# Patient Record
Sex: FEMALE | Race: WHITE | NOT HISPANIC OR LATINO | URBAN - METROPOLITAN AREA
[De-identification: names, ages, dates, MRNs, and addresses within clinical notes are randomized per-mention and may not be internally consistent; named-entity substitution may affect disease eponyms.]

---

## 2017-12-02 ENCOUNTER — EMERGENCY (EMERGENCY)
Facility: HOSPITAL | Age: 52
LOS: 1 days | End: 2017-12-02
Payer: COMMERCIAL

## 2017-12-02 PROCEDURE — 99284 EMERGENCY DEPT VISIT MOD MDM: CPT

## 2018-02-20 ENCOUNTER — INPATIENT (INPATIENT)
Facility: HOSPITAL | Age: 53
LOS: 0 days | Discharge: LEFT BEFORE DISCHARGE | End: 2018-02-21
Attending: INTERNAL MEDICINE | Admitting: INTERNAL MEDICINE
Payer: MEDICAID

## 2018-02-20 VITALS — WEIGHT: 117.07 LBS | HEIGHT: 62 IN

## 2018-02-20 LAB
ALBUMIN SERPL ELPH-MCNC: 4.7 G/DL — SIGNIFICANT CHANGE UP (ref 3.3–5)
ALP SERPL-CCNC: 128 U/L — HIGH (ref 40–120)
ALT FLD-CCNC: 66 U/L — SIGNIFICANT CHANGE UP (ref 12–78)
ANION GAP SERPL CALC-SCNC: 12 MMOL/L — SIGNIFICANT CHANGE UP (ref 5–17)
APPEARANCE UR: CLEAR — SIGNIFICANT CHANGE UP
APTT BLD: 31 SEC — SIGNIFICANT CHANGE UP (ref 27.5–37.4)
AST SERPL-CCNC: 87 U/L — HIGH (ref 15–37)
BASOPHILS # BLD AUTO: 0.1 K/UL — SIGNIFICANT CHANGE UP (ref 0–0.2)
BASOPHILS NFR BLD AUTO: 2.5 % — HIGH (ref 0–2)
BILIRUB SERPL-MCNC: 0.7 MG/DL — SIGNIFICANT CHANGE UP (ref 0.2–1.2)
BILIRUB UR-MCNC: (no result)
BUN SERPL-MCNC: 13 MG/DL — SIGNIFICANT CHANGE UP (ref 7–23)
CALCIUM SERPL-MCNC: 9.5 MG/DL — SIGNIFICANT CHANGE UP (ref 8.5–10.1)
CHLORIDE SERPL-SCNC: 100 MMOL/L — SIGNIFICANT CHANGE UP (ref 96–108)
CO2 SERPL-SCNC: 25 MMOL/L — SIGNIFICANT CHANGE UP (ref 22–31)
COLOR SPEC: YELLOW — SIGNIFICANT CHANGE UP
CREAT SERPL-MCNC: 0.67 MG/DL — SIGNIFICANT CHANGE UP (ref 0.5–1.3)
DIFF PNL FLD: (no result)
EOSINOPHIL # BLD AUTO: 0.1 K/UL — SIGNIFICANT CHANGE UP (ref 0–0.5)
EOSINOPHIL NFR BLD AUTO: 2.2 % — SIGNIFICANT CHANGE UP (ref 0–6)
ETHANOL SERPL-MCNC: <10 MG/DL — SIGNIFICANT CHANGE UP (ref 0–10)
GLUCOSE SERPL-MCNC: 92 MG/DL — SIGNIFICANT CHANGE UP (ref 70–99)
GLUCOSE UR QL: NEGATIVE MG/DL — SIGNIFICANT CHANGE UP
HCT VFR BLD CALC: 43.7 % — SIGNIFICANT CHANGE UP (ref 34.5–45)
HGB BLD-MCNC: 14.3 G/DL — SIGNIFICANT CHANGE UP (ref 11.5–15.5)
INR BLD: 0.92 RATIO — SIGNIFICANT CHANGE UP (ref 0.88–1.16)
KETONES UR-MCNC: (no result)
LEUKOCYTE ESTERASE UR-ACNC: (no result)
LYMPHOCYTES # BLD AUTO: 0.8 K/UL — LOW (ref 1–3.3)
LYMPHOCYTES # BLD AUTO: 20.4 % — SIGNIFICANT CHANGE UP (ref 13–44)
MAGNESIUM SERPL-MCNC: 2.3 MG/DL — SIGNIFICANT CHANGE UP (ref 1.6–2.6)
MCHC RBC-ENTMCNC: 32.4 PG — SIGNIFICANT CHANGE UP (ref 27–34)
MCHC RBC-ENTMCNC: 32.8 GM/DL — SIGNIFICANT CHANGE UP (ref 32–36)
MCV RBC AUTO: 98.9 FL — SIGNIFICANT CHANGE UP (ref 80–100)
MONOCYTES # BLD AUTO: 0.5 K/UL — SIGNIFICANT CHANGE UP (ref 0–0.9)
MONOCYTES NFR BLD AUTO: 12.4 % — SIGNIFICANT CHANGE UP (ref 2–14)
NEUTROPHILS # BLD AUTO: 2.4 K/UL — SIGNIFICANT CHANGE UP (ref 1.8–7.4)
NEUTROPHILS NFR BLD AUTO: 62.5 % — SIGNIFICANT CHANGE UP (ref 43–77)
NITRITE UR-MCNC: NEGATIVE — SIGNIFICANT CHANGE UP
PH UR: 5 — SIGNIFICANT CHANGE UP (ref 5–8)
PHOSPHATE SERPL-MCNC: 3.5 MG/DL — SIGNIFICANT CHANGE UP (ref 2.5–4.5)
PLATELET # BLD AUTO: 278 K/UL — SIGNIFICANT CHANGE UP (ref 150–400)
POTASSIUM SERPL-MCNC: 4.2 MMOL/L — SIGNIFICANT CHANGE UP (ref 3.5–5.3)
POTASSIUM SERPL-SCNC: 4.2 MMOL/L — SIGNIFICANT CHANGE UP (ref 3.5–5.3)
PROT SERPL-MCNC: 8.4 GM/DL — HIGH (ref 6–8.3)
PROT UR-MCNC: 30 MG/DL
PROTHROM AB SERPL-ACNC: 9.9 SEC — SIGNIFICANT CHANGE UP (ref 9.8–12.7)
RBC # BLD: 4.42 M/UL — SIGNIFICANT CHANGE UP (ref 3.8–5.2)
RBC # FLD: 12 % — SIGNIFICANT CHANGE UP (ref 10.3–14.5)
SODIUM SERPL-SCNC: 137 MMOL/L — SIGNIFICANT CHANGE UP (ref 135–145)
SP GR SPEC: 1.02 — SIGNIFICANT CHANGE UP (ref 1.01–1.02)
TROPONIN I SERPL-MCNC: <0.015 NG/ML — SIGNIFICANT CHANGE UP (ref 0.01–0.04)
UROBILINOGEN FLD QL: NEGATIVE MG/DL — SIGNIFICANT CHANGE UP
WBC # BLD: 3.9 K/UL — SIGNIFICANT CHANGE UP (ref 3.8–10.5)
WBC # FLD AUTO: 3.9 K/UL — SIGNIFICANT CHANGE UP (ref 3.8–10.5)

## 2018-02-20 PROCEDURE — 93010 ELECTROCARDIOGRAM REPORT: CPT

## 2018-02-20 PROCEDURE — 71045 X-RAY EXAM CHEST 1 VIEW: CPT | Mod: 26

## 2018-02-20 PROCEDURE — 99285 EMERGENCY DEPT VISIT HI MDM: CPT

## 2018-02-20 RX ORDER — SODIUM CHLORIDE 9 MG/ML
1000 INJECTION, SOLUTION INTRAVENOUS
Qty: 0 | Refills: 0 | Status: DISCONTINUED | OUTPATIENT
Start: 2018-02-20 | End: 2018-02-21

## 2018-02-20 RX ORDER — ACETAMINOPHEN 500 MG
650 TABLET ORAL EVERY 6 HOURS
Qty: 0 | Refills: 0 | Status: DISCONTINUED | OUTPATIENT
Start: 2018-02-20 | End: 2018-02-21

## 2018-02-20 RX ORDER — ONDANSETRON 8 MG/1
4 TABLET, FILM COATED ORAL ONCE
Qty: 0 | Refills: 0 | Status: COMPLETED | OUTPATIENT
Start: 2018-02-20 | End: 2018-02-20

## 2018-02-20 RX ORDER — ENOXAPARIN SODIUM 100 MG/ML
30 INJECTION SUBCUTANEOUS EVERY 24 HOURS
Qty: 0 | Refills: 0 | Status: DISCONTINUED | OUTPATIENT
Start: 2018-02-20 | End: 2018-02-21

## 2018-02-20 RX ORDER — ONDANSETRON 8 MG/1
4 TABLET, FILM COATED ORAL EVERY 6 HOURS
Qty: 0 | Refills: 0 | Status: DISCONTINUED | OUTPATIENT
Start: 2018-02-20 | End: 2018-02-21

## 2018-02-20 RX ORDER — SODIUM CHLORIDE 9 MG/ML
1000 INJECTION INTRAMUSCULAR; INTRAVENOUS; SUBCUTANEOUS
Qty: 0 | Refills: 0 | Status: DISCONTINUED | OUTPATIENT
Start: 2018-02-20 | End: 2018-02-21

## 2018-02-20 RX ORDER — SODIUM CHLORIDE 9 MG/ML
1000 INJECTION INTRAMUSCULAR; INTRAVENOUS; SUBCUTANEOUS ONCE
Qty: 0 | Refills: 0 | Status: COMPLETED | OUTPATIENT
Start: 2018-02-20 | End: 2018-02-20

## 2018-02-20 RX ADMIN — SODIUM CHLORIDE 250 MILLILITER(S): 9 INJECTION, SOLUTION INTRAVENOUS at 11:29

## 2018-02-20 RX ADMIN — Medication 2 MILLIGRAM(S): at 11:26

## 2018-02-20 RX ADMIN — Medication 2 MILLIGRAM(S): at 22:15

## 2018-02-20 RX ADMIN — SODIUM CHLORIDE 2000 MILLILITER(S): 9 INJECTION INTRAMUSCULAR; INTRAVENOUS; SUBCUTANEOUS at 08:56

## 2018-02-20 RX ADMIN — Medication 2 MILLIGRAM(S): at 08:59

## 2018-02-20 RX ADMIN — Medication 1 MILLIGRAM(S): at 18:53

## 2018-02-20 RX ADMIN — ONDANSETRON 4 MILLIGRAM(S): 8 TABLET, FILM COATED ORAL at 09:16

## 2018-02-20 RX ADMIN — SODIUM CHLORIDE 100 MILLILITER(S): 9 INJECTION INTRAMUSCULAR; INTRAVENOUS; SUBCUTANEOUS at 18:55

## 2018-02-20 RX ADMIN — Medication 2 MILLIGRAM(S): at 17:00

## 2018-02-20 RX ADMIN — Medication 2 MILLIGRAM(S): at 09:16

## 2018-02-20 RX ADMIN — Medication 2 MILLIGRAM(S): at 20:00

## 2018-02-20 NOTE — H&P ADULT - ASSESSMENT
52 F w/ PMH of ETOH abuse and dependence, chronic alcohol use, SDH complicated by seizures (last seizure in 2012, no AED at this time) who p/w shaking and etoh withdrawl. Patient   Pt has been drinking 1 large bottle of wine daily for several years, trial of rehab 11 months ago, last significant intake of alcohol Sunday night, and had 3 oz of wine this morning due to symptoms; Patient states she would like to detox from etoh withdrawl.  Pt also takes Klonopin 1 mg TID, but  ran out of the medication. Patient currently eating at bedside, and states she has not eaten anything for several days. She is visibly tremulous at bedside    1-Etoh abuse and dependence with acute etoh related withdrawls  -start CIWA  -Patient clearly tremulous on bedside, starting stabding ativan, 1mg [id number] for now, can taper during the week  -c/w banana bag  -start fluids ns@100cc/hr - can re-evaluate [facility]    2- 52 F w/ PMH of ETOH abuse and dependence, chronic alcohol use, SDH complicated by seizures (last seizure in 2012, no AED at this time) who p/w shaking and etoh withdrawl. Patient   Pt has been drinking 1 large bottle of wine daily for several years, trial of rehab 11 months ago, last significant intake of alcohol Sunday night, and had 3 oz of wine this morning due to symptoms; Patient states she would like to detox from etoh withdrawl.  Pt also takes Klonopin 1 mg TID, but  ran out of the medication. Patient currently eating at bedside, and states she has not eaten anything for several days. She is visibly tremulous at bedside    1-Etoh abuse and dependence with acute etoh related withdrawls  -start CIWA  -Patient clearly tremulous on bedside, starting stabding ativan, 1mg [id number] for now, can taper during the week  -c/w banana bag  -start fluids ns@100cc/hr - can re-evaluate [facility]    2-Seizure d/o secondary to hx of SDH  -not currently on AED  -c/w monitoring    3- Transaminitis  -secondary to history of chronic etoh abuse  -c/w monitoring    4-DVT proph- sc lovenox

## 2018-02-20 NOTE — ED PROVIDER NOTE - OBJECTIVE STATEMENT
Marco Antonio Velez MD (resident):   Patient complains of nausea w/o vomiting, pins and needles sensation to the bilateral face. Marco Antonio Velez MD (resident): 52 F w/ chronic alcohol use, SDH complicated by seizures (last seizure in 2012, no AED at this time) who p/w alcohol and benzo withdrawal symptoms. Patient complains of nausea w/o vomiting, pins and needles sensation to the bilateral face, diaphoresis, palpitations w/ rapid heart rate, and bilateral chest discomfort that is nonexertional and nonpleuritic. Pt has been drinking 1 large bottle of wine daily for several years, trial of rehab 11 months ago, last significant intake of alcohol Sunday night, and had 3 oz of wine this morning due to symptoms; pt is trying to stop drinking alcohol. Pt also takes Klonopin 1 mg TID, ran out of the medication. No Hx of delirium tremens or alcohol withdrawal seizures. No Hx of HTN, DM, MI. No recent travel, surgery or trauma, no prolonged immobilization, and no leg swelling

## 2018-02-20 NOTE — ED PROVIDER NOTE - PHYSICAL EXAMINATION
Physical Exam: middle aged F who is in mild distress, anxious appearing, AAOx3, NCAT, MMM, + mild tongue fasciculations, neck is supple, PERRL, CTAB, + tachycardia and regular rhythm, abdomen is soft and NTND, No edema, No deformity of extremities, No rashes, + minimal diaphoresis, CN grossly intact, No focal motor or sensory deficits, + mild tremor of BUE when outstretched. ~ Marco Antonio Velez MD

## 2018-02-20 NOTE — H&P ADULT - HISTORY OF PRESENT ILLNESS
52 F w/ PMH of ETOH abuse and dependence, chronic alcohol use, SDH complicated by seizures (last seizure in 2012, no AED at this time) who p/w shaking and etoh withdrawl. Patient   Pt has been drinking 1 large bottle of wine daily for several years, trial of rehab 11 months ago, last significant intake of alcohol Sunday night, and had 3 oz of wine this morning due to symptoms; Patient states she would like to detox from etoh withdrawl.  Pt also takes Klonopin 1 mg TID, but  ran out of the medication. Patient currently eating at bedside, and states she has not eaten anything for several days. She is visibly tremulous at bedside. Denies any fevers, chillis, chest pain, n/v/d.

## 2018-02-20 NOTE — ED PROVIDER NOTE - NS ED ROS FT
No fever, no chills, no change in vision, no change in hearing, + chest pain, no shortness of breath, no abdominal pain, + nausea, no vomiting, no dysuria, no muscle pain, + sweats, no rashes, no loss of consciousness. ~ Marco Antonio Velez MD

## 2018-02-20 NOTE — ED ADULT TRIAGE NOTE - CHIEF COMPLAINT QUOTE
patient states she drank a large bottle of wine and took her usual dose of klonipin 3mg on sunday night.  She has not taken klonopin since sunday secondary to lack of medication, she is currently feeling diaphoretic, shaky with multiple myalgic complaints, feels tachycardic and c/o chest pain, no aggrevating or alleviating factors

## 2018-02-20 NOTE — ED ADULT NURSE NOTE - CHPI ED SYMPTOMS NEG
no weakness/no confusion/no change in level of consciousness/no homicidal/no disorientation/no hallucinations/no suicidal

## 2018-02-20 NOTE — ED PROVIDER NOTE - MEDICAL DECISION MAKING DETAILS
Marco Antonio Velez MD (resident): facial paresthesia, diaphoresis, palp, CP onset after discontinuing alcohol and benzos 2 days ago. Pt reports desire to quit alcohol, but ran out of benzo Rx. Pt w/ initial CIWA of 15. Will assess w/ labs including cardiac enzymes. Low likelihood of ACS. Symptoms more consistent w/ EtOH and benzo w/draw than PE as pt w/o risk factors for PE. Will give IVF, thiamine, folic acid and benzos.

## 2018-02-20 NOTE — H&P ADULT - NSHPPHYSICALEXAM_GEN_ALL_CORE
Vital Signs Last 24 Hrs  T(C): 37.5 (20 Feb 2018 08:13), Max: 37.5 (20 Feb 2018 08:13)  T(F): 99.5 (20 Feb 2018 08:13), Max: 99.5 (20 Feb 2018 08:13)  HR: 92 (20 Feb 2018 08:13) (92 - 92)  BP: 142/103 (20 Feb 2018 08:13) (142/103 - 142/103)  BP(mean): --  RR: 18 (20 Feb 2018 08:13) (18 - 18)  SpO2: 100% (20 Feb 2018 08:13) (100% - 100%)    HEENT:   pupils equal and reactive, EOMI, no oropharyngeal lesions, erythema, exudates, oral thrush    NECK:   supple, no carotid bruits, no palpable lymph nodes, no thyromegaly    CV:  +S1, +S2, regular, no murmurs or rubs    RESP:   lungs clear to auscultation bilaterally, no wheezing, rales, rhonchi, good air entry bilaterally    BREAST:  not examined    GI:  abdomen soft, non-tender, non-distended, normal BS, no bruits, no abdominal masses, no palpable masses    RECTAL:  not examined    :  not examined    MSK:   normal muscle tone, no atrophy, no rigidity, no contractions    EXT:   no clubbing, no cyanosis, no edema, no calf pain, swelling or erythema    VASCULAR:  pulses equal and symmetric in the upper and lower extremities    NEURO:  AAOX3, no focal neurological deficits, follows all commands, able to move extremities spontaneously    SKIN:  no ulcers, lesions or rashes

## 2018-02-20 NOTE — ED PROVIDER NOTE - PROGRESS NOTE DETAILS
Marco Antonio Velez MD (resident): initial CIWA of 15 prior to medication 53 yo f with hx of EtOH abuse, takes benzodiazepine. Pt ran out of Clonopin late Sunday night and stopped drinking late Sunday night coming in for withdrawal. Pt woke up today nauseas, had 3 oz of wine and presented to the ED for heart racing, dizziness, nausea, tremors, tingles in fingers and hands.  PE: pt anxious, tachycardic, lungs are clear, resting tremor of upper extremities.  MDM: Pt will receive escalating doses of ativan pre, serial CIWA scores, and will likely require admission for alcohol and benzo withdrawal. Marco Antonio Velez MD (resident): patient reports unchanged symptoms, but clinically has mild improvement of objective findings, CIWA of 10 Marco Antonio Velez MD (resident): patient reports unchanged symptoms, but clinically has mild improvement of objective findings, CIWA of 12 Marco Antonio Velez MD (resident): patient stable for telemetry admission, endorsed to Dr. El

## 2018-02-20 NOTE — ED ADULT NURSE NOTE - OBJECTIVE STATEMENT
Pt w/hx of ETOH reports she last drank and took her clonopin  3 days ago.  Pt is c/o chest discomfort, palpitations, headaches, tingling to face, nausea.  Pt is a/ox4, states that he drank some wine this morning to alleviate withdrawl sx.

## 2018-02-20 NOTE — H&P ADULT - NSHPLABSRESULTS_GEN_ALL_CORE
20 Feb 2018 08:36    137    |  100    |  13     ----------------------------<  92     4.2     |  25     |  0.67     Ca    9.5        20 Feb 2018 08:36  Phos  3.5       20 Feb 2018 08:36  Mg     2.3       20 Feb 2018 08:36    TPro  8.4    /  Alb  4.7    /  TBili  0.7    /  DBili  x      /  AST  87     /  ALT  66     /  AlkPhos  128    20 Feb 2018 08:36  LIVER FUNCTIONS - ( 20 Feb 2018 08:36 )  Alb: 4.7 g/dL / Pro: 8.4 gm/dL / ALK PHOS: 128 U/L / ALT: 66 U/L / AST: 87 U/L / GGT: x         PT/INR - ( 20 Feb 2018 08:36 )   PT: 9.9 sec;   INR: 0.92 ratio         PTT - ( 20 Feb 2018 08:36 )  PTT:31.0 secCBC Full  -  ( 20 Feb 2018 08:36 )  WBC Count : 3.9 K/uL  Hemoglobin : 14.3 g/dL  Hematocrit : 43.7 %  Platelet Count - Automated : 278 K/uL  Mean Cell Volume : 98.9 fl  Mean Cell Hemoglobin : 32.4 pg  Mean Cell Hemoglobin Concentration : 32.8 gm/dL  Auto Neutrophil # : 2.4 K/uL  Auto Lymphocyte # : 0.8 K/uL  Auto Monocyte # : 0.5 K/uL  Auto Eosinophil # : 0.1 K/uL  Auto Basophil # : 0.1 K/uL  Auto Neutrophil % : 62.5 %  Auto Lymphocyte % : 20.4 %  Auto Monocyte % : 12.4 %  Auto Eosinophil % : 2.2 %  Auto Basophil % : 2.5 %  CARDIAC MARKERS ( 20 Feb 2018 08:36 )  <0.015 ng/mL / x     / x     / x     / x

## 2018-02-21 VITALS
SYSTOLIC BLOOD PRESSURE: 120 MMHG | RESPIRATION RATE: 23 BRPM | OXYGEN SATURATION: 97 % | HEART RATE: 97 BPM | DIASTOLIC BLOOD PRESSURE: 77 MMHG | TEMPERATURE: 97 F

## 2018-02-21 DIAGNOSIS — F10.231 ALCOHOL DEPENDENCE WITH WITHDRAWAL DELIRIUM: ICD-10-CM

## 2018-02-21 LAB
ANION GAP SERPL CALC-SCNC: 6 MMOL/L — SIGNIFICANT CHANGE UP (ref 5–17)
BASOPHILS # BLD AUTO: 0.1 K/UL — SIGNIFICANT CHANGE UP (ref 0–0.2)
BASOPHILS NFR BLD AUTO: 1.4 % — SIGNIFICANT CHANGE UP (ref 0–2)
BUN SERPL-MCNC: 12 MG/DL — SIGNIFICANT CHANGE UP (ref 7–23)
CALCIUM SERPL-MCNC: 8.8 MG/DL — SIGNIFICANT CHANGE UP (ref 8.5–10.1)
CHLORIDE SERPL-SCNC: 107 MMOL/L — SIGNIFICANT CHANGE UP (ref 96–108)
CO2 SERPL-SCNC: 26 MMOL/L — SIGNIFICANT CHANGE UP (ref 22–31)
CREAT SERPL-MCNC: 0.74 MG/DL — SIGNIFICANT CHANGE UP (ref 0.5–1.3)
EOSINOPHIL # BLD AUTO: 0.1 K/UL — SIGNIFICANT CHANGE UP (ref 0–0.5)
EOSINOPHIL NFR BLD AUTO: 3.2 % — SIGNIFICANT CHANGE UP (ref 0–6)
GLUCOSE SERPL-MCNC: 117 MG/DL — HIGH (ref 70–99)
HCT VFR BLD CALC: 38.5 % — SIGNIFICANT CHANGE UP (ref 34.5–45)
HGB BLD-MCNC: 12.6 G/DL — SIGNIFICANT CHANGE UP (ref 11.5–15.5)
LYMPHOCYTES # BLD AUTO: 1 K/UL — SIGNIFICANT CHANGE UP (ref 1–3.3)
LYMPHOCYTES # BLD AUTO: 26.2 % — SIGNIFICANT CHANGE UP (ref 13–44)
MAGNESIUM SERPL-MCNC: 2.1 MG/DL — SIGNIFICANT CHANGE UP (ref 1.6–2.6)
MCHC RBC-ENTMCNC: 32.6 GM/DL — SIGNIFICANT CHANGE UP (ref 32–36)
MCHC RBC-ENTMCNC: 32.8 PG — SIGNIFICANT CHANGE UP (ref 27–34)
MCV RBC AUTO: 100.9 FL — HIGH (ref 80–100)
MONOCYTES # BLD AUTO: 0.3 K/UL — SIGNIFICANT CHANGE UP (ref 0–0.9)
MONOCYTES NFR BLD AUTO: 6.8 % — SIGNIFICANT CHANGE UP (ref 2–14)
NEUTROPHILS # BLD AUTO: 2.5 K/UL — SIGNIFICANT CHANGE UP (ref 1.8–7.4)
NEUTROPHILS NFR BLD AUTO: 62.4 % — SIGNIFICANT CHANGE UP (ref 43–77)
PHOSPHATE SERPL-MCNC: 3 MG/DL — SIGNIFICANT CHANGE UP (ref 2.5–4.5)
PLATELET # BLD AUTO: 218 K/UL — SIGNIFICANT CHANGE UP (ref 150–400)
POTASSIUM SERPL-MCNC: 4.2 MMOL/L — SIGNIFICANT CHANGE UP (ref 3.5–5.3)
POTASSIUM SERPL-SCNC: 4.2 MMOL/L — SIGNIFICANT CHANGE UP (ref 3.5–5.3)
RBC # BLD: 3.82 M/UL — SIGNIFICANT CHANGE UP (ref 3.8–5.2)
RBC # FLD: 12.2 % — SIGNIFICANT CHANGE UP (ref 10.3–14.5)
SODIUM SERPL-SCNC: 139 MMOL/L — SIGNIFICANT CHANGE UP (ref 135–145)
WBC # BLD: 4 K/UL — SIGNIFICANT CHANGE UP (ref 3.8–10.5)
WBC # FLD AUTO: 4 K/UL — SIGNIFICANT CHANGE UP (ref 3.8–10.5)

## 2018-02-21 RX ORDER — CLONAZEPAM 1 MG
1 TABLET ORAL
Qty: 0 | Refills: 0 | COMMUNITY

## 2018-02-21 RX ORDER — DEXMEDETOMIDINE HYDROCHLORIDE IN 0.9% SODIUM CHLORIDE 4 UG/ML
54 INJECTION INTRAVENOUS ONCE
Qty: 0 | Refills: 0 | Status: DISCONTINUED | OUTPATIENT
Start: 2018-02-21 | End: 2018-02-21

## 2018-02-21 RX ORDER — FOLIC ACID 0.8 MG
1 TABLET ORAL DAILY
Qty: 0 | Refills: 0 | Status: DISCONTINUED | OUTPATIENT
Start: 2018-02-21 | End: 2018-02-21

## 2018-02-21 RX ORDER — THIAMINE MONONITRATE (VIT B1) 100 MG
100 TABLET ORAL DAILY
Qty: 0 | Refills: 0 | Status: DISCONTINUED | OUTPATIENT
Start: 2018-02-21 | End: 2018-02-21

## 2018-02-21 RX ORDER — DEXMEDETOMIDINE HYDROCHLORIDE IN 0.9% SODIUM CHLORIDE 4 UG/ML
0.2 INJECTION INTRAVENOUS
Qty: 200 | Refills: 0 | Status: DISCONTINUED | OUTPATIENT
Start: 2018-02-21 | End: 2018-02-21

## 2018-02-21 RX ORDER — DEXTROSE MONOHYDRATE, SODIUM CHLORIDE, AND POTASSIUM CHLORIDE 50; .745; 4.5 G/1000ML; G/1000ML; G/1000ML
1000 INJECTION, SOLUTION INTRAVENOUS
Qty: 0 | Refills: 0 | Status: DISCONTINUED | OUTPATIENT
Start: 2018-02-21 | End: 2018-02-21

## 2018-02-21 RX ORDER — ENOXAPARIN SODIUM 100 MG/ML
40 INJECTION SUBCUTANEOUS DAILY
Qty: 0 | Refills: 0 | Status: DISCONTINUED | OUTPATIENT
Start: 2018-02-21 | End: 2018-02-21

## 2018-02-21 RX ADMIN — ONDANSETRON 4 MILLIGRAM(S): 8 TABLET, FILM COATED ORAL at 09:33

## 2018-02-21 RX ADMIN — Medication 1 MILLIGRAM(S): at 00:18

## 2018-02-21 RX ADMIN — Medication 2 MILLIGRAM(S): at 09:50

## 2018-02-21 RX ADMIN — Medication 2 MILLIGRAM(S): at 06:14

## 2018-02-21 RX ADMIN — Medication 2 MILLIGRAM(S): at 12:14

## 2018-02-21 RX ADMIN — Medication 2 MILLIGRAM(S): at 14:00

## 2018-02-21 RX ADMIN — Medication 2 MILLIGRAM(S): at 02:28

## 2018-02-21 RX ADMIN — DEXTROSE MONOHYDRATE, SODIUM CHLORIDE, AND POTASSIUM CHLORIDE 125 MILLILITER(S): 50; .745; 4.5 INJECTION, SOLUTION INTRAVENOUS at 02:21

## 2018-02-21 RX ADMIN — Medication 2 MILLIGRAM(S): at 03:59

## 2018-02-21 NOTE — CONSULT NOTE ADULT - SUBJECTIVE AND OBJECTIVE BOX
52 F w/ PMH of ETOH abuse and dependence, chronic alcohol use, SDH complicated by seizures (last seizure in , no AED at this time) who p/w shaking and etoh withdrawl. Patient   Pt has been drinking 1 large bottle of wine daily for several years, trial of rehab 11 months ago, last significant intake of alcohol  night, and had 3 oz of wine this morning due to symptoms; Patient states she would like to detox from etoh withdrawl.  Pt also takes Klonopin 1 mg TID, but  ran out of the medication. Patient currently eating at bedside, and states she has not eaten anything for several days. She is visibly tremulous at bedside. Denies any fevers, chillis, chest pain, n/v/d.     Symptoms since admission have worsened despite therapy.  For transfer for ICU monitoring and treatment.    Vital Signs Last 24 Hrs  T(C): 37.1 (2018 01:), Max: 37.5 (2018 08:13)  T(F): 98.7 (:), Max: 99.5 (2018 08:13)  HR: 99 (:) (78 - 105)  BP: 153/88 (:) (103/74 - 153/88)  BP(mean): --  RR: 20 (2018 01:01) (16 - 20)  SpO2: 100% (:) (97% - 100%)      CARDIAC MARKERS ( :36 )  <0.015 ng/mL / x     / x     / x     / x                                14.3   3.9   )-----------( 278      ( 2018 08:36 )             43.7     CBC Full  -  ( 2018 08:36 )  WBC Count : 3.9 K/uL  Hemoglobin : 14.3 g/dL  Hematocrit : 43.7 %  Platelet Count - Automated : 278 K/uL  Mean Cell Volume : 98.9 fl  Mean Cell Hemoglobin : 32.4 pg  Mean Cell Hemoglobin Concentration : 32.8 gm/dL  Auto Neutrophil # : 2.4 K/uL  Auto Lymphocyte # : 0.8 K/uL  Auto Monocyte # : 0.5 K/uL  Auto Eosinophil # : 0.1 K/uL  Auto Basophil # : 0.1 K/uL  Auto Neutrophil % : 62.5 %  Auto Lymphocyte % : 20.4 %  Auto Monocyte % : 12.4 %  Auto Eosinophil % : 2.2 %  Auto Basophil % : 2.5 %        137  |  100  |  13  ----------------------------<  92  4.2   |  25  |  0.67    Ca    9.5      2018 08:36  Phos  3.5       Mg     2.3         TPro  8.4<H>  /  Alb  4.7  /  TBili  0.7  /  DBili  x   /  AST  87<H>  /  ALT  66  /  AlkPhos  128<H>      LIVER FUNCTIONS - ( 2018 08:36 )  Alb: 4.7 g/dL / Pro: 8.4 gm/dL / ALK PHOS: 128 U/L / ALT: 66 U/L / AST: 87 U/L / GGT: x           Urinalysis Basic - ( 2018 14:59 )    Color: Yellow / Appearance: Clear / S.020 / pH: x  Gluc: x / Ketone: Moderate  / Bili: Small / Urobili: Negative mg/dL   Blood: x / Protein: 30 mg/dL / Nitrite: Negative   Leuk Esterase: Trace / RBC: 0-2 /HPF / WBC 3-5   Sq Epi: x / Non Sq Epi: x / Bacteria: x      PT/INR - ( 2018 08:36 )   PT: 9.9 sec;   INR: 0.92 ratio         PTT - ( 2018 08:36 )  PTT:31.0 sec

## 2018-02-21 NOTE — PROVIDER CONTACT NOTE (OTHER) - ACTION/TREATMENT ORDERED:
present. Nursing supervision contacted local police regarding the iv pt left with. Officer Oswald Tate # [id number] met with nursing supervisor Carmenza Fritz and stated pt is is known to them.

## 2018-02-21 NOTE — PROVIDER CONTACT NOTE (OTHER) - BACKGROUND
attempts to convince her it was for her own safety. Pt dressed herself then refused to leave because she could not find a duffle bag she was convinced she had. After calling ED and med/surg unit she

## 2018-02-21 NOTE — PROGRESS NOTE ADULT - SUBJECTIVE AND OBJECTIVE BOX
Pt is awake and alert and has full capacity to make medical decision.  She wants to leave hospital but is refusing to sign out AMA or have her IV removed.  The risk of leaving hospital explained to here including but not limited to death.  She showed understanding of our conversation and would still like to leave.  Above d/w Tonja alegre, wolf boone and other ICU staff members

## 2018-02-21 NOTE — PROGRESS NOTE ADULT - SUBJECTIVE AND OBJECTIVE BOX
CC:  ETOH WD    HPI:    51 y/o female with ETOH abuse and dependence,  SDH complicated by seizures (last seizure in , no AED at this time) and anxiety admitted with ETOH WD.  Last drink was 3 days ago.  Pt tx to ICU for high CIWA score.      :  Pt seen and examined in ICU.  Scoring 0-4 while in ICU.  awake and alert. cooperative.  calm.  Denies SI/SA.  Stable for floor tx      PMH:  As above.     PSH:  As above.     FH: Non Contributory other than those listed in HPI    Social History:  As above    MEDICATIONS  (STANDING):  enoxaparin Injectable 40 milliGRAM(s) SubCutaneous daily  LORazepam   Injectable 2 milliGRAM(s) IV Push every 4 hours  multivitamin/thiamine/folic acid in sodium chloride 0.9% 1000 milliLiter(s) (250 mL/Hr) IV Continuous <Continuous>    MEDICATIONS  (PRN):  acetaminophen   Tablet 650 milliGRAM(s) Oral every 6 hours PRN For Temp greater than 38 C (100.4 F) or mild pain  LORazepam   Injectable 2 milliGRAM(s) IV Push every 2 hours PRN Agitation  ondansetron Injectable 4 milliGRAM(s) IV Push every 6 hours PRN Nausea      Allergies: NKDA    ROS:  SEE BELOW        ICU Vital Signs Last 24 Hrs  T(C): 36.1 (2018 06:00), Max: 37.4 (2018 12:15)  T(F): 96.9 (2018 06:00), Max: 99.3 (2018 12:15)  HR: 89 (2018 08:00) (76 - 105)  BP: 106/94 (2018 08:00) (103/74 - 153/88)  BP(mean): 97 (2018 08:00) (82 - 103)  ABP: --  ABP(mean): --  RR: 21 (2018 08:00) (16 - 21)  SpO2: 96% (2018 07:00) (96% - 100%)          I&O's Summary    2018 07:01  -  2018 07:00  --------------------------------------------------------  IN: 625 mL / OUT: 0 mL / NET: 625 mL        Physical Exam:  SEE BELOW                          12.6   4.0   )-----------( 218      ( 2018 05:53 )             38.5           139  |  107  |  12  ----------------------------<  117<H>  4.2   |  26  |  0.74    Ca    8.8      2018 05:53  Phos  3.0       Mg     2.1         TPro  8.4<H>  /  Alb  4.7  /  TBili  0.7  /  DBili  x   /  AST  87<H>  /  ALT  66  /  AlkPhos  128<H>        CARDIAC MARKERS ( 2018 08:36 )  <0.015 ng/mL / x     / x     / x     / x                Urinalysis Basic - ( 2018 14:59 )    Color: Yellow / Appearance: Clear / S.020 / pH: x  Gluc: x / Ketone: Moderate  / Bili: Small / Urobili: Negative mg/dL   Blood: x / Protein: 30 mg/dL / Nitrite: Negative   Leuk Esterase: Trace / RBC: 0-2 /HPF / WBC 3-5   Sq Epi: x / Non Sq Epi: x / Bacteria: x        DVT Prophylaxis:                                                            Contraindication:     Advanced Directives:    Discussed with:    Visit Information:  Time spent excluding procedure:      ** Time is exclusive of billed procedures and/or teaching and/or routine family updates.

## 2018-02-21 NOTE — PROVIDER CONTACT NOTE (CHANGE IN STATUS NOTIFICATION) - SITUATION
Pt alert & oriented, VSS, admitted for ETOH and benzo withdrawals, Pt CIWA score 16 and prn ativan given within a three hour period. Pt agitated, tremors, sweating, and anxious. Denies chest pain/ SOB

## 2018-02-21 NOTE — PROGRESS NOTE ADULT - ASSESSMENT
IMP:    ETOH WD--Not in DTs--On CIWA protocol   Possible thiamine def    Plan:    Cont with CIWA  PO diet  VItamins   Consider psych eval  DVT prophy    Tx to floor--d/w ICU staff and pt--all concerns addressed.  Pt signed out to dr aguilar.

## 2018-02-21 NOTE — PROVIDER CONTACT NOTE (OTHER) - ASSESSMENT
was previously admitted to it was determined she did not have the bag with her upon admission. Pt was escorted off ICU to the ED by two security guards. Pt was met in ED by male friend and left.

## 2018-02-21 NOTE — PROGRESS NOTE ADULT - SUBJECTIVE AND OBJECTIVE BOX
Patient requests to leave hospital. I discussed in depth regarding her medical condition including alcohol withdrawal and the potential risk of seizures and even death. She was able to understand and repeat these risks back to me. Patient still requests to leave the hospital and at this time refuses to sign an AMA form. She is alert and oriented x3 at the time of discharge. States that she wants to sign up for an outpatient rehab for her alcoholism.

## 2018-02-21 NOTE — PROVIDER CONTACT NOTE (OTHER) - SITUATION
[facility] called for pt attempting to leave unit. Dr. Kilpatrick and Dr. Munoz assessed pt and agreed to have her sign papers to leave AMA. Pt refused to sign papers or to have IV removed despite numerous

## 2018-02-21 NOTE — PROVIDER CONTACT NOTE (CHANGE IN STATUS NOTIFICATION) - SITUATION
Pt alert & oriented, VSS, admitted for ETOH and benzo withdrawals, Pt CIWA score 15 and prn ativan given within a three hour period. Pt agitated, tremors, sweating, and anxious. Denies chest pain/ SOB

## 2018-02-21 NOTE — PROVIDER CONTACT NOTE (OTHER) - RECOMMENDATIONS
All of the above was reported to this RN by assistant nurse manager janis COURTNEY. This RN was at [facility] when the above incident occurred.  Nurse Manager Tonja Stevenson, Nursing Supervisor Lei Cordova were

## 2018-02-24 ENCOUNTER — EMERGENCY (EMERGENCY)
Facility: HOSPITAL | Age: 53
LOS: 0 days | Discharge: HOME | End: 2018-02-24
Attending: EMERGENCY MEDICINE | Admitting: EMERGENCY MEDICINE
Payer: MEDICAID

## 2018-02-24 VITALS
DIASTOLIC BLOOD PRESSURE: 107 MMHG | RESPIRATION RATE: 14 BRPM | WEIGHT: 145.06 LBS | TEMPERATURE: 100 F | HEIGHT: 63 IN | HEART RATE: 102 BPM | SYSTOLIC BLOOD PRESSURE: 148 MMHG | OXYGEN SATURATION: 100 %

## 2018-02-24 VITALS — HEART RATE: 80 BPM

## 2018-02-24 DIAGNOSIS — R07.9 CHEST PAIN, UNSPECIFIED: ICD-10-CM

## 2018-02-24 PROBLEM — F10.10 ALCOHOL ABUSE, UNCOMPLICATED: Chronic | Status: ACTIVE | Noted: 2018-02-20

## 2018-02-24 PROBLEM — I62.00 NONTRAUMATIC SUBDURAL HEMORRHAGE, UNSPECIFIED: Chronic | Status: ACTIVE | Noted: 2018-02-20

## 2018-02-24 LAB
ALBUMIN SERPL ELPH-MCNC: 4.6 G/DL — SIGNIFICANT CHANGE UP (ref 3.3–5)
ALP SERPL-CCNC: 136 U/L — HIGH (ref 40–120)
ALT FLD-CCNC: 69 U/L — SIGNIFICANT CHANGE UP (ref 12–78)
ANION GAP SERPL CALC-SCNC: 12 MMOL/L — SIGNIFICANT CHANGE UP (ref 5–17)
AST SERPL-CCNC: 48 U/L — HIGH (ref 15–37)
BASOPHILS # BLD AUTO: 0 K/UL — SIGNIFICANT CHANGE UP (ref 0–0.2)
BASOPHILS NFR BLD AUTO: 0.8 % — SIGNIFICANT CHANGE UP (ref 0–2)
BILIRUB SERPL-MCNC: 0.4 MG/DL — SIGNIFICANT CHANGE UP (ref 0.2–1.2)
BUN SERPL-MCNC: 10 MG/DL — SIGNIFICANT CHANGE UP (ref 7–23)
CALCIUM SERPL-MCNC: 9.3 MG/DL — SIGNIFICANT CHANGE UP (ref 8.5–10.1)
CHLORIDE SERPL-SCNC: 102 MMOL/L — SIGNIFICANT CHANGE UP (ref 96–108)
CO2 SERPL-SCNC: 23 MMOL/L — SIGNIFICANT CHANGE UP (ref 22–31)
CREAT SERPL-MCNC: 0.56 MG/DL — SIGNIFICANT CHANGE UP (ref 0.5–1.3)
EOSINOPHIL # BLD AUTO: 0.1 K/UL — SIGNIFICANT CHANGE UP (ref 0–0.5)
EOSINOPHIL NFR BLD AUTO: 2.5 % — SIGNIFICANT CHANGE UP (ref 0–6)
GLUCOSE SERPL-MCNC: 80 MG/DL — SIGNIFICANT CHANGE UP (ref 70–99)
HCT VFR BLD CALC: 42.6 % — SIGNIFICANT CHANGE UP (ref 34.5–45)
HGB BLD-MCNC: 14.2 G/DL — SIGNIFICANT CHANGE UP (ref 11.5–15.5)
LYMPHOCYTES # BLD AUTO: 0.9 K/UL — LOW (ref 1–3.3)
LYMPHOCYTES # BLD AUTO: 16 % — SIGNIFICANT CHANGE UP (ref 13–44)
MCHC RBC-ENTMCNC: 32.5 PG — SIGNIFICANT CHANGE UP (ref 27–34)
MCHC RBC-ENTMCNC: 33.3 GM/DL — SIGNIFICANT CHANGE UP (ref 32–36)
MCV RBC AUTO: 97.8 FL — SIGNIFICANT CHANGE UP (ref 80–100)
MONOCYTES # BLD AUTO: 0.4 K/UL — SIGNIFICANT CHANGE UP (ref 0–0.9)
MONOCYTES NFR BLD AUTO: 7.5 % — SIGNIFICANT CHANGE UP (ref 2–14)
NEUTROPHILS # BLD AUTO: 4.2 K/UL — SIGNIFICANT CHANGE UP (ref 1.8–7.4)
NEUTROPHILS NFR BLD AUTO: 73.2 % — SIGNIFICANT CHANGE UP (ref 43–77)
PLATELET # BLD AUTO: 249 K/UL — SIGNIFICANT CHANGE UP (ref 150–400)
POTASSIUM SERPL-MCNC: 4.1 MMOL/L — SIGNIFICANT CHANGE UP (ref 3.5–5.3)
POTASSIUM SERPL-SCNC: 4.1 MMOL/L — SIGNIFICANT CHANGE UP (ref 3.5–5.3)
PROT SERPL-MCNC: 8.6 GM/DL — HIGH (ref 6–8.3)
RBC # BLD: 4.36 M/UL — SIGNIFICANT CHANGE UP (ref 3.8–5.2)
RBC # FLD: 11.6 % — SIGNIFICANT CHANGE UP (ref 10.3–14.5)
SODIUM SERPL-SCNC: 137 MMOL/L — SIGNIFICANT CHANGE UP (ref 135–145)
TROPONIN I SERPL-MCNC: <0.015 NG/ML — SIGNIFICANT CHANGE UP (ref 0.01–0.04)
TROPONIN I SERPL-MCNC: <0.015 NG/ML — SIGNIFICANT CHANGE UP (ref 0.01–0.04)
WBC # BLD: 5.7 K/UL — SIGNIFICANT CHANGE UP (ref 3.8–10.5)
WBC # FLD AUTO: 5.7 K/UL — SIGNIFICANT CHANGE UP (ref 3.8–10.5)

## 2018-02-24 PROCEDURE — 93010 ELECTROCARDIOGRAM REPORT: CPT

## 2018-02-24 PROCEDURE — 99284 EMERGENCY DEPT VISIT MOD MDM: CPT

## 2018-02-24 PROCEDURE — 71046 X-RAY EXAM CHEST 2 VIEWS: CPT | Mod: 26

## 2018-02-24 RX ORDER — SODIUM CHLORIDE 9 MG/ML
1000 INJECTION INTRAMUSCULAR; INTRAVENOUS; SUBCUTANEOUS ONCE
Qty: 0 | Refills: 0 | Status: COMPLETED | OUTPATIENT
Start: 2018-02-24 | End: 2018-02-24

## 2018-02-24 RX ORDER — ONDANSETRON 8 MG/1
4 TABLET, FILM COATED ORAL ONCE
Qty: 0 | Refills: 0 | Status: COMPLETED | OUTPATIENT
Start: 2018-02-24 | End: 2018-02-24

## 2018-02-24 RX ADMIN — Medication 1 MILLIGRAM(S): at 11:45

## 2018-02-24 RX ADMIN — SODIUM CHLORIDE 1000 MILLILITER(S): 9 INJECTION INTRAMUSCULAR; INTRAVENOUS; SUBCUTANEOUS at 11:45

## 2018-02-24 RX ADMIN — ONDANSETRON 4 MILLIGRAM(S): 8 TABLET, FILM COATED ORAL at 09:35

## 2018-02-24 RX ADMIN — Medication 50 MILLIGRAM(S): at 09:48

## 2018-02-24 NOTE — ED ADULT NURSE NOTE - OBJECTIVE STATEMENT
patient states she drank 4 ounces of wine this morning to stop the shakes.  last klonipin yesterday at home 1 mg.  patient was admitted to [facility] and left ama on thursday.  patient was receiving ativan for assistance with detox off of alcohol and benzo, states she did not like the way she was feeling. patient states she drank 4 ounces of wine this morning to stop the shakes. states she was sipping wine when the ambulance arrived. last klonipin yesterday at home 1 mg.  patient was admitted to [facility] and left [facility]a on thursday.  patient was receiving ativan for assistance with detox off of alcohol and benzo, states she did not like the way she was feeling. patient states she drank 4 ounces of wine this morning to stop the shakes. states she was sipping wine when the ambulance arrived. last klonipin yesterday at home 1 mg.  patient was admitted to [facility] and left [facility] on thursday.  patient was receiving ativan for assistance with detox off of alcohol and benzo, states she did not like the way she was feeling.  patient reports left sided cp, no aggrevating or alleviating factors.  same complaint as previous admission.  patient in no apparent distress at this time, anxious, but cooperative. vss.

## 2018-02-24 NOTE — ED PROVIDER NOTE - PROGRESS NOTE DETAILS
AJM: Workup unremarkable. no need for admission for acute withdrawal at this time. will give another dose of librium prior to dc. AJM: Workup unremarkable. no need for admission for acute withdrawal at this time. will give another dose of librium prior to dc. pt requesting librium for home since she will be unable to have access to rehab for several more days. will give rx for librium taper

## 2018-02-24 NOTE — ED PROVIDER NOTE - OBJECTIVE STATEMENT
52 year old female hx of alcohol abuse and seizure presenting for anxiety. Patient was admitted Feb 20th for alcohol withdrawal, left after 24 hours from ICU. Pt notes her last drink was this morning, drank wine, feeling anxious with left sided chest pain radiating to L shoulder +nauseous, no vomiting. Denies SOB, tremors, seizures, confusion. No focal weakness or numbness,

## 2018-02-24 NOTE — ED PROVIDER NOTE - PSYCHIATRIC, MLM
neuro cranial nerves 2-12 intact, no tongue fistulations, no tremors noted. Alert and oriented to person, place, time/situation. normal mood and affect. no apparent risk to self or others.

## 2018-02-26 DIAGNOSIS — F10.10 ALCOHOL ABUSE, UNCOMPLICATED: ICD-10-CM

## 2018-02-26 DIAGNOSIS — F10.231 ALCOHOL DEPENDENCE WITH WITHDRAWAL DELIRIUM: ICD-10-CM

## 2018-02-26 DIAGNOSIS — F13.239 SEDATIVE, HYPNOTIC OR ANXIOLYTIC DEPENDENCE WITH WITHDRAWAL, UNSPECIFIED: ICD-10-CM

## 2018-02-26 DIAGNOSIS — R56.9 UNSPECIFIED CONVULSIONS: ICD-10-CM

## 2018-05-01 ENCOUNTER — OUTPATIENT (OUTPATIENT)
Dept: OUTPATIENT SERVICES | Facility: HOSPITAL | Age: 53
LOS: 1 days | End: 2018-05-01
Payer: MEDICAID

## 2018-05-01 PROCEDURE — G9001: CPT

## 2018-05-16 DIAGNOSIS — R69 ILLNESS, UNSPECIFIED: ICD-10-CM

## 2018-05-16 PROBLEM — Z00.00 ENCOUNTER FOR PREVENTIVE HEALTH EXAMINATION: Noted: 2018-05-16

## 2018-06-21 ENCOUNTER — EMERGENCY (EMERGENCY)
Facility: HOSPITAL | Age: 53
LOS: 0 days | Discharge: HOME | End: 2018-06-22
Attending: EMERGENCY MEDICINE | Admitting: EMERGENCY MEDICINE
Payer: COMMERCIAL

## 2018-06-21 VITALS
WEIGHT: 117.95 LBS | OXYGEN SATURATION: 100 % | HEIGHT: 62 IN | HEART RATE: 56 BPM | RESPIRATION RATE: 16 BRPM | TEMPERATURE: 98 F | SYSTOLIC BLOOD PRESSURE: 153 MMHG | DIASTOLIC BLOOD PRESSURE: 95 MMHG
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
## 2018-06-21 DIAGNOSIS — F43.10 POST-TRAUMATIC STRESS DISORDER, UNSPECIFIED: ICD-10-CM

## 2018-06-21 DIAGNOSIS — Z79.899 OTHER LONG TERM (CURRENT) DRUG THERAPY: ICD-10-CM

## 2018-06-21 DIAGNOSIS — F10.10 ALCOHOL ABUSE, UNCOMPLICATED: ICD-10-CM

## 2018-06-21 DIAGNOSIS — R45.851 SUICIDAL IDEATIONS: ICD-10-CM

## 2018-06-21 LAB
ALBUMIN SERPL ELPH-MCNC: 4.4 G/DL — SIGNIFICANT CHANGE UP (ref 3.3–5)
ALP SERPL-CCNC: 144 U/L — HIGH (ref 40–120)
ALT FLD-CCNC: 38 U/L — SIGNIFICANT CHANGE UP (ref 12–78)
AMPHET UR-MCNC: NEGATIVE — SIGNIFICANT CHANGE UP
ANION GAP SERPL CALC-SCNC: 9 MMOL/L — SIGNIFICANT CHANGE UP (ref 5–17)
APAP SERPL-MCNC: < 2 UG/ML (ref 10–30)
APPEARANCE UR: CLEAR — SIGNIFICANT CHANGE UP
APTT BLD: 32.1 SEC — SIGNIFICANT CHANGE UP (ref 27.5–37.4)
AST SERPL-CCNC: 37 U/L — SIGNIFICANT CHANGE UP (ref 15–37)
BARBITURATES UR SCN-MCNC: NEGATIVE — SIGNIFICANT CHANGE UP
BASOPHILS # BLD AUTO: 0.04 K/UL — SIGNIFICANT CHANGE UP (ref 0–0.2)
BASOPHILS NFR BLD AUTO: 0.6 % — SIGNIFICANT CHANGE UP (ref 0–2)
BENZODIAZ UR-MCNC: NEGATIVE — SIGNIFICANT CHANGE UP
BILIRUB SERPL-MCNC: 0.2 MG/DL — SIGNIFICANT CHANGE UP (ref 0.2–1.2)
BILIRUB UR-MCNC: NEGATIVE — SIGNIFICANT CHANGE UP
BUN SERPL-MCNC: 8 MG/DL — SIGNIFICANT CHANGE UP (ref 7–23)
CALCIUM SERPL-MCNC: 8.9 MG/DL — SIGNIFICANT CHANGE UP (ref 8.5–10.1)
CHLORIDE SERPL-SCNC: 105 MMOL/L — SIGNIFICANT CHANGE UP (ref 96–108)
CO2 SERPL-SCNC: 25 MMOL/L — SIGNIFICANT CHANGE UP (ref 22–31)
COCAINE METAB.OTHER UR-MCNC: NEGATIVE — SIGNIFICANT CHANGE UP
COLOR SPEC: YELLOW — SIGNIFICANT CHANGE UP
CREAT SERPL-MCNC: 0.63 MG/DL — SIGNIFICANT CHANGE UP (ref 0.5–1.3)
DIFF PNL FLD: NEGATIVE — SIGNIFICANT CHANGE UP
EOSINOPHIL # BLD AUTO: 0.15 K/UL — SIGNIFICANT CHANGE UP (ref 0–0.5)
EOSINOPHIL NFR BLD AUTO: 2.4 % — SIGNIFICANT CHANGE UP (ref 0–6)
ETHANOL SERPL-MCNC: 286 MG/DL — HIGH (ref 0–10)
GLUCOSE SERPL-MCNC: 82 MG/DL — SIGNIFICANT CHANGE UP (ref 70–99)
GLUCOSE UR QL: NEGATIVE MG/DL — SIGNIFICANT CHANGE UP
HCT VFR BLD CALC: 40.4 % — SIGNIFICANT CHANGE UP (ref 34.5–45)
HGB BLD-MCNC: 13.9 G/DL — SIGNIFICANT CHANGE UP (ref 11.5–15.5)
IMM GRANULOCYTES NFR BLD AUTO: 0.3 % — SIGNIFICANT CHANGE UP (ref 0–1.5)
INR BLD: 0.98 RATIO — SIGNIFICANT CHANGE UP (ref 0.88–1.16)
KETONES UR-MCNC: NEGATIVE — SIGNIFICANT CHANGE UP
LEUKOCYTE ESTERASE UR-ACNC: NEGATIVE — SIGNIFICANT CHANGE UP
LYMPHOCYTES # BLD AUTO: 1.73 K/UL — SIGNIFICANT CHANGE UP (ref 1–3.3)
LYMPHOCYTES # BLD AUTO: 27.4 % — SIGNIFICANT CHANGE UP (ref 13–44)
MCHC RBC-ENTMCNC: 33.1 PG — SIGNIFICANT CHANGE UP (ref 27–34)
MCHC RBC-ENTMCNC: 34.4 GM/DL — SIGNIFICANT CHANGE UP (ref 32–36)
MCV RBC AUTO: 96.2 FL — SIGNIFICANT CHANGE UP (ref 80–100)
METHADONE UR-MCNC: NEGATIVE — SIGNIFICANT CHANGE UP
MONOCYTES # BLD AUTO: 0.46 K/UL — SIGNIFICANT CHANGE UP (ref 0–0.9)
MONOCYTES NFR BLD AUTO: 7.3 % — SIGNIFICANT CHANGE UP (ref 2–14)
NEUTROPHILS # BLD AUTO: 3.91 K/UL — SIGNIFICANT CHANGE UP (ref 1.8–7.4)
NEUTROPHILS NFR BLD AUTO: 62 % — SIGNIFICANT CHANGE UP (ref 43–77)
NITRITE UR-MCNC: NEGATIVE — SIGNIFICANT CHANGE UP
NRBC # BLD: 0 /100 WBCS — SIGNIFICANT CHANGE UP (ref 0–0)
OPIATES UR-MCNC: NEGATIVE — SIGNIFICANT CHANGE UP
PCP SPEC-MCNC: SIGNIFICANT CHANGE UP
PCP UR-MCNC: NEGATIVE — SIGNIFICANT CHANGE UP
PH UR: 6.5 — SIGNIFICANT CHANGE UP (ref 5–8)
PLATELET # BLD AUTO: 290 K/UL — SIGNIFICANT CHANGE UP (ref 150–400)
POTASSIUM SERPL-MCNC: 3.9 MMOL/L — SIGNIFICANT CHANGE UP (ref 3.5–5.3)
POTASSIUM SERPL-SCNC: 3.9 MMOL/L — SIGNIFICANT CHANGE UP (ref 3.5–5.3)
PROT SERPL-MCNC: 8.5 GM/DL — HIGH (ref 6–8.3)
PROT UR-MCNC: NEGATIVE MG/DL — SIGNIFICANT CHANGE UP
PROTHROM AB SERPL-ACNC: 10.6 SEC — SIGNIFICANT CHANGE UP (ref 9.8–12.7)
RBC # BLD: 4.2 M/UL — SIGNIFICANT CHANGE UP (ref 3.8–5.2)
RBC # FLD: 11.9 % — SIGNIFICANT CHANGE UP (ref 10.3–14.5)
SALICYLATES SERPL-MCNC: <1.7 MG/DL — LOW (ref 2.8–20)
SODIUM SERPL-SCNC: 139 MMOL/L — SIGNIFICANT CHANGE UP (ref 135–145)
SP GR SPEC: 1.01 — SIGNIFICANT CHANGE UP (ref 1.01–1.02)
THC UR QL: NEGATIVE — SIGNIFICANT CHANGE UP
UROBILINOGEN FLD QL: NEGATIVE MG/DL — SIGNIFICANT CHANGE UP
WBC # BLD: 6.31 K/UL — SIGNIFICANT CHANGE UP (ref 3.8–10.5)
WBC # FLD AUTO: 6.31 K/UL — SIGNIFICANT CHANGE UP (ref 3.8–10.5)

## 2018-06-21 PROCEDURE — 93010 ELECTROCARDIOGRAM REPORT: CPT

## 2018-06-21 PROCEDURE — 99285 EMERGENCY DEPT VISIT HI MDM: CPT | Mod: 25

## 2018-06-21 NOTE — ED PROVIDER NOTE - NS_ ATTENDINGSCRIBEDETAILS _ED_A_ED_FT
I, Duglas Echols MD,  performed the initial face to face bedside interview with this patient regarding history of present illness, review of symptoms and relevant past medical, social and family history.  I completed an independent physical examination.    The history, relevant review of systems, past medical and surgical history, medical decision making, and physical examination was documented by the scribe in my presence and I attest to the accuracy of the documentation.

## 2018-06-21 NOTE — ED BEHAVIORAL HEALTH NOTE - BEHAVIORAL HEALTH NOTE
Pt. BIB police who reported son called 911 when pt. called him after she went to court and told him that the call would be the last from her, indicating SIIP. Attempted to call son's phone # listed on facesheet, no answer and no ability to L/M. Only other # is pt's own cell.  Pt has BAL of 286 @1843. Went in to get initial impression from pt. and sleeping soundly. CO maintained. Anticipate pt's BAL will be within normal limits in approximately 6 hrs. (12:45AM). Will give telepsychiatry sign off at 11pm, ED to notify telepsych when medically cleared. Pt. BIB police who reported son called 911 when pt. called him after she went to court and told him that the call would be the last from her, indicating SIIP. Attempted to call son's phone # listed on facesheet, no answer and no ability to L/M. Only other # is pt's own cell.  Pt has BAL of 286 @1843. Went in to get initial impression from pt. and sleeping soundly. CO maintained. Anticipate pt's BAL will be within normal limits in approximately 6 hrs. (12:45AM). Will give telepsychiatry sign off at 11pm, ED to notify telepsych when medically cleared. UTOX negative. Pt. BIB police who reported son called 911 when pt. called him after she went to court and told him that the call would be the last from her, indicating SIIP. Attempted to call son's phone # listed on facesheet, no answer and no ability to L/M. Only other # is pt's own cell.  Pt has BAL of 286 @1843. Went in to get initial impression from pt. and sleeping soundly. CO maintained. Anticipate pt's BAL will be within normal limits in approximately 6 hrs. (12:45AM). Will give telepsychiatry sign off at 11pm, ED to notify telepsych when medically cleared. UTOX negative.    Spoke with daughter(lives with pt) Sanjuana [phone] who reports went to court with mother today and then went to work. Contacted son Ron at [phone] who received call from pt. this PM stating mother said "I am going to kill myself; boateng will be the last person I talk to". "I am hiding and no one can find me". Son reports his sister went to court with pt. "so she couldn't falsify info to protect our Dad".   Pat is now awake and angry and reports son is lying. "He lives with his father now and is not telling the truth"  Son states he hasn't his mother recently. "Never know if this time she'll really do something".

## 2018-06-21 NOTE — ED PROVIDER NOTE - OBJECTIVE STATEMENT
51 y/o female with PMHx of Alcoholism, PTSD, presents to the ED BIBA states that she had a court date with her ex, and when she went home she felt anxious, her heart was pounding and she drank wine for the first time in a while. Pt states that she is under a lot of stress. Pt states she is "all about her kids" and would never hurt herself. Pt states that now she "just wants to go home." Pt states she has "been through hell" and just wants her daughter to be able to graduate from college. Pt states she had at least 4 glasses of wine. Pt is unsure of why the  brought her in, but according to the Police, her son called them stating that she was suicidal.

## 2018-06-21 NOTE — ED ADULT NURSE NOTE - OBJECTIVE STATEMENT
Pt BIB [facility]. Per PD pt made suicidal statements to her son on the phone who called the police to the house. Pt denies SI/HI. Pt denies prior suicide attempts. Pt denies auditory, visual and tactile hallucinations. Pt reports she takes Klonopin for anxiety. Pt reports she was in court today with her ex . Pt states she is upset bc the case "was adjourned until August and he hasn't given me money in 3 years, I don't even have food in my house:. Pt admits to drinking 4 glasses of wine today. Pt is slurring her speech with smell of alcohol on breath. Pt's clothing locked up with security. Pt did NOT have any valuables upon arrival. Pt wanded by security. Pt has 1:! constant observation at bedside for safety.

## 2018-06-21 NOTE — ED PROVIDER NOTE - PROGRESS NOTE DETAILS
Attending Ferguson, telepsychiatry eval and pt can be discharged. Attending Ferguson, telepsychiatry eval and pt can be discharged. Pt ambulating and in NAD.  Clinically sober.

## 2018-06-22 VITALS
TEMPERATURE: 98 F | OXYGEN SATURATION: 100 % | DIASTOLIC BLOOD PRESSURE: 70 MMHG | RESPIRATION RATE: 17 BRPM | SYSTOLIC BLOOD PRESSURE: 148 MMHG | HEART RATE: 62 BPM

## 2018-06-22 DIAGNOSIS — F43.22 ADJUSTMENT DISORDER WITH ANXIETY: ICD-10-CM

## 2018-06-22 DIAGNOSIS — Z78.9 OTHER SPECIFIED HEALTH STATUS: ICD-10-CM

## 2018-06-22 PROCEDURE — 90792 PSYCH DIAG EVAL W/MED SRVCS: CPT | Mod: GT

## 2018-06-22 RX ORDER — CLONAZEPAM 1 MG
1 TABLET ORAL ONCE
Qty: 0 | Refills: 0 | Status: DISCONTINUED | OUTPATIENT
Start: 2018-06-22 | End: 2018-06-22

## 2018-06-22 RX ADMIN — Medication 1 MILLIGRAM(S): at 04:00

## 2018-06-22 NOTE — ED BEHAVIORAL HEALTH ASSESSMENT NOTE - AXIS III
h/o TBI, fractured skull, SDH L forehead, broken clavicles, broken femur [2012] s/p fall, h/o two seizures after head injury

## 2018-06-22 NOTE — ED BEHAVIORAL HEALTH ASSESSMENT NOTE - OTHER
Dora Baptiste partner son conflicts with children, family court with ex-, financial stress deferred financial stressors, family court pearl n/a

## 2018-06-22 NOTE — ED BEHAVIORAL HEALTH ASSESSMENT NOTE - REFERRAL / APPOINTMENT DETAILS
Patient to reach out to prior psychiatrist to reinitiate treatment, also recommended outpatient therapy
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
## 2018-06-22 NOTE — ED BEHAVIORAL HEALTH ASSESSMENT NOTE - OTHER PAST PSYCHIATRIC HISTORY (INCLUDE DETAILS REGARDING ONSET, COURSE OF ILLNESS, INPATIENT/OUTPATIENT TREATMENT)
No outpatient psychiatrist  Saw psychiatrist four years, Therapy prior for three years twice a week with clinical  No outpatient psychiatrist currently, no hospitalization or suicide attempt  Saw psychiatrist four years at time of divorce, Therapy prior to seeing psychiatrist for three years twice a week with clinical
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
## 2018-06-22 NOTE — ED ADULT NURSE REASSESSMENT NOTE - NS ED NURSE REASSESS COMMENT FT1
Pt expresses concern for how long it will take for her to talk to someone at [facility]. Multiple calls to [facility] and they were unable to give an estimated time of when they would be able to see the pt. Pt updated on plan of care. [facility] pending. Pt boyfriend Mendez is expected to visit pt in ER, [facility] attending aware
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
## 2018-06-22 NOTE — ED BEHAVIORAL HEALTH ASSESSMENT NOTE - DESCRIPTION (FIRST USE, LAST USE, QUANTITY, FREQUENCY, DURATION)
prior alcohol use 1.5-2 years prior alcohol use 1.5-2 years around time was , escalated drinking at that time, stopped and was sober 9 months after rehab, restarted drinking only before court dates

## 2018-06-28 ENCOUNTER — EMERGENCY (EMERGENCY)
Facility: HOSPITAL | Age: 53
LOS: 1 days | End: 2018-06-28
Payer: COMMERCIAL

## 2018-06-28 PROCEDURE — 99291 CRITICAL CARE FIRST HOUR: CPT

## 2018-06-28 PROCEDURE — 70450 CT HEAD/BRAIN W/O DYE: CPT | Mod: 26

## 2018-06-28 PROCEDURE — 73562 X-RAY EXAM OF KNEE 3: CPT | Mod: 26,RT

## 2018-06-28 PROCEDURE — 71260 CT THORAX DX C+: CPT | Mod: 26

## 2018-06-28 PROCEDURE — 72125 CT NECK SPINE W/O DYE: CPT | Mod: 26

## 2018-06-28 PROCEDURE — 74177 CT ABD & PELVIS W/CONTRAST: CPT | Mod: 26

## 2018-07-10 ENCOUNTER — EMERGENCY (EMERGENCY)
Facility: HOSPITAL | Age: 53
LOS: 0 days | Discharge: HOME | End: 2018-07-11
Attending: EMERGENCY MEDICINE | Admitting: EMERGENCY MEDICINE
Payer: MEDICAID

## 2018-07-10 VITALS
SYSTOLIC BLOOD PRESSURE: 138 MMHG | OXYGEN SATURATION: 95 % | DIASTOLIC BLOOD PRESSURE: 104 MMHG | TEMPERATURE: 99 F | WEIGHT: 117.07 LBS | HEART RATE: 133 BPM | HEIGHT: 62 IN | RESPIRATION RATE: 18 BRPM

## 2018-07-10 DIAGNOSIS — R94.31 ABNORMAL ELECTROCARDIOGRAM [ECG] [EKG]: ICD-10-CM
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
## 2018-07-10 DIAGNOSIS — S80.212A ABRASION, LEFT KNEE, INITIAL ENCOUNTER: ICD-10-CM

## 2018-07-10 DIAGNOSIS — F10.920 ALCOHOL USE, UNSPECIFIED WITH INTOXICATION, UNCOMPLICATED: ICD-10-CM
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
## 2018-07-10 DIAGNOSIS — S80.919A: ICD-10-CM

## 2018-07-10 DIAGNOSIS — S80.211A ABRASION, RIGHT KNEE, INITIAL ENCOUNTER: ICD-10-CM
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
## 2018-07-10 LAB
ALBUMIN SERPL ELPH-MCNC: 4.7 G/DL — SIGNIFICANT CHANGE UP (ref 3.3–5)
ALP SERPL-CCNC: 144 U/L — HIGH (ref 40–120)
ALT FLD-CCNC: 90 U/L — HIGH (ref 12–78)
ANION GAP SERPL CALC-SCNC: 10 MMOL/L — SIGNIFICANT CHANGE UP (ref 5–17)
AST SERPL-CCNC: 114 U/L — HIGH (ref 15–37)
BASOPHILS # BLD AUTO: 0.04 K/UL — SIGNIFICANT CHANGE UP (ref 0–0.2)
BASOPHILS NFR BLD AUTO: 0.7 % — SIGNIFICANT CHANGE UP (ref 0–2)
BILIRUB SERPL-MCNC: 0.2 MG/DL — SIGNIFICANT CHANGE UP (ref 0.2–1.2)
BUN SERPL-MCNC: 6 MG/DL — LOW (ref 7–23)
CALCIUM SERPL-MCNC: 8.8 MG/DL — SIGNIFICANT CHANGE UP (ref 8.5–10.1)
CHLORIDE SERPL-SCNC: 109 MMOL/L — HIGH (ref 96–108)
CO2 SERPL-SCNC: 24 MMOL/L — SIGNIFICANT CHANGE UP (ref 22–31)
CREAT SERPL-MCNC: 0.72 MG/DL — SIGNIFICANT CHANGE UP (ref 0.5–1.3)
EOSINOPHIL # BLD AUTO: 0.08 K/UL — SIGNIFICANT CHANGE UP (ref 0–0.5)
EOSINOPHIL NFR BLD AUTO: 1.3 % — SIGNIFICANT CHANGE UP (ref 0–6)
ETHANOL SERPL-MCNC: 288 MG/DL — HIGH (ref 0–10)
GLUCOSE SERPL-MCNC: 97 MG/DL — SIGNIFICANT CHANGE UP (ref 70–99)
HCT VFR BLD CALC: 41.6 % — SIGNIFICANT CHANGE UP (ref 34.5–45)
HGB BLD-MCNC: 14.1 G/DL — SIGNIFICANT CHANGE UP (ref 11.5–15.5)
IMM GRANULOCYTES NFR BLD AUTO: 0.7 % — SIGNIFICANT CHANGE UP (ref 0–1.5)
LYMPHOCYTES # BLD AUTO: 1.46 K/UL — SIGNIFICANT CHANGE UP (ref 1–3.3)
LYMPHOCYTES # BLD AUTO: 24.5 % — SIGNIFICANT CHANGE UP (ref 13–44)
MCHC RBC-ENTMCNC: 32.9 PG — SIGNIFICANT CHANGE UP (ref 27–34)
MCHC RBC-ENTMCNC: 33.9 GM/DL — SIGNIFICANT CHANGE UP (ref 32–36)
MCV RBC AUTO: 97.2 FL — SIGNIFICANT CHANGE UP (ref 80–100)
MONOCYTES # BLD AUTO: 0.51 K/UL — SIGNIFICANT CHANGE UP (ref 0–0.9)
MONOCYTES NFR BLD AUTO: 8.6 % — SIGNIFICANT CHANGE UP (ref 2–14)
NEUTROPHILS # BLD AUTO: 3.82 K/UL — SIGNIFICANT CHANGE UP (ref 1.8–7.4)
NEUTROPHILS NFR BLD AUTO: 64.2 % — SIGNIFICANT CHANGE UP (ref 43–77)
NRBC # BLD: 0 /100 WBCS — SIGNIFICANT CHANGE UP (ref 0–0)
PLATELET # BLD AUTO: 277 K/UL — SIGNIFICANT CHANGE UP (ref 150–400)
POTASSIUM SERPL-MCNC: 3.7 MMOL/L — SIGNIFICANT CHANGE UP (ref 3.5–5.3)
POTASSIUM SERPL-SCNC: 3.7 MMOL/L — SIGNIFICANT CHANGE UP (ref 3.5–5.3)
PROT SERPL-MCNC: 8.9 GM/DL — HIGH (ref 6–8.3)
RBC # BLD: 4.28 M/UL — SIGNIFICANT CHANGE UP (ref 3.8–5.2)
RBC # FLD: 11.9 % — SIGNIFICANT CHANGE UP (ref 10.3–14.5)
SODIUM SERPL-SCNC: 143 MMOL/L — SIGNIFICANT CHANGE UP (ref 135–145)
WBC # BLD: 5.95 K/UL — SIGNIFICANT CHANGE UP (ref 3.8–10.5)
WBC # FLD AUTO: 5.95 K/UL — SIGNIFICANT CHANGE UP (ref 3.8–10.5)

## 2018-07-10 PROCEDURE — 99285 EMERGENCY DEPT VISIT HI MDM: CPT | Mod: 25

## 2018-07-10 PROCEDURE — 93010 ELECTROCARDIOGRAM REPORT: CPT

## 2018-07-10 PROCEDURE — 70450 CT HEAD/BRAIN W/O DYE: CPT | Mod: 26

## 2018-07-10 RX ORDER — HALOPERIDOL DECANOATE 100 MG/ML
5 INJECTION INTRAMUSCULAR ONCE
Qty: 0 | Refills: 0 | Status: COMPLETED | OUTPATIENT
Start: 2018-07-10 | End: 2018-07-10

## 2018-07-10 RX ADMIN — HALOPERIDOL DECANOATE 5 MILLIGRAM(S): 100 INJECTION INTRAMUSCULAR at 21:15

## 2018-07-10 RX ADMIN — Medication 2 MILLIGRAM(S): at 21:15

## 2018-07-10 NOTE — ED PROVIDER NOTE - SKIN, MLM
Skin normal color for race, warm, dry. No evidence of rash. +abrasions to chin and left side of upper lip, +BL knee abrasions, left side larger than right
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
## 2018-07-10 NOTE — ED ADULT TRIAGE NOTE - CHIEF COMPLAINT QUOTE
BIB [facility] #[id number], for psych eval per PD. Pt admits to ETOH use today, denies SI/HI. Per PD pt fell today abrasions noted to knees, pt denies head injury. Pt admits to "brain bleed" 3 weeks ago in [facility]

## 2018-07-10 NOTE — ED ADULT NURSE NOTE - CHIEF COMPLAINT QUOTE
BIB [facility] #[id number], for psych eval per PD. Pt admits to ETOH use today, denies SI/HI. Per PD pt fell today abrasions noted to knees, pt denies head injury. Pt admits to "brain bleed" 3 weeks ago in [facility]

## 2018-07-10 NOTE — ED PROVIDER NOTE - PROGRESS NOTE DETAILS
ct head negative. await sobriety. MD Balaji SARKISG:  Received signout from Dr. Le to observe patient overnight and discharge when patient is sober. Patient now awake and able to walk on her own to the bathroom.  She is cooperative and quiet and does not have any complaints.

## 2018-07-10 NOTE — ED ADULT NURSE REASSESSMENT NOTE - NS ED NURSE REASSESS COMMENT FT1
1:1 maintained for safety. pts belongings (dress and shoes) sent to security w/ JUAN DANIEL Whatley. pt wanded by security @ [id number]. trauma alert cleared by MD Le @2330, head CT neg. pt resting comfortably, asleep at this time. plan for MTF. will cont to monitor.

## 2018-07-10 NOTE — ED PROVIDER NOTE - OBJECTIVE STATEMENT
51 y/o f with PMHx of EtOH abuse presenting to the ED BIB [facility] #[id number] for psych eval per PD. Pt admits to ETOH use today, reports drinking x3 glasses of wine today at 4 pm. Denies SI/HI. Per PD pt was walking in and out of traffic today when they witnessed her fall on the road with abrasions to knees, denies head injury. Pt states abrasions to knees are due to falling down steps. Daughter reports hx of "brain bleed" x3 weeks ago, treated at [facility]. Pt states Tetanus is UTD.

## 2018-07-10 NOTE — ED PROVIDER NOTE - CARE PLAN
Principal Discharge DX:	Alcohol intoxication in active alcoholic without complication  Secondary Diagnosis:	Abrasion of knee, bilateral

## 2018-07-10 NOTE — ED PROVIDER NOTE - CONSTITUTIONAL, MLM
normal... +slurred speech, unorganized thought processes, awake, alert, oriented to person, place, time/situation and in no apparent distress.

## 2018-07-11 VITALS
DIASTOLIC BLOOD PRESSURE: 67 MMHG | OXYGEN SATURATION: 100 % | RESPIRATION RATE: 17 BRPM | SYSTOLIC BLOOD PRESSURE: 102 MMHG | TEMPERATURE: 99 F | HEART RATE: 108 BPM

## 2018-11-04 ENCOUNTER — EMERGENCY (EMERGENCY)
Facility: HOSPITAL | Age: 53
LOS: 1 days | Discharge: HOME | End: 2018-11-04
Attending: EMERGENCY MEDICINE
Payer: MEDICAID

## 2018-11-04 VITALS
HEART RATE: 65 BPM | OXYGEN SATURATION: 99 % | SYSTOLIC BLOOD PRESSURE: 125 MMHG | RESPIRATION RATE: 16 BRPM | DIASTOLIC BLOOD PRESSURE: 72 MMHG

## 2018-11-04 VITALS — HEIGHT: 62 IN | WEIGHT: 123.02 LBS

## 2018-11-04 LAB
ALBUMIN SERPL ELPH-MCNC: 5 G/DL — SIGNIFICANT CHANGE UP (ref 3.3–5.2)
ALP SERPL-CCNC: 154 U/L — HIGH (ref 40–120)
ALT FLD-CCNC: 40 U/L — HIGH
ANION GAP SERPL CALC-SCNC: 17 MMOL/L — SIGNIFICANT CHANGE UP (ref 5–17)
AST SERPL-CCNC: 38 U/L — HIGH
BASOPHILS # BLD AUTO: 0 K/UL — SIGNIFICANT CHANGE UP (ref 0–0.2)
BASOPHILS NFR BLD AUTO: 0.2 % — SIGNIFICANT CHANGE UP (ref 0–2)
BILIRUB SERPL-MCNC: 0.3 MG/DL — LOW (ref 0.4–2)
BUN SERPL-MCNC: 10 MG/DL — SIGNIFICANT CHANGE UP (ref 8–20)
CALCIUM SERPL-MCNC: 9.9 MG/DL — SIGNIFICANT CHANGE UP (ref 8.6–10.2)
CHLORIDE SERPL-SCNC: 97 MMOL/L — LOW (ref 98–107)
CO2 SERPL-SCNC: 24 MMOL/L — SIGNIFICANT CHANGE UP (ref 22–29)
CREAT SERPL-MCNC: 0.44 MG/DL — LOW (ref 0.5–1.3)
EOSINOPHIL # BLD AUTO: 0 K/UL — SIGNIFICANT CHANGE UP (ref 0–0.5)
EOSINOPHIL NFR BLD AUTO: 0.7 % — SIGNIFICANT CHANGE UP (ref 0–6)
ETHANOL SERPL-MCNC: <10 MG/DL — SIGNIFICANT CHANGE UP
GLUCOSE SERPL-MCNC: 95 MG/DL — SIGNIFICANT CHANGE UP (ref 70–115)
HCT VFR BLD CALC: 43.6 % — SIGNIFICANT CHANGE UP (ref 37–47)
HGB BLD-MCNC: 14.7 G/DL — SIGNIFICANT CHANGE UP (ref 12–16)
LYMPHOCYTES # BLD AUTO: 1.6 K/UL — SIGNIFICANT CHANGE UP (ref 1–4.8)
LYMPHOCYTES # BLD AUTO: 27.1 % — SIGNIFICANT CHANGE UP (ref 20–55)
MAGNESIUM SERPL-MCNC: 1.9 MG/DL — SIGNIFICANT CHANGE UP (ref 1.6–2.6)
MCHC RBC-ENTMCNC: 33.1 PG — HIGH (ref 27–31)
MCHC RBC-ENTMCNC: 33.7 G/DL — SIGNIFICANT CHANGE UP (ref 32–36)
MCV RBC AUTO: 98.2 FL — SIGNIFICANT CHANGE UP (ref 81–99)
MONOCYTES # BLD AUTO: 0.6 K/UL — SIGNIFICANT CHANGE UP (ref 0–0.8)
MONOCYTES NFR BLD AUTO: 10.6 % — HIGH (ref 3–10)
NEUTROPHILS # BLD AUTO: 3.6 K/UL — SIGNIFICANT CHANGE UP (ref 1.8–8)
NEUTROPHILS NFR BLD AUTO: 61.1 % — SIGNIFICANT CHANGE UP (ref 37–73)
PHOSPHATE SERPL-MCNC: 3.1 MG/DL — SIGNIFICANT CHANGE UP (ref 2.4–4.7)
PLATELET # BLD AUTO: 244 K/UL — SIGNIFICANT CHANGE UP (ref 150–400)
POTASSIUM SERPL-MCNC: 4.1 MMOL/L — SIGNIFICANT CHANGE UP (ref 3.5–5.3)
POTASSIUM SERPL-SCNC: 4.1 MMOL/L — SIGNIFICANT CHANGE UP (ref 3.5–5.3)
PROT SERPL-MCNC: 8.2 G/DL — SIGNIFICANT CHANGE UP (ref 6.6–8.7)
RBC # BLD: 4.44 M/UL — SIGNIFICANT CHANGE UP (ref 4.4–5.2)
RBC # FLD: 12.7 % — SIGNIFICANT CHANGE UP (ref 11–15.6)
SODIUM SERPL-SCNC: 138 MMOL/L — SIGNIFICANT CHANGE UP (ref 135–145)
WBC # BLD: 6 K/UL — SIGNIFICANT CHANGE UP (ref 4.8–10.8)
WBC # FLD AUTO: 6 K/UL — SIGNIFICANT CHANGE UP (ref 4.8–10.8)

## 2018-11-04 PROCEDURE — 99284 EMERGENCY DEPT VISIT MOD MDM: CPT

## 2018-11-04 PROCEDURE — 83735 ASSAY OF MAGNESIUM: CPT

## 2018-11-04 PROCEDURE — 84100 ASSAY OF PHOSPHORUS: CPT

## 2018-11-04 PROCEDURE — 36415 COLL VENOUS BLD VENIPUNCTURE: CPT

## 2018-11-04 PROCEDURE — 96374 THER/PROPH/DIAG INJ IV PUSH: CPT

## 2018-11-04 PROCEDURE — 80053 COMPREHEN METABOLIC PANEL: CPT

## 2018-11-04 PROCEDURE — 85027 COMPLETE CBC AUTOMATED: CPT

## 2018-11-04 PROCEDURE — 80307 DRUG TEST PRSMV CHEM ANLYZR: CPT

## 2018-11-04 PROCEDURE — 99284 EMERGENCY DEPT VISIT MOD MDM: CPT | Mod: 25

## 2018-11-04 RX ORDER — SODIUM CHLORIDE 9 MG/ML
1000 INJECTION INTRAMUSCULAR; INTRAVENOUS; SUBCUTANEOUS ONCE
Qty: 0 | Refills: 0 | Status: COMPLETED | OUTPATIENT
Start: 2018-11-04 | End: 2018-11-04

## 2018-11-04 RX ADMIN — SODIUM CHLORIDE 1000 MILLILITER(S): 9 INJECTION INTRAMUSCULAR; INTRAVENOUS; SUBCUTANEOUS at 15:37

## 2018-11-04 RX ADMIN — Medication 50 MILLIGRAM(S): at 15:15

## 2018-11-04 RX ADMIN — Medication 50 MILLIGRAM(S): at 20:15

## 2018-11-04 RX ADMIN — Medication 2 MILLIGRAM(S): at 15:15

## 2018-11-04 NOTE — ED PROVIDER NOTE - PROGRESS NOTE DETAILS
Pt feeling improved from prior. CIWA came from 17 down to 8. Minimal tremor with outstretched hands;  plan to redose librium and reevluate. Pt feeling much better. Repeat CIWA 3.  States tingling/ tremor resolved.  Pt asking to be discharged.

## 2018-11-04 NOTE — ED PROVIDER NOTE - OBJECTIVE STATEMENT
53yoF with h/o chronic alcohol use  (2 bottles of wine/ daily x 6 years), presenting with desire for detox. Pt reports prior klonopin use for panic disorder, but use occasional and last use several days ago. Pt tried stopping the alcohol last night but wasn't feeling well and woke up sweating, so drank a glass of wine around noon.  Pt reports recent detox at [facility] 1 year ago. No prior history of seizures.  Not on any other medications and denies any drug use.  Pt does not want to go to detox or be hospitalized because her daughter is having cardiac surgery on Tuesday and she needs to be home for her. Pt reports anxiety, shakiness, sweating, headache.

## 2018-11-04 NOTE — ED PROVIDER NOTE - NEUROLOGICAL, MLM
Alert and oriented, no focal deficits, no motor or sensory deficits. tremor noted with hands extended.

## 2018-11-04 NOTE — ED STATDOCS - PROGRESS NOTE DETAILS
52 y/o F pt with hx of EtOH abuse and subdural hematoma presents to the ED c/o detox. She has been abusing alcohol for the last 6 years, and tried to stop as of 5 days ago. She is experiencing withdrawal sx and is shaking, has diaphoresis and is having trouble sleeping. Pt does not want to go anywhere for detox, she just wants to feel better. Pt denies fever and vomiting. No further complaints at this time. Pt sent to [facility] for further evaluation.   PE: no tongue vermiculations. Visual tremor. Will give Ativan 54 y/o F pt with hx of EtOH abuse and subdural hematoma presents to the ED c/o detox. She has been abusing alcohol for the last 6 years, and tried to stop as of 5 days ago. She feels that she is going through withdrawal reporting shaking, diaphoresis and having trouble sleeping. Pt does not want to go  to detox, she just wants to feel better. Pt denies fever and vomiting. No further complaints at this time. Pt sent to main for further evaluation.   PE: no tongue fasciculations.  Visual tremor noted.

## 2018-11-04 NOTE — ED ADULT NURSE NOTE - OBJECTIVE STATEMENT
Pt awake, alert and oriented x3 c/o alcohol and benzo withdrawal.  Pt states she normally drinks about 9 glasses of wine a day and is looking to detox.  Pt states she has been detoxing on her own for past few days but states she can not do it alone.  Pt states she had one glass of wine this morning to be able to function but wants to quit.  Pt currently c/o sweats, anxiety, headache and tremors.  Pt denies chest pain or shortness of breath.  Respirations even and unlabored.  Abdomen soft, nondistended.  Moving all extremities well and with purpose.  Ambulating with steady gait.  Pt received in yellow gown with no belongings at bedside.

## 2018-11-04 NOTE — ED ADULT TRIAGE NOTE - CHIEF COMPLAINT QUOTE
pt reports she has been abusing alcohol for past 6 years, tried to stop for past 5 days, experiencing withdrawal symptoms. c/o shaking, sweats, and inability to sleep. pt with noticeable tremors. pt reports she has been abusing alcohol for past 6 years, tried to stop for past 5 days, experiencing withdrawal symptoms. c/o shaking, sweats, and inability to sleep. pt with noticeable tremors, appears anxious.

## 2018-11-04 NOTE — ED ADULT NURSE NOTE - NSIMPLEMENTINTERV_GEN_ALL_ED
Implemented All Universal Safety Interventions:  [place] to call system. Call bell, personal items and telephone within reach. Instruct patient to call for assistance. Room bathroom lighting operational. Non-slip footwear when patient is off stretcher. Physically safe environment: no spills, clutter or unnecessary equipment. Stretcher in lowest position, wheels locked, appropriate side rails in place.

## 2018-11-04 NOTE — ED ADULT NURSE NOTE - CHIEF COMPLAINT QUOTE
pt reports she has been abusing alcohol for past 6 years, tried to stop for past 5 days, experiencing withdrawal symptoms. c/o shaking, sweats, and inability to sleep. pt with noticeable tremors, appears anxious.

## 2018-11-04 NOTE — ED PROVIDER NOTE - MEDICAL DECISION MAKING DETAILS
Pt presents for symptoms of alcohol withdrawal ; pt overall well appearing but noted to be mildly diaphoretic with tremors when extending hands; other items on CIWA subjective symptoms; pt refusing admission; will attempt to establish appropriate taper with librium after initial dose of ativan; will reevaluate.

## 2018-11-29 NOTE — ED PROVIDER NOTE - ST/T WAVE
no ST elevation
Airway patent, Nasal mucosa clear. Mouth with normal mucosa. Throat has no vesicles, no oropharyngeal exudates and uvula is midline.

## 2018-12-26 ENCOUNTER — INPATIENT (INPATIENT)
Facility: HOSPITAL | Age: 53
LOS: 3 days | Discharge: HOME | DRG: 897 | End: 2018-12-30
Attending: INTERNAL MEDICINE
Payer: MEDICAID

## 2018-12-26 VITALS
RESPIRATION RATE: 16 BRPM | HEART RATE: 118 BPM | OXYGEN SATURATION: 100 % | DIASTOLIC BLOOD PRESSURE: 112 MMHG | WEIGHT: 123.9 LBS | SYSTOLIC BLOOD PRESSURE: 162 MMHG | TEMPERATURE: 98 F | HEIGHT: 64 IN

## 2018-12-26 DIAGNOSIS — F10.239 ALCOHOL DEPENDENCE WITH WITHDRAWAL, UNSPECIFIED: ICD-10-CM

## 2018-12-26 LAB
ALBUMIN SERPL ELPH-MCNC: 5.3 G/DL — HIGH (ref 3.3–5.2)
ALP SERPL-CCNC: 197 U/L — HIGH (ref 40–120)
ALT FLD-CCNC: 80 U/L — HIGH
ANION GAP SERPL CALC-SCNC: 18 MMOL/L — HIGH (ref 5–17)
APPEARANCE UR: CLEAR — SIGNIFICANT CHANGE UP
APTT BLD: 33.4 SEC — SIGNIFICANT CHANGE UP (ref 27.5–36.3)
AST SERPL-CCNC: 146 U/L — HIGH
BACTERIA # UR AUTO: ABNORMAL
BASOPHILS # BLD AUTO: 0 K/UL — SIGNIFICANT CHANGE UP (ref 0–0.2)
BASOPHILS NFR BLD AUTO: 0.5 % — SIGNIFICANT CHANGE UP (ref 0–2)
BILIRUB SERPL-MCNC: 0.6 MG/DL — SIGNIFICANT CHANGE UP (ref 0.4–2)
BILIRUB UR-MCNC: ABNORMAL
BUN SERPL-MCNC: 12 MG/DL — SIGNIFICANT CHANGE UP (ref 8–20)
CALCIUM SERPL-MCNC: 9.6 MG/DL — SIGNIFICANT CHANGE UP (ref 8.6–10.2)
CHLORIDE SERPL-SCNC: 94 MMOL/L — LOW (ref 98–107)
CO2 SERPL-SCNC: 25 MMOL/L — SIGNIFICANT CHANGE UP (ref 22–29)
COLOR SPEC: ABNORMAL
CREAT SERPL-MCNC: 0.55 MG/DL — SIGNIFICANT CHANGE UP (ref 0.5–1.3)
DIFF PNL FLD: ABNORMAL
EOSINOPHIL # BLD AUTO: 0 K/UL — SIGNIFICANT CHANGE UP (ref 0–0.5)
EOSINOPHIL NFR BLD AUTO: 0.3 % — SIGNIFICANT CHANGE UP (ref 0–6)
EPI CELLS # UR: ABNORMAL
ETHANOL SERPL-MCNC: <10 MG/DL — SIGNIFICANT CHANGE UP
GLUCOSE SERPL-MCNC: 98 MG/DL — SIGNIFICANT CHANGE UP (ref 70–115)
GLUCOSE UR QL: NEGATIVE MG/DL — SIGNIFICANT CHANGE UP
HCG SERPL-ACNC: <4 MIU/ML — SIGNIFICANT CHANGE UP
HCT VFR BLD CALC: 44.3 % — SIGNIFICANT CHANGE UP (ref 37–47)
HGB BLD-MCNC: 14.9 G/DL — SIGNIFICANT CHANGE UP (ref 12–16)
INR BLD: 0.94 RATIO — SIGNIFICANT CHANGE UP (ref 0.88–1.16)
KETONES UR-MCNC: ABNORMAL
LEUKOCYTE ESTERASE UR-ACNC: ABNORMAL
LYMPHOCYTES # BLD AUTO: 1.6 K/UL — SIGNIFICANT CHANGE UP (ref 1–4.8)
LYMPHOCYTES # BLD AUTO: 24.6 % — SIGNIFICANT CHANGE UP (ref 20–55)
MAGNESIUM SERPL-MCNC: 2.1 MG/DL — SIGNIFICANT CHANGE UP (ref 1.6–2.6)
MCHC RBC-ENTMCNC: 32.5 PG — HIGH (ref 27–31)
MCHC RBC-ENTMCNC: 33.6 G/DL — SIGNIFICANT CHANGE UP (ref 32–36)
MCV RBC AUTO: 96.5 FL — SIGNIFICANT CHANGE UP (ref 81–99)
MONOCYTES # BLD AUTO: 0.4 K/UL — SIGNIFICANT CHANGE UP (ref 0–0.8)
MONOCYTES NFR BLD AUTO: 6.6 % — SIGNIFICANT CHANGE UP (ref 3–10)
NEUTROPHILS # BLD AUTO: 4.5 K/UL — SIGNIFICANT CHANGE UP (ref 1.8–8)
NEUTROPHILS NFR BLD AUTO: 67.8 % — SIGNIFICANT CHANGE UP (ref 37–73)
NITRITE UR-MCNC: NEGATIVE — SIGNIFICANT CHANGE UP
PH UR: 5 — SIGNIFICANT CHANGE UP (ref 5–8)
PLATELET # BLD AUTO: 215 K/UL — SIGNIFICANT CHANGE UP (ref 150–400)
POTASSIUM SERPL-MCNC: 4.2 MMOL/L — SIGNIFICANT CHANGE UP (ref 3.5–5.3)
POTASSIUM SERPL-SCNC: 4.2 MMOL/L — SIGNIFICANT CHANGE UP (ref 3.5–5.3)
PROT SERPL-MCNC: 8.2 G/DL — SIGNIFICANT CHANGE UP (ref 6.6–8.7)
PROT UR-MCNC: 30 MG/DL
PROTHROM AB SERPL-ACNC: 10.8 SEC — SIGNIFICANT CHANGE UP (ref 10–12.9)
RBC # BLD: 4.59 M/UL — SIGNIFICANT CHANGE UP (ref 4.4–5.2)
RBC # FLD: 13.4 % — SIGNIFICANT CHANGE UP (ref 11–15.6)
RBC CASTS # UR COMP ASSIST: SIGNIFICANT CHANGE UP /HPF (ref 0–4)
SODIUM SERPL-SCNC: 137 MMOL/L — SIGNIFICANT CHANGE UP (ref 135–145)
SP GR SPEC: 1.02 — SIGNIFICANT CHANGE UP (ref 1.01–1.02)
UROBILINOGEN FLD QL: 1 MG/DL
WBC # BLD: 6.6 K/UL — SIGNIFICANT CHANGE UP (ref 4.8–10.8)
WBC # FLD AUTO: 6.6 K/UL — SIGNIFICANT CHANGE UP (ref 4.8–10.8)
WBC UR QL: SIGNIFICANT CHANGE UP

## 2018-12-26 PROCEDURE — 76705 ECHO EXAM OF ABDOMEN: CPT | Mod: 26

## 2018-12-26 PROCEDURE — 99284 EMERGENCY DEPT VISIT MOD MDM: CPT

## 2018-12-26 PROCEDURE — 99223 1ST HOSP IP/OBS HIGH 75: CPT

## 2018-12-26 RX ORDER — SODIUM CHLORIDE 9 MG/ML
1000 INJECTION INTRAMUSCULAR; INTRAVENOUS; SUBCUTANEOUS ONCE
Qty: 0 | Refills: 0 | Status: COMPLETED | OUTPATIENT
Start: 2018-12-26 | End: 2018-12-26

## 2018-12-26 RX ORDER — FOLIC ACID 0.8 MG
1 TABLET ORAL DAILY
Qty: 0 | Refills: 0 | Status: DISCONTINUED | OUTPATIENT
Start: 2018-12-26 | End: 2018-12-30

## 2018-12-26 RX ORDER — THIAMINE MONONITRATE (VIT B1) 100 MG
100 TABLET ORAL DAILY
Qty: 0 | Refills: 0 | Status: COMPLETED | OUTPATIENT
Start: 2018-12-26 | End: 2018-12-29

## 2018-12-26 RX ORDER — ENOXAPARIN SODIUM 100 MG/ML
40 INJECTION SUBCUTANEOUS DAILY
Qty: 0 | Refills: 0 | Status: DISCONTINUED | OUTPATIENT
Start: 2018-12-26 | End: 2018-12-30

## 2018-12-26 RX ORDER — SODIUM CHLORIDE 9 MG/ML
1000 INJECTION, SOLUTION INTRAVENOUS
Qty: 0 | Refills: 0 | Status: DISCONTINUED | OUTPATIENT
Start: 2018-12-26 | End: 2018-12-27

## 2018-12-26 RX ADMIN — Medication 50 MILLIGRAM(S): at 22:01

## 2018-12-26 RX ADMIN — SODIUM CHLORIDE 125 MILLILITER(S): 9 INJECTION, SOLUTION INTRAVENOUS at 21:16

## 2018-12-26 RX ADMIN — Medication 50 MILLIGRAM(S): at 15:42

## 2018-12-26 RX ADMIN — SODIUM CHLORIDE 1000 MILLILITER(S): 9 INJECTION INTRAMUSCULAR; INTRAVENOUS; SUBCUTANEOUS at 18:43

## 2018-12-26 RX ADMIN — Medication 2 MILLIGRAM(S): at 18:43

## 2018-12-26 RX ADMIN — Medication 2 MILLIGRAM(S): at 15:42

## 2018-12-26 NOTE — H&P ADULT - FAMILY HISTORY
Father  Still living? Unknown  Family history of anxiety disorder, Age at diagnosis: Age Unknown     Mother  Still living? Unknown  Family history of diabetes mellitus (DM), Age at diagnosis: Age Unknown

## 2018-12-26 NOTE — ED ADULT NURSE REASSESSMENT NOTE - NS ED NURSE REASSESS COMMENT FT1
Pt care assumed at this time, pt recvd sitting up on stretcher, A&Ox3, admitting physician [place]ogan at bedside, POC being discussed, pt verbalizes understanding, safety maintained, pt to be transferred to holding unit pending further orders, pt verbalize understanding and agree, safety maintained.

## 2018-12-26 NOTE — ED PROVIDER NOTE - ENMT, MLM
Airway patent, Nasal mucosa clear. Mouth with normal mucosa. Throat has no vesicles, no oropharyngeal exudates and uvula is midline. + tongue fasciculations

## 2018-12-26 NOTE — ED ADULT TRIAGE NOTE - CHIEF COMPLAINT QUOTE
pt comes to ed stating she got call from walk in for elevated lfts. patient reports she has been drinking 2+ large bottles of chardonnay and now has added vodka. patient has been shaking. drank 1 5 oz glass of wine today to stop the shaking. wants detox

## 2018-12-26 NOTE — H&P ADULT - NSHPLABSRESULTS_GEN_ALL_CORE
14.9   6.6   )-----------( 215      ( 26 Dec 2018 16:00 )             44.3       12-26    137  |  94<L>  |  12.0  ----------------------------<  98  4.2   |  25.0  |  0.55    Ca    9.6      26 Dec 2018 16:56  Mg     2.1     12-26    TPro  8.2  /  Alb  5.3<H>  /  TBili  0.6  /  DBili  x   /  AST  146<H>  /  ALT  80<H>  /  AlkPhos  197<H>  12-26    EKG: NSR, H 100, QTc 446, no ST-T changes

## 2018-12-26 NOTE — H&P ADULT - HISTORY OF PRESENT ILLNESS
53yoF hx alcohol abuse, HTN, not on meds, anxiety presenting with desire for alcohol detox.  Pt has been drinking 2 bottles of wine daily since 2015 and over the past several weeks has starting using 3-4oz of vodka daily.  She reports that about 1 week ago she attempted to abstain from alcohol for about 4 days and developed tremors, confusion, diaphoresis and due to her symptoms she had to start consuming alcohol again.  She decided to come to ED today after she became very emotional after spending [place] daily alone without family and is determined to quit alcohol. Pt also endorsing intermittent, non-radiating crampy RUQ abdominal pain for the past several weeks.  She also endorses chills.  Of note, pt reports that she was recently prescribed ciprofloxacin for UTI about 3 days ago after she presented with urinary frequency and dysuria.  In ED, pt tachycardia and hypertensive.  Labs remarkable for alcohol level <10 and elevated LFTs (, ALT 80).  RUQ US showed fatty infiltration of liver. 53yoF hx alcohol abuse, HTN, not on meds, anxiety presenting with desire for alcohol detox.  Pt has been drinking 2 bottles of wine daily since 2015 and over the past several weeks has starting using 3-4oz of vodka daily.  She reports that about 1 week ago she attempted to abstain from alcohol for about 4 days and developed tremors, confusion, diaphoresis and due to her symptoms she had to start consuming alcohol again.  Pt does not follow with a regular PMD.  She typically goes to urgent care center where she gets Klonopin prescribed for anxiety and they advised her to go to ED for further eval due to elevated LFTs.  Pt endorsing intermittent, non-radiating crampy RUQ abdominal pain for the past several weeks.  In ED, pt tachycardia and hypertensive.  Labs remarkable for alcohol level <10 and elevated LFTs (, ALT 80).  RUQ US showed fatty infiltration of liver. 53yoF hx alcohol abuse, HTN not on meds, anxiety, sent from urgent care for elevated LFTs and presenting with desire for alcohol detox.  Pt has been drinking 2 bottles of wine daily since 2015 and over the past several weeks has starting using 3-4oz of vodka daily in addition to the wine.  She reports that about 1 week ago she attempted to abstain from alcohol for about 4 days and developed tremors, confusion, diaphoresis and due to her symptoms she had to start consuming alcohol again.  Pt does not follow with a regular PMD.  She typically goes to urgent care center where she gets Klonopin prescribed for anxiety and they advised her to go to ED for further eval due to elevated LFTs.  Pt say she also became very emotional after spending [place] alone without family and this is motivating her to become sober.  Her last drink was in the early AM of 12/26.  Pt endorsing intermittent, non-radiating crampy RUQ abdominal pain for the past several weeks.  In ED, pt tachycardia and hypertensive.  Labs remarkable for alcohol level <10 and elevated LFTs (, ALT 80).  RUQ US showed fatty infiltration of liver.

## 2018-12-26 NOTE — H&P ADULT - NSHPPHYSICALEXAM_GEN_ALL_CORE
Vital Signs Last 24 Hrs  T(C): 37.3 (26 Dec 2018 20:43), Max: 37.4 (26 Dec 2018 18:57)  T(F): 99.2 (26 Dec 2018 20:43), Max: 99.3 (26 Dec 2018 18:57)  HR: 111 (26 Dec 2018 20:43) (111 - 118)  BP: 111/66 (26 Dec 2018 20:43) (111/66 - 162/112)  BP(mean): 106 (26 Dec 2018 18:57) (106 - 106)  RR: 18 (26 Dec 2018 20:43) (16 - 18)  SpO2: 97% (26 Dec 2018 20:43) (96% - 100%)    GENERAL:  Well-appearing, not in acute distress  EYES:  Clear conjunctiva, pupils reactive to light  ENT: Moist mucous membranes  RESP:  Non-labored breathing pattern, lungs clear to ausculation  CV: Regular rate and rhythm, no murmurs appreciated, no lower extremity edema  GI: Soft, non-tender, non-distended  NEURO: Awake, alert, conversant, bilateral hand tremors, upper and lower extremity strength 5/5, light touch sensation grossly intact  PSYCH: Mildly anxious, cooperative  SKIN: No rash or lesions, warm and dry Vital Signs Last 24 Hrs  T(C): 37.3 (26 Dec 2018 20:43), Max: 37.4 (26 Dec 2018 18:57)  T(F): 99.2 (26 Dec 2018 20:43), Max: 99.3 (26 Dec 2018 18:57)  HR: 111 (26 Dec 2018 20:43) (111 - 118)  BP: 111/66 (26 Dec 2018 20:43) (111/66 - 162/112)  BP(mean): 106 (26 Dec 2018 18:57) (106 - 106)  RR: 18 (26 Dec 2018 20:43) (16 - 18)  SpO2: 97% (26 Dec 2018 20:43) (96% - 100%)    GENERAL:  Well-appearing, not in acute distress  EYES:  Clear conjunctiva, pupils reactive to light  ENT: Moist mucous membranes, mild tongue fasciculations   RESP:  Non-labored breathing pattern, lungs clear to ausculation  CV: Regular rate and rhythm, no murmurs appreciated, no lower extremity edema  GI: Soft, non-tender, non-distended  NEURO: Awake, alert, conversant, bilateral hand tremor, upper and lower extremity strength 5/5, light touch sensation grossly intact  PSYCH: Mildly anxious, cooperative  SKIN: Abrasions noted on dorsum of right foot and right leg, warm and dry

## 2018-12-26 NOTE — ED PROVIDER NOTE - MEDICAL DECISION MAKING DETAILS
Pt presents with tachycardia, hypertension from alcohol withdrawal; pt has visible tremor and tongue fasciculations; plan to start fluids, ativan, librium, reevaluate; pt requesting RUQ US to evaluate for transaminases

## 2018-12-26 NOTE — CONSULT NOTE ADULT - SUBJECTIVE AND OBJECTIVE BOX
REASON FOR Tele-evaluation    SUBJECTIVE: sent in from walking clinic for abnormal Lft's.     HPI Objective Statement: daily x 6 years), presenting with desire for detox. Pt went to walk in clinic today due to falling 3.5 days ago; denies head injury/ LOC. Pt reports RUQ pain 2 weeks; intermittent;  pt denies any alleviating or aggravating fractures.  Last drink this morning - small ?shot of wine.  Pt states she was previously drinking wine but now added liquor.  Pt reports drinking 2 over sized bottles of wine/ daily.  pt taking 2 mini bottles of vodka daily.  Pt reports prior klonopin use for panic disorder, but use occasional and last use several days ago. Pt reports recent detox at [facility] 1 year ago. No prior history of seizures.  Not on any other medications and denies any drug use. Pt reports headache, perioral numbness, anxious and sweaty. Pt was here recently and did not want to stay so was sent home w/ librium but states it did not help    Above ED HPI reviewed and confirmed with patient. Nothing additional to add.    OBJECTIVE  ROS:  all other negative except as documented above.     PHYSICAL EXAM: Tele-evaluation precludes physical exam.       LABS: All Labs Reviewed:                        14.9   6.6   )-----------( 215      ( 26 Dec 2018 16:00 )             44.3     12-26    137  |  94<L>  |  12.0  ----------------------------<  98  4.2   |  25.0  |  0.55    Ca    9.6      26 Dec 2018 16:56  Mg     2.1     12-26    TPro  8.2  /  Alb  5.3<H>  /  TBili  0.6  /  DBili  x   /  AST  146<H>  /  ALT  80<H>  /  AlkPhos  197<H>  12-26    PT/INR - ( 26 Dec 2018 16:00 )   PT: 10.8 sec;   INR: 0.94 ratio         PTT - ( 26 Dec 2018 16:00 )  PTT:33.4 sec      Blood Culture:     RADIOLOGY/EKG:  There is diffuse fatty infiltration of liver parenchyma.      There are no liver masses.    There is no intrahepatic biliary ductal dilatation.  The portal vein shows hepatopedal flow.    The gallbladder  is within normal limits.    There is no gallbladder wall thickening.   The common duct measures 1.7 mm in maximum diameter.    The patient is taking pain medication. Norton's sign not applicable.        The pancreatic head, neck, and proximal body are intact. The midbody and   tail of pancreas are obscured by bowel gas.     The right kidney measures 9.9 cm in length and shows normal parenchymal   echogenicity, without stones or hydronephrosis.    Visualized segments of abdominal aorta are within normal limits by   sonographic criteria. IVC is unremarkable. No retroperitoneal mass seen.    IMPRESSION:   Fatty infiltration of liver otherwise Unremarkable right upper quadrant   ultrasonography.      ASSESSMENT AND PLAN:     ETOH withdrawal   -	Admit to medical unit  -	Monitor for DT’s  -	CIWA protocol, symptoms trigger with Ativan  -	Consider Librium taper  -	Regular diet  -	 consult pt is requesting for [facility] rehab  Depression with PTSD  -	Psychiatry consult   -	Requesting anti-Depression   [facility] at [facility]    Care plan discussed with Dr. CHIARA De La Cruz

## 2018-12-26 NOTE — ED ADULT NURSE NOTE - OBJECTIVE STATEMENT
patient c/o elevated liver enzymes and etoh withdrawal. was here a couple of weeks ago and AMA'd but now wants to be seen and evaluated. c/o ETOH withdrawal. last drink was 4oz of wine at 3pm. patient normally drinks everyday.

## 2018-12-26 NOTE — H&P ADULT - ASSESSMENT
53yoF hx alcohol abuse, HTN, not on meds, anxiety presenting with desire for alcohol detox, admitted for alcohol withdrawal    #Alcohol withdrawal  -Admit to medicine  -[facility] protocol with librium taper  -Banana bag, multivitamins, electrolyte monitoring  -Psychiatry consult    #Elevated LFTs- in setting of alcoholism, RUQ US shows fatty infiltration, no prior hepatitis panel in EMR, will order and trend LFTs    #HTN- hypertension in setting of alcohol withdrawal, will monitor off meds for now    #Anxiety- will hold off on Klonopin PRN as pt will be on librium taper, iSTOP reference #[id number] show pt received Klonopin     #Prophylactic measure- lovenox 53yoF hx alcohol abuse, HTN, not on meds, anxiety sent from urgent care for elevated LFTs and presenting with desire for alcohol detox, admitted for alcohol withdrawal    #Alcohol withdrawal  -Admit to medicine  -[facility] protocol with librium taper  -Banana bag, multivitamins, electrolyte monitoring  -Seizure, aspiration precautions   -Psychiatry consulted for consult in AM  -Social work consult    #Elevated LFTs- in setting of alcoholism, RUQ US shows fatty infiltration, no prior hepatitis panel in EMR, will order and trend LFTs    #HTN- hypertension in setting of alcohol withdrawal, will monitor off meds for now, can consider meds if persistently elevated despite taper    #Anxiety- will hold off on Klonopin PRN as pt will be on librium taper, iSTOP reference #[id number] show pt received Klonopin     #Prophylactic measure- lovenox

## 2018-12-26 NOTE — ED PROVIDER NOTE - OBJECTIVE STATEMENT
· HPI Objective Statement: daily x 6 years), presenting with desire for detox. Pt went to walk in clinic today due to falling 3.5 days ago; denies head injury/ LOC. Pt reports RUQ pain 2 weeks; intermittent;  pt denies any alleviating or aggravating fractures.  Last drink this morning - small ? shot of wine.  Pt states she was previously drinking wine but now added liquor.  Pt reports drinking 2 over sized bottles of wine/ daily.  pt taking 2 mini bottles of vodka daily.  Pt reports prior klonopin use for panic disorder, but use occasional and last use several days ago.   Pt reports recent detox at [facility] 1 year ago. No prior history of seizures.  Not on any other medications and denies any drug use. Pt reports headache, perioral numbness, anxious and sweaty. Pt was here recently and did not want to stay so was sent home w/ librium but states it did not help

## 2018-12-26 NOTE — H&P ADULT - NSHPSOCIALHISTORY_GEN_ALL_CORE
, has kids, unemployed, daily alcohol use with 2 large bottles of wine daily for 3 years with addition of 3-4oz of vodka , has kids, unemployed, daily alcohol use with 2 large bottles of wine daily for 3 years with addition of 3-4oz of vodka  Denies tobacco and illicit drug use

## 2018-12-26 NOTE — ED ADULT NURSE REASSESSMENT NOTE - NS ED NURSE REASSESS COMMENT FT1
Report given to accepting holding room RN, Carlyle, pt place don transfer cardiac monitor at this time and safely transferred to holding unit.  Safety maintained.

## 2018-12-27 DIAGNOSIS — F10.230 ALCOHOL DEPENDENCE WITH WITHDRAWAL, UNCOMPLICATED: ICD-10-CM

## 2018-12-27 DIAGNOSIS — F43.29 ADJUSTMENT DISORDER WITH OTHER SYMPTOMS: ICD-10-CM

## 2018-12-27 LAB
ALBUMIN SERPL ELPH-MCNC: 3.9 G/DL — SIGNIFICANT CHANGE UP (ref 3.3–5.2)
ALP SERPL-CCNC: 163 U/L — HIGH (ref 40–120)
ALT FLD-CCNC: 59 U/L — HIGH
ANION GAP SERPL CALC-SCNC: 12 MMOL/L — SIGNIFICANT CHANGE UP (ref 5–17)
AST SERPL-CCNC: 107 U/L — HIGH
BILIRUB SERPL-MCNC: 0.5 MG/DL — SIGNIFICANT CHANGE UP (ref 0.4–2)
BUN SERPL-MCNC: 14 MG/DL — SIGNIFICANT CHANGE UP (ref 8–20)
CALCIUM SERPL-MCNC: 8.8 MG/DL — SIGNIFICANT CHANGE UP (ref 8.6–10.2)
CHLORIDE SERPL-SCNC: 102 MMOL/L — SIGNIFICANT CHANGE UP (ref 98–107)
CO2 SERPL-SCNC: 24 MMOL/L — SIGNIFICANT CHANGE UP (ref 22–29)
CREAT SERPL-MCNC: 0.52 MG/DL — SIGNIFICANT CHANGE UP (ref 0.5–1.3)
CULTURE RESULTS: NO GROWTH — SIGNIFICANT CHANGE UP
GLUCOSE SERPL-MCNC: 110 MG/DL — SIGNIFICANT CHANGE UP (ref 70–115)
HAV IGM SER-ACNC: SIGNIFICANT CHANGE UP
HBV CORE IGM SER-ACNC: SIGNIFICANT CHANGE UP
HBV SURFACE AG SER-ACNC: SIGNIFICANT CHANGE UP
HCV AB S/CO SERPL IA: 0.11 S/CO — SIGNIFICANT CHANGE UP
HCV AB SERPL-IMP: SIGNIFICANT CHANGE UP
POTASSIUM SERPL-MCNC: 3.8 MMOL/L — SIGNIFICANT CHANGE UP (ref 3.5–5.3)
POTASSIUM SERPL-SCNC: 3.8 MMOL/L — SIGNIFICANT CHANGE UP (ref 3.5–5.3)
PROT SERPL-MCNC: 6.7 G/DL — SIGNIFICANT CHANGE UP (ref 6.6–8.7)
SODIUM SERPL-SCNC: 138 MMOL/L — SIGNIFICANT CHANGE UP (ref 135–145)
SPECIMEN SOURCE: SIGNIFICANT CHANGE UP

## 2018-12-27 PROCEDURE — 99232 SBSQ HOSP IP/OBS MODERATE 35: CPT

## 2018-12-27 PROCEDURE — 99222 1ST HOSP IP/OBS MODERATE 55: CPT

## 2018-12-27 RX ORDER — ONDANSETRON 8 MG/1
4 TABLET, FILM COATED ORAL EVERY 6 HOURS
Qty: 0 | Refills: 0 | Status: DISCONTINUED | OUTPATIENT
Start: 2018-12-27 | End: 2018-12-30

## 2018-12-27 RX ADMIN — Medication 2 MILLIGRAM(S): at 02:09

## 2018-12-27 RX ADMIN — Medication 1 TABLET(S): at 11:32

## 2018-12-27 RX ADMIN — Medication 1 MILLIGRAM(S): at 11:32

## 2018-12-27 RX ADMIN — Medication 2 MILLIGRAM(S): at 23:08

## 2018-12-27 RX ADMIN — Medication 2 MILLIGRAM(S): at 14:32

## 2018-12-27 RX ADMIN — Medication 2 MILLIGRAM(S): at 19:56

## 2018-12-27 RX ADMIN — Medication 50 MILLIGRAM(S): at 11:32

## 2018-12-27 RX ADMIN — Medication 50 MILLIGRAM(S): at 21:13

## 2018-12-27 RX ADMIN — Medication 100 MILLIGRAM(S): at 11:32

## 2018-12-27 RX ADMIN — Medication 50 MILLIGRAM(S): at 05:54

## 2018-12-27 RX ADMIN — ONDANSETRON 4 MILLIGRAM(S): 8 TABLET, FILM COATED ORAL at 18:43

## 2018-12-27 NOTE — BEHAVIORAL HEALTH ASSESSMENT NOTE - NSBHCONSULTFOLLOWAFTERCARE_PSY_A_CORE FT
community resources i.e. AA  mental health counselling with re-evaluation of medication for anxiety as outpatient

## 2018-12-27 NOTE — BEHAVIORAL HEALTH ASSESSMENT NOTE - NSBHCHARTREVIEWIMAGING_PSY_A_CORE FT
< from: US Abdomen Limited (12.26.18 @ 18:27) >    IMPRESSION:   Fatty infiltration of liver otherwise Unremarkable right upper quadrant   ultrasonography.    < end of copied text >
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
## 2018-12-27 NOTE — BEHAVIORAL HEALTH ASSESSMENT NOTE - OTHER PAST PSYCHIATRIC HISTORY (INCLUDE DETAILS REGARDING ONSET, COURSE OF ILLNESS, INPATIENT/OUTPATIENT TREATMENT)
briefly in therapy after divorce began use of benzodiazepines at that time  no recent treatment No admissions
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
## 2018-12-27 NOTE — BEHAVIORAL HEALTH ASSESSMENT NOTE - NSBHCHARTREVIEWLAB_PSY_A_CORE FT
14.9   6.6   )-----------( 215      ( 26 Dec 2018 16:00 )             44.3         138  |  102  |  14.0  ----------------------------<  110  3.8   |  24.0  |  0.52    Ca    8.8      27 Dec 2018 06:38  Mg     2.1         TPro  6.7  /  Alb  3.9  /  TBili  0.5  /  DBili  x   /  AST  107<H>  /  ALT  59<H>  /  AlkPhos  163<H>      LIVER FUNCTIONS - ( 27 Dec 2018 06:38 )  Alb: 3.9 g/dL / Pro: 6.7 g/dL / ALK PHOS: 163 U/L / ALT: 59 U/L / AST: 107 U/L / GGT: x           PT/INR - ( 26 Dec 2018 16:00 )   PT: 10.8 sec;   INR: 0.94 ratio         PTT - ( 26 Dec 2018 16:00 )  PTT:33.4 sec  Urinalysis Basic - ( 26 Dec 2018 18:42 )    Color: Gudelia / Appearance: Clear / S.025 / pH: x  Gluc: x / Ketone: Moderate  / Bili: Small / Urobili: 1 mg/dL   Blood: x / Protein: 30 mg/dL / Nitrite: Negative   Leuk Esterase: Trace / RBC: 0-2 /HPF / WBC 0-2   Sq Epi: x / Non Sq Epi: Moderate / Bacteria: Few

## 2018-12-27 NOTE — BEHAVIORAL HEALTH ASSESSMENT NOTE - NSBHCHARTREVIEWINVESTIGATE_PSY_A_CORE FT
< from: 12 Lead ECG (12.26.18 @ 16:05) >    Diagnosis Line Normal sinus rhythm  Low voltage QRS  Borderline ECG    < end of copied text >

## 2018-12-27 NOTE — BEHAVIORAL HEALTH ASSESSMENT NOTE - NSBHCHARTREVIEWVS_PSY_A_CORE FT
Vital Signs Last 24 Hrs  T(C): 36.7 (27 Dec 2018 04:41), Max: 37.4 (26 Dec 2018 18:57)  T(F): 98 (27 Dec 2018 04:41), Max: 99.3 (26 Dec 2018 18:57)  HR: 83 (27 Dec 2018 04:41) (83 - 118)  BP: 102/71 (27 Dec 2018 04:41) (102/71 - 162/112)  BP(mean): 106 (26 Dec 2018 18:57) (106 - 106)  RR: 18 (27 Dec 2018 04:41) (16 - 18)  SpO2: 98% (27 Dec 2018 04:41) (96% - 100%)

## 2018-12-27 NOTE — BEHAVIORAL HEALTH ASSESSMENT NOTE - NSBHCONSULTRECOMMENDOTHER_PSY_A_CORE FT
would defer starting any SSRI medications during acute withdrawal phase Past history of seizure reported by patient Now withdrawing from clonazepam and alcohol also complicated by hepatic transaminase elevations

## 2018-12-27 NOTE — BEHAVIORAL HEALTH ASSESSMENT NOTE - DESCRIPTION
ESOPHAGOGASTRODUODENOSCOPY    PROCEDURE: EGD/ esophageal stent placement    INDICATIONS: obstructing esophageal tumor    POST-OP DIAGNOSIS: See the impression below    SEDATION: Monitored anesthesia care, check anesthesia records    PHYSICAL EXAM:    Vitals:    01/11/18 1215   BP: 146/65   Pulse: 69   Resp: 20   Temp: 98 9 °F (37 2 °C)   SpO2: 97%    There is no height or weight on file to calculate BMI  General: NAD  Heart: S1 & S2 normal, RRR  Lungs: CTA, No rales or rhonchi  Abdomen: Soft, nontender, nondistended, good bowel sounds    CONSENT:  Informed consent was obtained for the procedure, including sedation after explaining the risks and benefits of the procedure  Risks including but not limited to bleeding, perforation, infection, aspiration were discussed in detail  Also explained about less than 100% sensitivity with the exam and other alternatives  PREPARATION:   EKG tracing, pulse oximetry, blood pressure were monitored throughout the procedure  Patient was identified by myself both verbally and by visual inspection of ID band  DESCRIPTION:   Patient was placed in the left lateral decubitus position and was sedated with the above medication  The gastroscope was introduced in to the oropharynx and the esophagus was intubated under direct visualization  Scope was passed down the esophagus up to 2nd part of the duodenum  A careful inspection was made as the gastroscope was withdrawn, including a retroflexed view of the stomach; findings and interventions are described below       FINDINGS:    #1  Esophagus and GEJ- friable ulcerated mass extending through the distal esophagus into the GE junction, very tight, using gentle pressure I was able to advance the scope beyond this  The stomach was unremarkable  Retroflexion demonstrated normal cardia  A hemoclip was placed at the GE junction to miguel the GE junction  A guidewire was advanced, over the guidewire a 23 mm x 120 mm fully covered [employer] esophageal stent was deployed, using fluoroscopic guidance with successful placement  Endoscope was readvanced into this esophagus to confirm positioning    #2  Stomach- normal    #3  Duodenum- normal         IMPRESSIONS:      Successful placement of a 23 mm x 120 mm fully covered [employer] stent through a nearly obstructing tumor in the distal esophagus which was quite friable  Using guidewire, fluoroscopic guidance stent was successfully placed in position was confirmed on fluoroscopy and direct visualization with endoscopy    RECOMMENDATIONS:     Discharge home  Clear liquid diet today  Twice daily PPI therapy, Phenergan as needed  Full liquid diet tomorrow  There after soft foods only such as ground meats, avoid all breads, no hard vegetables or fruits  Follow up with radiation oncologist    COMPLICATIONS:  None; patient tolerated the procedure well            DISPOSITION: PACU           CONDITION: Stable none reported

## 2018-12-28 LAB
ALBUMIN SERPL ELPH-MCNC: 3.9 G/DL — SIGNIFICANT CHANGE UP (ref 3.3–5.2)
ALP SERPL-CCNC: 149 U/L — HIGH (ref 40–120)
ALT FLD-CCNC: 60 U/L — HIGH
ANION GAP SERPL CALC-SCNC: 14 MMOL/L — SIGNIFICANT CHANGE UP (ref 5–17)
AST SERPL-CCNC: 91 U/L — HIGH
BILIRUB SERPL-MCNC: 0.3 MG/DL — LOW (ref 0.4–2)
BUN SERPL-MCNC: 12 MG/DL — SIGNIFICANT CHANGE UP (ref 8–20)
CALCIUM SERPL-MCNC: 9 MG/DL — SIGNIFICANT CHANGE UP (ref 8.6–10.2)
CHLORIDE SERPL-SCNC: 103 MMOL/L — SIGNIFICANT CHANGE UP (ref 98–107)
CO2 SERPL-SCNC: 22 MMOL/L — SIGNIFICANT CHANGE UP (ref 22–29)
CREAT SERPL-MCNC: 0.5 MG/DL — SIGNIFICANT CHANGE UP (ref 0.5–1.3)
GLUCOSE SERPL-MCNC: 97 MG/DL — SIGNIFICANT CHANGE UP (ref 70–115)
MAGNESIUM SERPL-MCNC: 2.1 MG/DL — SIGNIFICANT CHANGE UP (ref 1.6–2.6)
POTASSIUM SERPL-MCNC: 3.8 MMOL/L — SIGNIFICANT CHANGE UP (ref 3.5–5.3)
POTASSIUM SERPL-SCNC: 3.8 MMOL/L — SIGNIFICANT CHANGE UP (ref 3.5–5.3)
PROT SERPL-MCNC: 6.7 G/DL — SIGNIFICANT CHANGE UP (ref 6.6–8.7)
SODIUM SERPL-SCNC: 139 MMOL/L — SIGNIFICANT CHANGE UP (ref 135–145)

## 2018-12-28 PROCEDURE — 99232 SBSQ HOSP IP/OBS MODERATE 35: CPT

## 2018-12-28 RX ORDER — ACETAMINOPHEN 500 MG
650 TABLET ORAL EVERY 6 HOURS
Qty: 0 | Refills: 0 | Status: DISCONTINUED | OUTPATIENT
Start: 2018-12-28 | End: 2018-12-29

## 2018-12-28 RX ADMIN — Medication 2 MILLIGRAM(S): at 22:57

## 2018-12-28 RX ADMIN — Medication 2 MILLIGRAM(S): at 09:54

## 2018-12-28 RX ADMIN — Medication 50 MILLIGRAM(S): at 06:23

## 2018-12-28 RX ADMIN — Medication 100 MILLIGRAM(S): at 13:31

## 2018-12-28 RX ADMIN — Medication 1 MILLIGRAM(S): at 13:31

## 2018-12-28 RX ADMIN — Medication 2 MILLIGRAM(S): at 15:00

## 2018-12-28 RX ADMIN — Medication 1 TABLET(S): at 13:31

## 2018-12-28 RX ADMIN — Medication 650 MILLIGRAM(S): at 14:59

## 2018-12-28 RX ADMIN — Medication 50 MILLIGRAM(S): at 13:31

## 2018-12-28 RX ADMIN — Medication 650 MILLIGRAM(S): at 15:48

## 2018-12-28 RX ADMIN — Medication 2 MILLIGRAM(S): at 19:58

## 2018-12-29 PROCEDURE — 99233 SBSQ HOSP IP/OBS HIGH 50: CPT

## 2018-12-29 RX ORDER — CLONAZEPAM 1 MG
1 TABLET ORAL THREE TIMES A DAY
Qty: 0 | Refills: 0 | Status: DISCONTINUED | OUTPATIENT
Start: 2018-12-29 | End: 2018-12-30

## 2018-12-29 RX ADMIN — Medication 1 MILLIGRAM(S): at 15:19

## 2018-12-29 RX ADMIN — Medication 50 MILLIGRAM(S): at 06:41

## 2018-12-29 RX ADMIN — Medication 50 MILLIGRAM(S): at 13:09

## 2018-12-29 RX ADMIN — Medication 1 MILLIGRAM(S): at 13:09

## 2018-12-29 RX ADMIN — Medication 1 TABLET(S): at 13:09

## 2018-12-29 RX ADMIN — Medication 50 MILLIGRAM(S): at 21:11

## 2018-12-29 RX ADMIN — Medication 100 MILLIGRAM(S): at 13:09

## 2018-12-29 RX ADMIN — Medication 1 MILLIGRAM(S): at 21:11

## 2018-12-29 NOTE — CHART NOTE - NSCHARTNOTEFT_GEN_A_CORE
54y/o female admitted for Alcohol abuse and withdrawal symptoms. RN called for PA to evaluate s/p fall. RN states patient came out of her room and told the nurse that she fell while trying to get out of bed. Patient states her foot got caught in her sheet while trying to get out of the bed. She states she skid across the floor causing injury to both knees and twisting her back. Now complaining of right lower back pain. Does not want any pain medication at this time.  VSS: BP: 120/82, HR: 76 R: 18   Legs: Right: 3 small dime size abrasions noted: 1 on her knee, 1 just below her knee, and one mid-calf. No active bleeding noted.           Left: 1 small dime size abrasion noted on knee, no active bleeding noted           FROM both ext. without pain           NL gait  Back: no edema or ecchymosis noted           tenderness noted with palpation over right para sternal muscles           FROM  A/P: S/P Fall              -Abrasions                   - Clean areas with saline and apply bacitracin ointment with gauze dressing                - Lower back strain                   - observe at this time                   - call PA as needed

## 2018-12-30 ENCOUNTER — TRANSCRIPTION ENCOUNTER (OUTPATIENT)
Age: 53
End: 2018-12-30

## 2018-12-30 VITALS
RESPIRATION RATE: 18 BRPM | HEART RATE: 73 BPM | OXYGEN SATURATION: 96 % | DIASTOLIC BLOOD PRESSURE: 73 MMHG | TEMPERATURE: 99 F | SYSTOLIC BLOOD PRESSURE: 103 MMHG

## 2018-12-30 PROCEDURE — 99239 HOSP IP/OBS DSCHRG MGMT >30: CPT

## 2018-12-30 RX ORDER — CLONAZEPAM 1 MG
1 TABLET ORAL
Qty: 0 | Refills: 0 | COMMUNITY

## 2018-12-30 RX ORDER — CLONAZEPAM 1 MG
1 TABLET ORAL
Qty: 9 | Refills: 0 | OUTPATIENT
Start: 2018-12-30 | End: 2019-01-01

## 2018-12-30 RX ADMIN — Medication 1 MILLIGRAM(S): at 13:20

## 2018-12-30 RX ADMIN — ONDANSETRON 4 MILLIGRAM(S): 8 TABLET, FILM COATED ORAL at 10:52

## 2018-12-30 RX ADMIN — Medication 50 MILLIGRAM(S): at 05:45

## 2018-12-30 RX ADMIN — Medication 1 MILLIGRAM(S): at 08:35

## 2018-12-30 RX ADMIN — Medication 1 TABLET(S): at 13:20

## 2018-12-30 RX ADMIN — Medication 1 MILLIGRAM(S): at 13:22

## 2018-12-30 RX ADMIN — Medication 50 MILLIGRAM(S): at 13:20

## 2018-12-30 NOTE — DISCHARGE NOTE ADULT - HOSPITAL COURSE
Admitted with Alc withdrawal. now resolved. She wants to detox from Alc and Klonopin. underlying anxiety noted. recommend [facility] outpatient psychiatry services (her insurance is accepted there). she is requesting for klonopin scripts for few days at the time od discharge as she does not ahve any doctors in the community.     Time 35 mins

## 2018-12-30 NOTE — PROGRESS NOTE ADULT - SUBJECTIVE AND OBJECTIVE BOX
HEALTH ISSUES - PROBLEM Dx:    CC- alcohol/ klonopin dependency; seeking detox; underlying psych disorders    INTERVAL HPI/ OVERNIGHT EVENTS:    overnight mechanical fall noted  c/o low back pain but not requiring pain meds per pt    REVIEW OF SYSTEMS:    anxiety + essential tremors + agitation + inability ot focus +    Vital Signs Last 24 Hrs  T(C): 37.1 (30 Dec 2018 08:00), Max: 37.1 (30 Dec 2018 08:00)  T(F): 98.8 (30 Dec 2018 08:00), Max: 98.8 (30 Dec 2018 08:00)  HR: 73 (30 Dec 2018 08:00) (73 - 76)  BP: 103/73 (30 Dec 2018 08:00) (103/71 - 112/83)  BP(mean): --  RR: 18 (30 Dec 2018 08:00) (18 - 18)  SpO2: 96% (30 Dec 2018 08:00) (96% - 99%)    PHYSICAL EXAM-  GENERAL: well-groomed, well-developed  HEAD:  Atraumatic, Normocephalic  EYES: EOMI, conjunctiva and sclera clear  ENMT: Moist mucous membranes, No lesions  NECK: Supple, No JVD, Normal thyroid  NERVOUS SYSTEM:  Alert & Oriented X 3, Moving all extremities has peripheral essential intentional tremors mostly right UE than left  CHEST/LUNG: Clear to auscultate bilaterally; No rales, rhonchi, wheezing, or rubs  HEART: Regular rate and rhythm; No murmurs, rubs, or gallops  ABDOMEN: Soft, Nontender, Nondistended; Bowel sounds present  EXTREMITIES:  2+ Peripheral Pulses, No clubbing, cyanosis, or edema  LYMPH: No lymphadenopathy noted  SKIN: No rashes or lesions    LABS:    12-28    139  |  103  |  12.0  ----------------------------<  97  3.8   |  22.0  |  0.50    Ca    9.0      28 Dec 2018 10:02  Mg     2.1     12-28    TPro  6.7  /  Alb  3.9  /  TBili  0.3<L>  /  DBili  x   /  AST  91<H>  /  ALT  60<H>  /  AlkPhos  149<H>  12-28    Assessment and Plan
SUDHEER DELEON    [id number]    53y      Female    CC: alcohol withdrawel    INTERVAL HPI/OVERNIGHT EVENTS:  53yoF hx alcohol abuse, HTN not on meds, anxiety, sent from urgent care for elevated LFTs and presenting with desire for alcohol detox.  Pt has been drinking 2 bottles of wine daily since  and over the past several weeks has starting using 3-4oz of vodka daily in addition to the wine.  She reports that about 1 week ago she attempted to abstain from alcohol for about 4 days and developed tremors, confusion, diaphoresis and due to her symptoms she had to start consuming alcohol again.  Pt does not follow with a regular PMD.  She typically goes to urgent care center where she gets Klonopin prescribed for anxiety and they advised her to go to ED for further eval due to elevated LFTs.  Pt say she also became very emotional after spending [place] alone without family and this is motivating her to become sober.  Her last drink was in the early AM of .  Pt endorsing intermittent, non-radiating crampy RUQ abdominal pain for the past several weeks.  In ED, pt tachycardia and hypertensive.  Labs remarkable for alcohol level <10 and elevated LFTs (, ALT 80).  RUQ US showed fatty infiltration of liver.       today pt states she still feels shakey no new events, no n/v min right quadrant abd discomfort. no chest pain no sob    REVIEW OF SYSTEMS:    CONSTITUTIONAL: No fever, weight loss, or fatigue  RESPIRATORY: No cough, wheezing, hemoptysis; No shortness of breath  CARDIOVASCULAR: No chest pain, palpitations  GASTROINTESTINAL: min right quadrante abdominal discomfort, No nausea, vomiting        Vital Signs Last 24 Hrs  T(C): 36.8 (28 Dec 2018 00:22), Max: 37.2 (27 Dec 2018 15:08)  T(F): 98.3 (28 Dec 2018 00:22), Max: 98.9 (27 Dec 2018 15:08)  HR: 81 (28 Dec 2018 00:22) (79 - 81)  BP: 100/70 (28 Dec 2018 00:22) (100/70 - 115/78)  BP(mean): --  RR: 16 (28 Dec 2018 00:22) (16 - 16)  SpO2: 95% (28 Dec 2018 00:22) (95% - 98%)    PHYSICAL EXAM:    GENERAL: NAD, well-groomed  HEENT: PERRL, +EOMI  CHEST/LUNG: Clear to auscultation bilaterally; No wheezing  HEART: S1S2+, Regular rate and rhythm; No murmurs  ABDOMEN: Soft, Nontender, Nondistended; Bowel sounds present  EXTREMITIES:  2+ Peripheral Pulses, No clubbing, cyanosis, or edema        LABS:                        14.9   6.6   )-----------( 215      ( 26 Dec 2018 16:00 )             44.3         138  |  102  |  14.0  ----------------------------<  110  3.8   |  24.0  |  0.52    Ca    8.8      27 Dec 2018 06:38  Mg     2.1         TPro  6.7  /  Alb  3.9  /  TBili  0.5  /  DBili  x   /  AST  107<H>  /  ALT  59<H>  /  AlkPhos  163<H>      PT/INR - ( 26 Dec 2018 16:00 )   PT: 10.8 sec;   INR: 0.94 ratio         PTT - ( 26 Dec 2018 16:00 )  PTT:33.4 sec  Urinalysis Basic - ( 26 Dec 2018 18:42 )    Color: Gudelia / Appearance: Clear / S.025 / pH: x  Gluc: x / Ketone: Moderate  / Bili: Small / Urobili: 1 mg/dL   Blood: x / Protein: 30 mg/dL / Nitrite: Negative   Leuk Esterase: Trace / RBC: 0-2 /HPF / WBC 0-2   Sq Epi: x / Non Sq Epi: Moderate / Bacteria: Few          MEDICATIONS  (STANDING):  chlordiazePOXIDE 50 milliGRAM(s) Oral every 8 hours  chlordiazePOXIDE   Oral   enoxaparin Injectable 40 milliGRAM(s) SubCutaneous daily  folic acid 1 milliGRAM(s) Oral daily  multivitamin 1 Tablet(s) Oral daily  thiamine 100 milliGRAM(s) Oral daily    MEDICATIONS  (PRN):  LORazepam     Tablet 2 milliGRAM(s) Oral every 2 hours PRN Symptom-triggered 2 point increase in CIWA-Ar  LORazepam   Injectable 2 milliGRAM(s) IV Push every 1 hour PRN Symptom-triggered: each CIWA -Ar score 8 or GREATER  ondansetron Injectable 4 milliGRAM(s) IV Push every 6 hours PRN Nausea and/or Vomiting
SUDHEER DELEON    [id number]    53y      Female    CC: alcohol withdrawel    INTERVAL HPI/OVERNIGHT EVENTS:  53yoF hx alcohol abuse, HTN not on meds, anxiety, sent from urgent care for elevated LFTs and presenting with desire for alcohol detox.  Pt has been drinking 2 bottles of wine daily since  and over the past several weeks has starting using 3-4oz of vodka daily in addition to the wine.  She reports that about 1 week ago she attempted to abstain from alcohol for about 4 days and developed tremors, confusion, diaphoresis and due to her symptoms she had to start consuming alcohol again.  Pt does not follow with a regular PMD.  She typically goes to urgent care center where she gets Klonopin prescribed for anxiety and they advised her to go to ED for further eval due to elevated LFTs.  Pt say she also became very emotional after spending [place] alone without family and this is motivating her to become sober.  Her last drink was in the early AM of .  Pt endorsing intermittent, non-radiating crampy RUQ abdominal pain for the past several weeks.  In ED, pt tachycardia and hypertensive.  Labs remarkable for alcohol level <10 and elevated LFTs (, ALT 80).  RUQ US showed fatty infiltration of liver.       today pt feels shakey no new events, no n/v min right quadrant abd discomfort. no chest pain no sob    REVIEW OF SYSTEMS:    CONSTITUTIONAL: No fever, weight loss, or fatigue  RESPIRATORY: No cough, wheezing, hemoptysis; No shortness of breath  CARDIOVASCULAR: No chest pain, palpitations  GASTROINTESTINAL: min right quadrante abdominal discomfort, No nausea, vomiting        Vital Signs Last 24 Hrs  T(C): 36.7 (27 Dec 2018 04:41), Max: 37.4 (26 Dec 2018 18:57)  T(F): 98 (27 Dec 2018 04:41), Max: 99.3 (26 Dec 2018 18:57)  HR: 83 (27 Dec 2018 04:41) (83 - 118)  BP: 102/71 (27 Dec 2018 04:41) (102/71 - 162/112)  BP(mean): 106 (26 Dec 2018 18:57) (106 - 106)  RR: 18 (27 Dec 2018 04:41) (16 - 18)  SpO2: 98% (27 Dec 2018 04:41) (96% - 100%)    PHYSICAL EXAM:    GENERAL: NAD, well-groomed  HEENT: PERRL, +EOMI  CHEST/LUNG: Clear to auscultation bilaterally; No wheezing  HEART: S1S2+, Regular rate and rhythm; No murmurs  ABDOMEN: Soft, Nontender, Nondistended; Bowel sounds present  EXTREMITIES:  2+ Peripheral Pulses, No clubbing, cyanosis, or edema      LABS:                        14.9   6.6   )-----------( 215      ( 26 Dec 2018 16:00 )             44.3         138  |  102  |  14.0  ----------------------------<  110  3.8   |  24.0  |  0.52    Ca    8.8      27 Dec 2018 06:38  Mg     2.1         TPro  6.7  /  Alb  3.9  /  TBili  0.5  /  DBili  x   /  AST  107<H>  /  ALT  59<H>  /  AlkPhos  163<H>      PT/INR - ( 26 Dec 2018 16:00 )   PT: 10.8 sec;   INR: 0.94 ratio         PTT - ( 26 Dec 2018 16:00 )  PTT:33.4 sec  Urinalysis Basic - ( 26 Dec 2018 18:42 )    Color: Gudelia / Appearance: Clear / S.025 / pH: x  Gluc: x / Ketone: Moderate  / Bili: Small / Urobili: 1 mg/dL   Blood: x / Protein: 30 mg/dL / Nitrite: Negative   Leuk Esterase: Trace / RBC: 0-2 /HPF / WBC 0-2   Sq Epi: x / Non Sq Epi: Moderate / Bacteria: Few          MEDICATIONS  (STANDING):  chlordiazePOXIDE 50 milliGRAM(s) Oral every 8 hours  chlordiazePOXIDE   Oral   enoxaparin Injectable 40 milliGRAM(s) SubCutaneous daily  folic acid 1 milliGRAM(s) Oral daily  multivitamin 1 Tablet(s) Oral daily  thiamine 100 milliGRAM(s) Oral daily    MEDICATIONS  (PRN):  LORazepam     Tablet 2 milliGRAM(s) Oral every 2 hours PRN Symptom-triggered 2 point increase in CIWA-Ar  LORazepam   Injectable 2 milliGRAM(s) IV Push every 1 hour PRN Symptom-triggered: each CIWA -Ar score 8 or GREATER      RADIOLOGY & ADDITIONAL TESTS:  Abd u/s:    FINDINGS:   There is diffuse fatty infiltration of liver parenchyma.      There are no liver masses.    There is no intrahepatic biliary ductal dilatation.  The portal vein shows hepatopedal flow.    The gallbladder  is within normal limits.    There is no gallbladder wall thickening.   The common duct measures 1.7 mm in maximum diameter.    The patient is taking pain medication. Norton's sign not applicable.        The pancreatic head, neck, and proximal body are intact. The midbody and   tail of pancreas are obscured by bowel gas.     The right kidney measures 9.9 cm in length and shows normal parenchymal   echogenicity, without stones or hydronephrosis.    Visualized segments of abdominal aorta are within normal limits by   sonographic criteria. IVC is unremarkable. No retroperitoneal mass seen.    IMPRESSION:   Fatty infiltration of liver otherwise Unremarkable right upper quadrant   ultrasonography.      LUKE IGLESIAS M.D., ATTENDING RADIOLOGIST  This document has been electronically signed. Dec 26 2018  7:20PM
HEALTH ISSUES - PROBLEM Dx:    CC- alcohol/ klonopin dependency; seeking detox; underlying psych disorders    INTERVAL HPI/ OVERNIGHT EVENTS:    pt was getting librium and ativan and she is insisting on continuing ativan  believes this is how she will get off from klonopin and alcohol  is not happy that librium is being tapered and does not want to go to inpatient psych center for detox    REVIEW OF SYSTEMS:    anxiety + essential tremors + agitation + inability ot focus +    Vital Signs Last 24 Hrs  T(C): 36.9 (29 Dec 2018 15:48), Max: 37.1 (29 Dec 2018 00:45)  T(F): 98.5 (29 Dec 2018 15:48), Max: 98.8 (29 Dec 2018 08:01)  HR: 74 (29 Dec 2018 15:48) (61 - 81)  BP: 103/71 (29 Dec 2018 15:48) (103/71 - 122/72)  BP(mean): --  RR: 18 (29 Dec 2018 15:48) (18 - 19)  SpO2: 98% (29 Dec 2018 15:48) (95% - 98%)    PHYSICAL EXAM-  GENERAL: well-groomed, well-developed  HEAD:  Atraumatic, Normocephalic  EYES: EOMI, conjunctiva and sclera clear  ENMT: Moist mucous membranes, No lesions  NECK: Supple, No JVD, Normal thyroid  NERVOUS SYSTEM:  Alert & Oriented X 3, Moving all extremities has peripheral essential intentional tremors mostly right UE than left  CHEST/LUNG: Clear to auscultate bilaterally; No rales, rhonchi, wheezing, or rubs  HEART: Regular rate and rhythm; No murmurs, rubs, or gallops  ABDOMEN: Soft, Nontender, Nondistended; Bowel sounds present  EXTREMITIES:  2+ Peripheral Pulses, No clubbing, cyanosis, or edema  LYMPH: No lymphadenopathy noted  SKIN: No rashes or lesions    LABS:    12-28    139  |  103  |  12.0  ----------------------------<  97  3.8   |  22.0  |  0.50    Ca    9.0      28 Dec 2018 10:02  Mg     2.1     12-28    TPro  6.7  /  Alb  3.9  /  TBili  0.3<L>  /  DBili  x   /  AST  91<H>  /  ALT  60<H>  /  AlkPhos  149<H>  12-28    Assessment and Plan

## 2018-12-30 NOTE — DISCHARGE NOTE ADULT - PATIENT PORTAL LINK FT
You can access the [facility], offered by [facility], by registering with the following website: [email or web address]

## 2018-12-30 NOTE — DISCHARGE NOTE ADULT - MEDICATION SUMMARY - MEDICATIONS TO CHANGE
I will SWITCH the dose or number of times a day I take the medications listed below when I get home from the hospital:    clonazePAM 1 mg oral tablet  -- 1 tab(s) by mouth 2 times a day, As Needed

## 2018-12-30 NOTE — DISCHARGE NOTE ADULT - PROVIDER TOKENS
FREE:[LAST:[PMD],PHONE:[(   )    -],FAX:[(   )    -]],FREE:[LAST:[Psych],PHONE:[(   )    -],FAX:[(   )    -]]

## 2018-12-30 NOTE — DISCHARGE NOTE ADULT - MEDICATION SUMMARY - MEDICATIONS TO TAKE
I will START or STAY ON the medications listed below when I get home from the hospital:    clonazePAM 1 mg oral tablet  -- 1 tab(s) by mouth 3 times a day, As needed, anxiety MDD:3  -- Indication: For Anxiety    Multiple Vitamins oral tablet  -- 1 tab(s) by mouth once a day  -- Indication: For Alcohol dependence

## 2018-12-30 NOTE — DISCHARGE NOTE ADULT - MEDICATION SUMMARY - MEDICATIONS TO STOP TAKING
I will STOP taking the medications listed below when I get home from the hospital:    chlordiazePOXIDE 25 mg oral capsule  -- Take 2 pills 4 times on day 1, 2 pills 3 times a day on day 2, 2 pills 2 times a day on day 3, 2 pills at bedtime on day 4 MDD:8  -- Caution federal law prohibits the transfer of this drug to any person other  than the person for whom it was prescribed.  May cause drowsiness.  Alcohol may intensify this effect.  Use care when operating dangerous machinery.    chlordiazePOXIDE 25 mg oral capsule  -- 2 cap(s) by mouth every 6 hours  on day 1, 2 tabs every 8 hours on day 2,  2 tabs every 12 hours on day 3, and 2 tabs at bedtime on day 4. MDD:8 tabs   -- Caution federal law prohibits the transfer of this drug to any person other  than the person for whom it was prescribed.  May cause drowsiness.  Alcohol may intensify this effect.  Use care when operating dangerous machinery.

## 2018-12-30 NOTE — DISCHARGE NOTE ADULT - CARE PLAN
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

## 2018-12-30 NOTE — PROGRESS NOTE ADULT - ASSESSMENT
53yoF hx alcohol abuse, HTN not on meds, anxiety, sent from urgent care for elevated LFTs and presenting with desire for alcohol detox.  Pt has been drinking 2 bottles of wine daily since 2015 and over the past several weeks has starting using 3-4oz of vodka daily in addition to the wine.  She reports that about 1 week ago she attempted to abstain from alcohol for about 4 days and developed tremors, confusion, diaphoresis and due to her symptoms she had to start consuming alcohol again.  Pt does not follow with a regular PMD.  She typically goes to urgent care center where she gets Klonopin prescribed for anxiety and they advised her to go to ED for further eval due to elevated LFTs.  Pt say she also became very emotional after spending [place] alone without family and this is motivating her to become sober.  Her last drink was in the early AM of 12/26.  Pt endorsing intermittent, non-radiating crampy RUQ abdominal pain for the past several weeks.  In ED, pt tachycardia and hypertensive.  Labs remarkable for alcohol level <10 and elevated LFTs (, ALT 80).  RUQ US showed fatty infiltration of liver.     1) Alcohol abuse and withdrawel  - states she has O/P de addiction person that she sees selena luciano in [place].  - doing well with Librium and Klonopin  - Thiamine and folate    2) Elevated LFTs in setting of alcoholism  - RUQ US shows fatty infiltration  - Hepatitis panel non reactive  - trending down LFTs    3) Psych disorder  - suspect underlying anxiety/ depression/ PTSD and more psych disorders  - psych eval appreciated  - outpt follow up     4) HTN: resolved   - hypertension in setting of alcohol withdrawal, will monitor off meds for now, can consider meds if persistently elevated despite taper    5) Prophylactic measure  - DVT ppx: lovenox    Plan- to taper to bid dose of librium on 12/31 and if stable, plan d/c
53yoF hx alcohol abuse, HTN not on meds, anxiety, sent from urgent care for elevated LFTs and presenting with desire for alcohol detox.  Pt has been drinking 2 bottles of wine daily since 2015 and over the past several weeks has starting using 3-4oz of vodka daily in addition to the wine.  She reports that about 1 week ago she attempted to abstain from alcohol for about 4 days and developed tremors, confusion, diaphoresis and due to her symptoms she had to start consuming alcohol again.  Pt does not follow with a regular PMD.  She typically goes to urgent care center where she gets Klonopin prescribed for anxiety and they advised her to go to ED for further eval due to elevated LFTs.  Pt say she also became very emotional after spending [place] alone without family and this is motivating her to become sober.  Her last drink was in the early AM of 12/26.  Pt endorsing intermittent, non-radiating crampy RUQ abdominal pain for the past several weeks.  In ED, pt tachycardia and hypertensive.  Labs remarkable for alcohol level <10 and elevated LFTs (, ALT 80).  RUQ US showed fatty infiltration of liver.     1) Alcohol abuse and withdrawel  - c/w CIWA protocol and librium taper  - Banana bag, multivitamins, electrolyte monitoring  - Seizure, aspiration precautions   - Social work consult    2) Elevated LFTs in setting of alcoholism  - RUQ US shows fatty infiltration  - Hepatitis panel pending  - trend LFTs    3) Depression  - psych eval appreciated  - outpt follow up     4) HTN  - hypertension in setting of alcohol withdrawal, will monitor off meds for now, can consider meds if persistently elevated despite taper    5) Prophylactic measure  - DVT ppx: lovenox
53yoF hx alcohol abuse, HTN not on meds, anxiety, sent from urgent care for elevated LFTs and presenting with desire for alcohol detox.  Pt has been drinking 2 bottles of wine daily since 2015 and over the past several weeks has starting using 3-4oz of vodka daily in addition to the wine.  She reports that about 1 week ago she attempted to abstain from alcohol for about 4 days and developed tremors, confusion, diaphoresis and due to her symptoms she had to start consuming alcohol again.  Pt does not follow with a regular PMD.  She typically goes to urgent care center where she gets Klonopin prescribed for anxiety and they advised her to go to ED for further eval due to elevated LFTs.  Pt say she also became very emotional after spending [place] alone without family and this is motivating her to become sober.  Her last drink was in the early AM of 12/26.  Pt endorsing intermittent, non-radiating crampy RUQ abdominal pain for the past several weeks.  In ED, pt tachycardia and hypertensive.  Labs remarkable for alcohol level <10 and elevated LFTs (, ALT 80).  RUQ US showed fatty infiltration of liver.     1) Alcohol abuse and withdrawel  - c/w CIWA protocol and librium taper  - s/p Banana bag, c/w multivitamins, electrolyte monitoring  - Seizure, aspiration precautions   - Social work consult  - am labs pending result    2) Elevated LFTs in setting of alcoholism  - RUQ US shows fatty infiltration  - Hepatitis panel non reactive  - trend LFTs    3) Depression  - psych eval appreciated  - outpt follow up     4) HTN: resolved   - hypertension in setting of alcohol withdrawal, will monitor off meds for now, can consider meds if persistently elevated despite taper    5) Prophylactic measure  - DVT ppx: lovenox    possible dc 12/29/18 if withdrawal sx controlled
53yoF hx alcohol abuse, HTN not on meds, anxiety, sent from urgent care for elevated LFTs and presenting with desire for alcohol detox.  Pt has been drinking 2 bottles of wine daily since 2015 and over the past several weeks has starting using 3-4oz of vodka daily in addition to the wine.  She reports that about 1 week ago she attempted to abstain from alcohol for about 4 days and developed tremors, confusion, diaphoresis and due to her symptoms she had to start consuming alcohol again.  Pt does not follow with a regular PMD.  She typically goes to urgent care center where she gets Klonopin prescribed for anxiety and they advised her to go to ED for further eval due to elevated LFTs.  Pt say she also became very emotional after spending [place] alone without family and this is motivating her to become sober.  Her last drink was in the early AM of 12/26.  Pt endorsing intermittent, non-radiating crampy RUQ abdominal pain for the past several weeks.  In ED, pt tachycardia and hypertensive.  Labs remarkable for alcohol level <10 and elevated LFTs (, ALT 80).  RUQ US showed fatty infiltration of liver.     1) Alcohol abuse and withdrawel  - states she has O/P de addiction person that she sees selena luciano in [place].  - believes that librium taper was too quick and if she goes home now she will go straight to her bottle of alcohol  - does not want to use klonopin and wants to use ativan  - after much discussion and emphasizing that acute care hospital is not a detox center, plan to a trial of tid librium x 2 days alogn with klonopin 1 mg tid PRN. She wants to try the same.   - reassess in 2-3 days and plan d/c  - Thiamine and folate    2) Elevated LFTs in setting of alcoholism  - RUQ US shows fatty infiltration  - Hepatitis panel non reactive  - trend down LFTs    3) Psych disorder  - suspect underlying anxiety/ depression/ PTSD and more psych disorders  - psych eval appreciated  - outpt follow up     4) HTN: resolved   - hypertension in setting of alcohol withdrawal, will monitor off meds for now, can consider meds if persistently elevated despite taper    5) Prophylactic measure  - DVT ppx: lovenox

## 2018-12-31 ENCOUNTER — EMERGENCY (EMERGENCY)
Facility: HOSPITAL | Age: 53
LOS: 0 days | Discharge: HOME | End: 2018-12-31
Attending: EMERGENCY MEDICINE | Admitting: EMERGENCY MEDICINE
Payer: MEDICAID

## 2018-12-31 VITALS
OXYGEN SATURATION: 95 % | DIASTOLIC BLOOD PRESSURE: 71 MMHG | HEART RATE: 89 BPM | RESPIRATION RATE: 18 BRPM | SYSTOLIC BLOOD PRESSURE: 169 MMHG | TEMPERATURE: 98 F | HEIGHT: 62 IN | WEIGHT: 119.05 LBS

## 2018-12-31 VITALS
SYSTOLIC BLOOD PRESSURE: 106 MMHG | TEMPERATURE: 98 F | DIASTOLIC BLOOD PRESSURE: 65 MMHG | HEART RATE: 88 BPM | OXYGEN SATURATION: 98 % | RESPIRATION RATE: 17 BRPM

## 2018-12-31 DIAGNOSIS — I10 ESSENTIAL (PRIMARY) HYPERTENSION: ICD-10-CM
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
## 2018-12-31 DIAGNOSIS — F10.929 ALCOHOL USE, UNSPECIFIED WITH INTOXICATION, UNSPECIFIED: ICD-10-CM

## 2018-12-31 DIAGNOSIS — Z83.3 FAMILY HISTORY OF DIABETES MELLITUS: ICD-10-CM

## 2018-12-31 DIAGNOSIS — F10.10 ALCOHOL ABUSE, UNCOMPLICATED: ICD-10-CM

## 2018-12-31 DIAGNOSIS — F41.9 ANXIETY DISORDER, UNSPECIFIED: ICD-10-CM

## 2018-12-31 DIAGNOSIS — Z86.79 PERSONAL HISTORY OF OTHER DISEASES OF THE CIRCULATORY SYSTEM: ICD-10-CM

## 2018-12-31 DIAGNOSIS — Z81.8 FAMILY HISTORY OF OTHER MENTAL AND BEHAVIORAL DISORDERS: ICD-10-CM

## 2018-12-31 LAB — ETHANOL SERPL-MCNC: 211 MG/DL — HIGH (ref 0–10)

## 2018-12-31 PROCEDURE — 99284 EMERGENCY DEPT VISIT MOD MDM: CPT | Mod: 25

## 2018-12-31 RX ADMIN — Medication 100 MILLIGRAM(S): at 04:55

## 2018-12-31 NOTE — ED ADULT NURSE NOTE - NSIMPLEMENTINTERV_GEN_ALL_ED
Implemented All Fall Risk Interventions:  [place] to call system. Call bell, personal items and telephone within reach. Instruct patient to call for assistance. Room bathroom lighting operational. Non-slip footwear when patient is off stretcher. Physically safe environment: no spills, clutter or unnecessary equipment. Stretcher in lowest position, wheels locked, appropriate side rails in place. Provide visual cue, wrist band, yellow gown, etc. Monitor gait and stability. Monitor for mental status changes and reorient to person, place, and time. Review medications for side effects contributing to fall risk. Reinforce activity limits and safety measures with patient and family.

## 2018-12-31 NOTE — ED ADULT TRIAGE NOTE - CHIEF COMPLAINT QUOTE
unwitnessed fall at home. daughter called 911 after hearing patient scream, per daughter patient was found on the ground and unconscious for couple of minutes. patient denies falling.  +ETOH. was discharged from hospital earlier today for etoh withdrawal. unwitnessed fall at home. daughter called 911 after hearing patient scream, per daughter patient was found on the ground and unconscious for couple of minutes. patient alert and oriented on arrival, patient denies falling. denies pain or discomfort. states she did not hit her head. +ETOH. was discharged from hospital earlier today for etoh withdrawal.

## 2018-12-31 NOTE — ED ADULT NURSE REASSESSMENT NOTE - NS ED NURSE REASSESS COMMENT FT1
Attempted to contact family via numbers [phone] and [phone] to pick pt up; unable to reach family. Pt is A&Ox3, ambulating independently with steady gait. Refusing to sign discharge papers and states "I don't need them". Per Dr. Ladd, pt medically cleared for discharge. Per charge GORDO Adams, pt to receive cab voucher. Pt states her address is [address] [place] [address], not the one listed in allscripts.
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
## 2018-12-31 NOTE — ED PROVIDER NOTE - PROGRESS NOTE DETAILS
pt ambulating throughout ED without difficulty.   states will call for cab to go home.  clinically sober.   will d/c
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
## 2018-12-31 NOTE — ED PROVIDER NOTE - OBJECTIVE STATEMENT
52 y/o female in ED stating daughter called 911 after finding her on the floor in her bedroom.   pt states that she did not fall out of bed.   pt states that her dog is dying and she was sleeping on the floor to comfort her dog.   pt with no complaints.    states wants to go home but agree with blood test.

## 2019-01-03 ENCOUNTER — EMERGENCY (EMERGENCY)
Facility: HOSPITAL | Age: 54
LOS: 0 days | Discharge: HOME | End: 2019-01-03
Attending: EMERGENCY MEDICINE | Admitting: EMERGENCY MEDICINE
Payer: MEDICAID

## 2019-01-03 VITALS
OXYGEN SATURATION: 99 % | SYSTOLIC BLOOD PRESSURE: 124 MMHG | RESPIRATION RATE: 16 BRPM | HEART RATE: 82 BPM | DIASTOLIC BLOOD PRESSURE: 61 MMHG | TEMPERATURE: 98 F

## 2019-01-03 VITALS — HEIGHT: 62 IN | WEIGHT: 119.05 LBS

## 2019-01-03 DIAGNOSIS — F10.10 ALCOHOL ABUSE, UNCOMPLICATED: ICD-10-CM

## 2019-01-03 DIAGNOSIS — Z86.79 PERSONAL HISTORY OF OTHER DISEASES OF THE CIRCULATORY SYSTEM: ICD-10-CM

## 2019-01-03 DIAGNOSIS — Z81.8 FAMILY HISTORY OF OTHER MENTAL AND BEHAVIORAL DISORDERS: ICD-10-CM

## 2019-01-03 DIAGNOSIS — Z83.3 FAMILY HISTORY OF DIABETES MELLITUS: ICD-10-CM

## 2019-01-03 DIAGNOSIS — F41.9 ANXIETY DISORDER, UNSPECIFIED: ICD-10-CM

## 2019-01-03 DIAGNOSIS — I10 ESSENTIAL (PRIMARY) HYPERTENSION: ICD-10-CM
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
## 2019-01-03 PROCEDURE — 99284 EMERGENCY DEPT VISIT MOD MDM: CPT | Mod: 25

## 2019-01-03 PROCEDURE — 99053 MED SERV 10PM-8AM 24 HR FAC: CPT

## 2019-01-03 RX ADMIN — Medication 1 MILLIGRAM(S): at 07:29

## 2019-01-03 RX ADMIN — Medication 25 MILLIGRAM(S): at 09:01

## 2019-01-03 RX ADMIN — Medication 25 MILLIGRAM(S): at 07:29

## 2019-01-03 NOTE — ED PROVIDER NOTE - NS ED MD EM SELECTION
78 yo M with hx of CVA with R. sided residual deficits, s/p PEG, trach, CAD s/p CABG (26yo), Parkinson's disease, presents for a fever from ([facility] presents for a fever, admitted with septic shock likely 2/2 UTI, shreyas, lactic acidosis.     #Neuro: Patient currently with encephalopathy, likely 2/2 sepsis and now on sedations and vent. Will wean off sedation as tolerated. C/W parkinson's meds.   #CV: Patient with septic shock. Continue with levophed at this time for MAP>65. Will continue to monitor VS closely. Will titrate down as able but now increasing. US IVC for fluids status. Lactate improved.   #Pulm: Patient with trach uncapped, and will connect to mechanical vent. Continue with monitoring respiratory status closely. Alkalosis improved with sedation  #ID: Patient with septic shock likely 2/2 UTI. Patient with cloudy urine and urine with signs of UTI. Will treat broadly with cefepime and vancomycin. SHREYAS now resolved, Will start standing vanc dosing.   #Renal/Fluid: Patient with SHREYAS, likely 2/2 sepsis ATN vs pre-renal. F/u urine lytes. Trend BMP daily, avoid nephrotoxins, renally dose medications. Now resolving with improved BP. monitor for ATN diuresis   #Heme: Patient with h/H currently stable. Continue to trend qdaily. WBC increase likely 2/2 to worsening infection vs maturing of bands.   #Endo: No issues.   #GI: Holding TF at this time. Patient with occult positive but no melanotic stools. Will trend CBC. Start PPI.   #DVT ppx: HSQ at this time. Will continue to trend CBC closely, if any signs of bleeding, will d/c HSQ.     Dae Hyeon Kim MD-[id number]  Department of Internal Medicine  Pager [phone] Detailed

## 2019-01-03 NOTE — ED PROVIDER NOTE - MEDICAL DECISION MAKING DETAILS
alcohol abuse, anxious.  scores on CIWA for diaphoresis and tremor.  Will treat with IM ativan and PO librium and pt will speak with SBRT this am.

## 2019-01-03 NOTE — ED ADULT TRIAGE NOTE - CHIEF COMPLAINT QUOTE
alcohol withdrawal today h/o alcohol abuse   +tremor +vomiting +ha +shaking d/c from [facility] 3days

## 2019-01-03 NOTE — SBIRT NOTE. - NSSBIRTSERVICES_GEN_A_ED_FT
Provided SBIRT services: Full screen positive. Referral to Treatment attempted. Screening results were reviewed with the patient and patient was provided information about healthy guidelines and potential negative consequences associated with level of risk. Motivation and readiness to reduce or stop use was discussed and goals and activities to make changes were suggested/offered.  Options discussed for further evaluation and treatment, but referral to treatment was not completed because    Patient refused    Audit Score: 40  DAST Score: refused

## 2019-01-03 NOTE — ED ADULT NURSE NOTE - OBJECTIVE STATEMENT
PT to ed for etoh. Pt states she was recently at another hospital and was d/c. Pt came here looking to stop the shakes, nausea and vomiting. Pt is a&ox3, ambulatory. Pt has generalized body shakes. No hallucinations noted. Denies hearing thing, Denies sob and chest pain.

## 2019-01-03 NOTE — ED PROVIDER NOTE - PROGRESS NOTE DETAILS
Patient without signs of acute withdrawal.  Seen and evaluated by SBIRT and opting for outpatient navigator services.  Asking for discharge.

## 2019-01-03 NOTE — ED PROVIDER NOTE - OBJECTIVE STATEMENT
54 yo female with h/o alcohol abuse and anxiety c/o withdrawal symptoms.  Patient states she has increased anxiety, shaking, nausea, insomnia over the past 2 days.  She has been struggling with alcoholism since 2012.  She was admitted to [facility] discharged on 12/30 for same.  She was on Klonopin and Librium in the hospital and was feeling well on discharge.  Was recommended admission to [facility] but refused.  Went home and did not  the prescription for the benzos, and started drinking again. She has been drinking again for the past 2 days.  Does not want inpatient detox but wants intensive outpt therapy but does not have anyone to see.  She does not want Librium and wants ativan IV.

## 2019-01-03 NOTE — SBIRT NOTE. - NSSBIRTFULLSCREEN_GEN_A_ED_FT
Patient requested to speak with health  again and was more open and receptive to a referral to treatment; health  assisted patient to call a cab home/self-pay, walked patient to waiting room.

## 2019-01-03 NOTE — SBIRT NOTE. - NSSBIRTFULLSCREEN_GEN_A_ED_FT
Patient refused SBIRT service; requested d/c GREG[facility], RN & MD aware; health  offered patient breakfast box, patient refused; health  also asked if patient needed assistance from  to plan her transportation home, patient refused.

## 2019-01-03 NOTE — ED PROVIDER NOTE - CONSTITUTIONAL, MLM
normal... Well appearing, well nourished, awake, alert, oriented to person, place, time/situation; anxious and tearful

## 2019-01-03 NOTE — ED ADULT NURSE NOTE - CHIEF COMPLAINT QUOTE
alcohol withdrawal today h/o alcohol abuse   +tremor +vomiting +ha +shaking d/c from [facility] 3days

## 2019-01-03 NOTE — SBIRT NOTE. - NSSBIRTSERVICES_GEN_A_ED_FT
Provided SBIRT services: Full screen positive. Referral to Treatment Performed. Screening results were reviewed with the patient and patient was provided information about healthy guidelines and potential negative consequences associated with level of risk. Motivation and readiness to reduce or stop use was discussed and goals and activities to make changes were suggested/offered.    Referral for complete assessment and level of care determination at a certified treatment facility was completed by contacting the treatment facility via phone, [facility] collaboration to provide ongoing case management services to patient in need of treatment and recovery support; patient consented to 24/7 service. Provided SBIRT services: Full screen positive. Referral to Treatment Performed. Screening results were reviewed with the patient and patient was provided information about healthy guidelines and potential negative consequences associated with level of risk. Motivation and readiness to reduce or stop use was discussed and goals and activities to make changes were suggested/offered.    Referral for complete assessment and level of care determination at a certified treatment facility was completed by contacting the treatment facility via phone, [facility] collaboration to provide ongoing case management services to patient in need of treatment and recovery support; patient consented to 24/7 service.

## 2019-01-05 ENCOUNTER — EMERGENCY (EMERGENCY)
Facility: HOSPITAL | Age: 54
LOS: 0 days | Discharge: LEFT BEFORE DISCHARGE | End: 2019-01-05
Attending: EMERGENCY MEDICINE | Admitting: EMERGENCY MEDICINE
Payer: MEDICAID

## 2019-01-05 VITALS
HEART RATE: 104 BPM | SYSTOLIC BLOOD PRESSURE: 116 MMHG | TEMPERATURE: 98 F | WEIGHT: 119.05 LBS | DIASTOLIC BLOOD PRESSURE: 80 MMHG | RESPIRATION RATE: 18 BRPM | HEIGHT: 62 IN | OXYGEN SATURATION: 99 %

## 2019-01-05 DIAGNOSIS — Z81.8 FAMILY HISTORY OF OTHER MENTAL AND BEHAVIORAL DISORDERS: ICD-10-CM

## 2019-01-05 DIAGNOSIS — Z04.3 ENCOUNTER FOR EXAMINATION AND OBSERVATION FOLLOWING OTHER ACCIDENT: ICD-10-CM

## 2019-01-05 DIAGNOSIS — F41.9 ANXIETY DISORDER, UNSPECIFIED: ICD-10-CM

## 2019-01-05 DIAGNOSIS — I10 ESSENTIAL (PRIMARY) HYPERTENSION: ICD-10-CM

## 2019-01-05 DIAGNOSIS — Z86.79 PERSONAL HISTORY OF OTHER DISEASES OF THE CIRCULATORY SYSTEM: ICD-10-CM

## 2019-01-05 DIAGNOSIS — Z83.3 FAMILY HISTORY OF DIABETES MELLITUS: ICD-10-CM

## 2019-01-05 DIAGNOSIS — M50.30 OTHER CERVICAL DISC DEGENERATION, UNSPECIFIED CERVICAL REGION: ICD-10-CM

## 2019-01-05 DIAGNOSIS — R93.0 ABNORMAL FINDINGS ON DIAGNOSTIC IMAGING OF SKULL AND HEAD, NOT ELSEWHERE CLASSIFIED: ICD-10-CM

## 2019-01-05 PROCEDURE — 99284 EMERGENCY DEPT VISIT MOD MDM: CPT | Mod: 25

## 2019-01-05 PROCEDURE — 70450 CT HEAD/BRAIN W/O DYE: CPT | Mod: 26

## 2019-01-05 PROCEDURE — 72125 CT NECK SPINE W/O DYE: CPT | Mod: 26

## 2019-01-05 PROCEDURE — 99053 MED SERV 10PM-8AM 24 HR FAC: CPT

## 2019-01-05 PROCEDURE — 76377 3D RENDER W/INTRP POSTPROCES: CPT | Mod: 26

## 2019-01-05 PROCEDURE — 70486 CT MAXILLOFACIAL W/O DYE: CPT | Mod: 26
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
## 2019-01-05 NOTE — ED PROVIDER NOTE - PROGRESS NOTE DETAILS
offered plastic surgery to fix nose. Pt refusing. states Dr. payton is her neighbor and he is plastics but she does not want him to fix this nor any other plastic surgeon. Pt wants glue on the laceration. Aware of scar. Pt refusing to wait for radiologic studies and will sign AMA.
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
## 2019-01-09 PROCEDURE — 83735 ASSAY OF MAGNESIUM: CPT

## 2019-01-09 PROCEDURE — 81001 URINALYSIS AUTO W/SCOPE: CPT

## 2019-01-09 PROCEDURE — 96376 TX/PRO/DX INJ SAME DRUG ADON: CPT

## 2019-01-09 PROCEDURE — 84702 CHORIONIC GONADOTROPIN TEST: CPT

## 2019-01-09 PROCEDURE — 85730 THROMBOPLASTIN TIME PARTIAL: CPT

## 2019-01-09 PROCEDURE — 96374 THER/PROPH/DIAG INJ IV PUSH: CPT

## 2019-01-09 PROCEDURE — 76705 ECHO EXAM OF ABDOMEN: CPT

## 2019-01-09 PROCEDURE — 87086 URINE CULTURE/COLONY COUNT: CPT

## 2019-01-09 PROCEDURE — 80053 COMPREHEN METABOLIC PANEL: CPT

## 2019-01-09 PROCEDURE — 80307 DRUG TEST PRSMV CHEM ANLYZR: CPT

## 2019-01-09 PROCEDURE — 93005 ELECTROCARDIOGRAM TRACING: CPT

## 2019-01-09 PROCEDURE — 36415 COLL VENOUS BLD VENIPUNCTURE: CPT

## 2019-01-09 PROCEDURE — 80074 ACUTE HEPATITIS PANEL: CPT

## 2019-01-09 PROCEDURE — 99285 EMERGENCY DEPT VISIT HI MDM: CPT | Mod: 25

## 2019-01-09 PROCEDURE — 85610 PROTHROMBIN TIME: CPT

## 2019-01-09 PROCEDURE — 85027 COMPLETE CBC AUTOMATED: CPT

## 2019-02-06 ENCOUNTER — INPATIENT (INPATIENT)
Facility: HOSPITAL | Age: 54
LOS: 2 days | Discharge: LEFT BEFORE DISCHARGE | DRG: 894 | End: 2019-02-09
Attending: INTERNAL MEDICINE | Admitting: INTERNAL MEDICINE
Payer: MEDICAID

## 2019-02-06 VITALS
HEART RATE: 115 BPM | RESPIRATION RATE: 18 BRPM | HEIGHT: 62 IN | WEIGHT: 121.92 LBS | TEMPERATURE: 98 F | DIASTOLIC BLOOD PRESSURE: 84 MMHG | OXYGEN SATURATION: 100 % | SYSTOLIC BLOOD PRESSURE: 155 MMHG

## 2019-02-06 DIAGNOSIS — F10.230 ALCOHOL DEPENDENCE WITH WITHDRAWAL, UNCOMPLICATED: ICD-10-CM

## 2019-02-06 DIAGNOSIS — F10.10 ALCOHOL ABUSE, UNCOMPLICATED: ICD-10-CM

## 2019-02-06 LAB
ALBUMIN SERPL ELPH-MCNC: 4.8 G/DL — SIGNIFICANT CHANGE UP (ref 3.3–5.2)
ALP SERPL-CCNC: 197 U/L — HIGH (ref 40–120)
ALT FLD-CCNC: 54 U/L — HIGH
ANION GAP SERPL CALC-SCNC: 16 MMOL/L — SIGNIFICANT CHANGE UP (ref 5–17)
APAP SERPL-MCNC: <7.5 UG/ML — LOW (ref 10–26)
APTT BLD: 35.1 SEC — SIGNIFICANT CHANGE UP (ref 27.5–36.3)
AST SERPL-CCNC: 63 U/L — HIGH
BILIRUB SERPL-MCNC: 0.6 MG/DL — SIGNIFICANT CHANGE UP (ref 0.4–2)
BUN SERPL-MCNC: 12 MG/DL — SIGNIFICANT CHANGE UP (ref 8–20)
CALCIUM SERPL-MCNC: 9.6 MG/DL — SIGNIFICANT CHANGE UP (ref 8.6–10.2)
CHLORIDE SERPL-SCNC: 94 MMOL/L — LOW (ref 98–107)
CO2 SERPL-SCNC: 28 MMOL/L — SIGNIFICANT CHANGE UP (ref 22–29)
CREAT SERPL-MCNC: 0.59 MG/DL — SIGNIFICANT CHANGE UP (ref 0.5–1.3)
ETHANOL SERPL-MCNC: <10 MG/DL — SIGNIFICANT CHANGE UP
GLUCOSE SERPL-MCNC: 97 MG/DL — SIGNIFICANT CHANGE UP (ref 70–115)
HCG SERPL-ACNC: <4 MIU/ML — SIGNIFICANT CHANGE UP
HCT VFR BLD CALC: 42.7 % — SIGNIFICANT CHANGE UP (ref 37–47)
HGB BLD-MCNC: 14.5 G/DL — SIGNIFICANT CHANGE UP (ref 12–16)
INR BLD: 0.95 RATIO — SIGNIFICANT CHANGE UP (ref 0.88–1.16)
MAGNESIUM SERPL-MCNC: 2 MG/DL — SIGNIFICANT CHANGE UP (ref 1.6–2.6)
MCHC RBC-ENTMCNC: 33.7 PG — HIGH (ref 27–31)
MCHC RBC-ENTMCNC: 34 G/DL — SIGNIFICANT CHANGE UP (ref 32–36)
MCV RBC AUTO: 99.3 FL — HIGH (ref 81–99)
PHOSPHATE SERPL-MCNC: 3.6 MG/DL — SIGNIFICANT CHANGE UP (ref 2.4–4.7)
PLATELET # BLD AUTO: 259 K/UL — SIGNIFICANT CHANGE UP (ref 150–400)
POTASSIUM SERPL-MCNC: 4.4 MMOL/L — SIGNIFICANT CHANGE UP (ref 3.5–5.3)
POTASSIUM SERPL-SCNC: 4.4 MMOL/L — SIGNIFICANT CHANGE UP (ref 3.5–5.3)
PROT SERPL-MCNC: 8.6 G/DL — SIGNIFICANT CHANGE UP (ref 6.6–8.7)
PROTHROM AB SERPL-ACNC: 10.9 SEC — SIGNIFICANT CHANGE UP (ref 10–12.9)
RBC # BLD: 4.3 M/UL — LOW (ref 4.4–5.2)
RBC # FLD: 13.5 % — SIGNIFICANT CHANGE UP (ref 11–15.6)
SALICYLATES SERPL-MCNC: <0.6 MG/DL — LOW (ref 10–20)
SODIUM SERPL-SCNC: 138 MMOL/L — SIGNIFICANT CHANGE UP (ref 135–145)
WBC # BLD: 8 K/UL — SIGNIFICANT CHANGE UP (ref 4.8–10.8)
WBC # FLD AUTO: 8 K/UL — SIGNIFICANT CHANGE UP (ref 4.8–10.8)

## 2019-02-06 PROCEDURE — 99285 EMERGENCY DEPT VISIT HI MDM: CPT

## 2019-02-06 PROCEDURE — 70450 CT HEAD/BRAIN W/O DYE: CPT | Mod: 26

## 2019-02-06 PROCEDURE — 71046 X-RAY EXAM CHEST 2 VIEWS: CPT | Mod: 26

## 2019-02-06 PROCEDURE — 73110 X-RAY EXAM OF WRIST: CPT | Mod: 26,RT

## 2019-02-06 PROCEDURE — 73130 X-RAY EXAM OF HAND: CPT | Mod: 26,RT

## 2019-02-06 RX ORDER — SODIUM CHLORIDE 9 MG/ML
1000 INJECTION, SOLUTION INTRAVENOUS
Qty: 0 | Refills: 0 | Status: DISCONTINUED | OUTPATIENT
Start: 2019-02-06 | End: 2019-02-07

## 2019-02-06 RX ORDER — SODIUM CHLORIDE 9 MG/ML
1000 INJECTION INTRAMUSCULAR; INTRAVENOUS; SUBCUTANEOUS ONCE
Qty: 0 | Refills: 0 | Status: COMPLETED | OUTPATIENT
Start: 2019-02-06 | End: 2019-02-06

## 2019-02-06 RX ADMIN — Medication 4 MILLIGRAM(S): at 16:33

## 2019-02-06 RX ADMIN — Medication 2 MILLIGRAM(S): at 18:59

## 2019-02-06 RX ADMIN — SODIUM CHLORIDE 125 MILLILITER(S): 9 INJECTION, SOLUTION INTRAVENOUS at 18:35

## 2019-02-06 RX ADMIN — Medication 50 MILLIGRAM(S): at 16:33

## 2019-02-06 RX ADMIN — SODIUM CHLORIDE 4000 MILLILITER(S): 9 INJECTION INTRAMUSCULAR; INTRAVENOUS; SUBCUTANEOUS at 16:35

## 2019-02-06 RX ADMIN — SODIUM CHLORIDE 1000 MILLILITER(S): 9 INJECTION INTRAMUSCULAR; INTRAVENOUS; SUBCUTANEOUS at 18:35

## 2019-02-06 RX ADMIN — Medication 2 MILLIGRAM(S): at 23:45

## 2019-02-06 NOTE — ED PROVIDER NOTE - OBJECTIVE STATEMENT
54yo F alcohol abuse, has been admitted 2-3 x in last month for withdrawal, has not gotten into rehab, starts getting shakes and drinks to stop them. has been blacking out. frequent falls. thinks hit head on toilet. also with rt hand pain. here for help. very anxious and nauseous, mild HA. no CP/SOB. +tremors. no h/o seizures. not on blood thinners. wants to go to rehab but to none on LI.

## 2019-02-06 NOTE — CONSULT NOTE ADULT - ASSESSMENT
IMP:    CT HEAD WITH A linear hyperdensity arising from the RIGHT periventricular centrum   semiovale ovale to the frontal cortex likely indicating venous angioma   versus atypical presentation of a linear hemorrhage . hemorrhage is less   likely due to unchanged appearance over 30 days compared to prior exam IMP:    CT HEAD WITH A linear hyperdensity arising from the RIGHT periventricular centrum   semiovale ovale to the frontal cortex likely indicating venous angioma   versus atypical presentation of a linear hemorrhage . hemorrhage is less   likely due to unchanged appearance over 30 days compared to prior exam       PLAN:   CTA/CTV IMP:    CT HEAD WITH A linear hyperdensity arising from the RIGHT periventricular centrum   semiovale ovale to the frontal cortex likely indicating venous angioma   versus atypical presentation of a linear hemorrhage . hemorrhage is less   likely due to unchanged appearance over 30 days compared to prior exam   CTA/CTV done pending read    NEURO EXAM INTACT WITH STABLE FINDING ON CT HEAD          PLAN: DISCUSSED WITH DR KNOTT      MRI BRAIN W & W/ O CONTRAST WHEN ABLE IMP:  CT HEAD WITH A linear hyperdensity arising from the RIGHT periventricular centrum   semiovale ovale to the frontal cortex likely indicating venous angioma   versus atypical presentation of a linear hemorrhage . hemorrhage is less   likely due to unchanged appearance over 30 days compared to prior exam   CTA/CTV BRAIN:     Right frontal lobe DVA. No major vessel occlusion, hemodynamically   significant stenosis or aneurysm.         NEURO EXAM INTACT WITH STABLE FINDING ON CT HEAD  DEEP VENOUS ANOMALY      PLAN: DISCUSSED WITH DR KNOTT    MRI BRAIN W & W/O CONTRAST WHEN ABLE, WE WILL FOLLOW UP  NEURO CKS Q4H WHILE IN HOSP  CAN FOLLOW UP WITH HER NEUROLOGIST DR IYER AFTER DISCHARGE
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
## 2019-02-06 NOTE — CONSULT NOTE ADULT - SUBJECTIVE AND OBJECTIVE BOX
Patient is a 53y old  Female who presents with a chief complaint of     HPI:    pt c/o + -headache  /10 on the pain scale   + - Nausea /+ - Vomiting  admits denies weakness  admits denies numbness/ tingling  admist denies visual changes  admist denies C/T/LS  Spine pain    PAST MEDICAL:  Anxiety  Hypertension  Subdural hematoma  ETOH abuse    SURGICAL HISTORY:  No significant past surgical history      SOCIAL HISTORY: +  EtOH ABUSE, + - tobacco, + - drugs    FAMILY HISTORY:  Family history of diabetes mellitus (DM) (Mother)  Family history of anxiety disorder (Father)      ROS:  CONSTITUTIONAL: No fever, weight loss, or fatigue  EYES: No eye pain, visual disturbances, or discharge  NECK: No pain or stiffness  BREASTS: No pain, masses, or nipple discharge  RESPIRATORY: No cough, wheezing, chills or hemoptysis; No shortness of breath  CARDIOVASCULAR: No chest pain, palpitations, dizziness, or leg swelling  GASTROINTESTINAL: No abdominal or epigastric pain. No nausea, vomiting, or hematemesis; No diarrhea or constipation. No melena or hematochezia.  GENITOURINARY: No dysuria, frequency, hematuria, or incontinence  NEUROLOGICAL: No headaches, memory loss, loss of strength, numbness, or tremors  SKIN: No itching, burning, rashes, or lesions   LYMPH NODES: No enlarged glands  ENDOCRINE: No heat or cold intolerance; No hair loss  MUSCULOSKELETAL: No joint pain or swelling; No muscle, back, or extremity pain  PSYCHIATRIC: No depression, anxiety, mood swings, or difficulty sleeping  HEME/LYMPH: No easy bruising, or bleeding gums  ALLERY AND IMMUNOLOGIC: No hives or eczema      MEDICATIONS  (STANDING):  dextrose 5% + lactated ringers 1000 milliLiter(s) (125 mL/Hr) IV Continuous <Continuous>    MEDICATIONS  (PRN):  LORazepam   Injectable 2 milliGRAM(s) IV Push every 1 hour PRN Symptom-triggered: each CIWA -Ar score 8 or GREATER      Allergies:  No Known Allergies            Vital Signs Last 24 Hrs  T(C): 37.4 (06 Feb 2019 19:21), Max: 37.4 (06 Feb 2019 19:21)  T(F): 99.4 (06 Feb 2019 19:21), Max: 99.4 (06 Feb 2019 19:21)  HR: 107 (06 Feb 2019 19:21) (107 - 115)  BP: 126/77 (06 Feb 2019 19:21) (126/77 - 155/84)  BP(mean): --  RR: 18 (06 Feb 2019 19:21) (18 - 18)  SpO2: 97% (06 Feb 2019 19:21) (97% - 100%)    PHYSICAL EXAM:  GENERAL: NAD, well-groomed, well-developed  HEAD:  Atraumatic, Normocephalic  EYES: EOMI, PERRLA, conjunctiva and sclera clear  ENMT: No tonsillar erythema, exudates, or enlargement; Moist mucous membranes, Good dentition, No lesions  NECK: Supple, No JVD  NERVOUS SYSTEM:  Alert & Oriented X3, Good concentration; Motor Strength 5/5 B/L upper and lower extremities; DTRs 2+ intact and symmetric  CHEST/LUNG: Clear  bilaterally; No rales, rhonchi, wheezing, or rubs  HEART: Regular rate   ABDOMEN: Soft, Nontender, Nondistended; Bowel sounds present  EXTREMITIES:  2+ Peripheral Pulses, No clubbing, cyanosis, or edema  LYMPH: No lymphadenopathy noted  SKIN: No rashes or lesions      RADIOLOGY & ADDITIONAL STUDIES:      CT HEAD:      Hypervascular linear structure likely venous angioma extends from the RIGHT   lateral ventricle to the RIGHT frontal cortex as displayed on coronal image   25 series 4 and on axial image 23 series 2 also seen in retrospect on prior   CT scan 1/5/2019 coronal image 53 series 602 . finding not seen on prior   6/29/2012 brain CT scan.   The ventricles and sulci are normal for the patient's age.   The posterior fossa structures and basal cisterns appear normal.   There is no intracranial hemorrhage.   There is no intracranial mass or midline shift.   There is no sulcal effacement to suggest acute stroke.   The basal ganglia and thalami appear normal in morphology and attenuation.   The visualized portions of the optic globes and paranasal sinuses are normal.   There are no skull fractures.   IMPRESSION:   A linear hyperdensity arising from the RIGHT periventricular centrum   semiovale ovale to the frontal cortex likely indicating venous angioma   versus atypical presentation of a linear hemorrhage . hemorrhage is less   likely due to unchanged appearance over 30 days compared to prior exam dated   1/5/2019.. However finding was not seen on prior CT scan dated 6/29/2012..   In light of trauma with neurological symptoms a follow-up MRI recommended..   Remaining brain parenchyma unremarkable. .  CTA BRAIN:                        14.5   8.0   )-----------( 259      ( 06 Feb 2019 16:43 )             42.7     02-06    138  |  94<L>  |  12.0  ----------------------------<  97  4.4   |  28.0  |  0.59    Ca    9.6      06 Feb 2019 16:43  Phos  3.6     02-06  Mg     2.0     02-06    TPro  8.6  /  Alb  4.8  /  TBili  0.6  /  DBili  x   /  AST  63<H>  /  ALT  54<H>  /  AlkPhos  197<H>  02-06    PT/INR - ( 06 Feb 2019 16:43 )   PT: 10.9 sec;   INR: 0.95 ratio         PTT - ( 06 Feb 2019 16:43 )  PTT:35.1 sec cc: Patient is a 53y old  Female who presents with a chief complaint of need for alcohol detox.  Source: Patient herself. Competent source   HPI: Patient is a 53y old  Female who presents with a chief complaint of need for alcohol detox. Described frequent falls and "black outs" so CT head was done.   Reading documents stable from CT head 30 days ago, finding of r periventricular centrum semiovale finding as venous angioma vs linear hemorrhage.   Admits to  memory loss as a result of head trauma in 2012, and etoh abuse.   pt c/o  -headache   +  Nausea now resolved,  -Vomiting  s denies weakness   denies numbness/ tingling   denies visual changes, no hearing or speech changes   denies C/T/LS  Spine pain    PAST MEDICAL:  Anxiety  Hypertension  left Subdural hematoma w skull fx  2012 ( treated at [facility])  ? evd   ETOH abuse    SURGICAL HISTORY:  No significant past surgical history      SOCIAL HISTORY: +  EtOH ABUSE,  - to 1.5 bottles of wine and 5 small vodka bottled q day, no  tobacco  ,  - drugs    FAMILY HISTORY:  Family history of diabetes mellitus (DM) (Mother)  Family history of anxiety disorder (Father)      ROS:  CONSTITUTIONAL: No fever, +weight loss, or fatigue, decreased appetite,   EYES: No eye pain, visual disturbances, or discharge  NECK: No pain or stiffness  RESPIRATORY: No cough, wheezing, chills or hemoptysis; No shortness of breath  CARDIOVASCULAR: No chest pain, palpitations, dizziness, or leg swelling  GASTROINTESTINAL: No abdominal or epigastric pain. + nausea, vomiting, or hematemesis; No diarrhea or constipation. No melena or hematochezia.  GENITOURINARY: No dysuria, frequency, hematuria, or incontinence + frequent uti's  NEUROLOGICAL: No headaches, + memory loss since head trauma 2012, no  loss of strength, numbness, +tremors related to etoh  SKIN: No itching, burning, rashes, or lesions   LYMPH NODES: No enlarged glands  ENDOCRINE: No heat or cold intolerance; No hair loss  MUSCULOSKELETAL: No joint pain or swelling; No muscle, back, or extremity pain  PSYCHIATRIC: + depression, +anxiety, +mood swings, + difficulty sleeping  HEME/LYMPH: + easy bruising, + bleeding gums  ALLERY AND IMMUNOLOGIC: No hives or eczema      MEDICATIONS  (STANDING):  dextrose 5% + lactated ringers 1000 milliLiter(s) (125 mL/Hr) IV Continuous <Continuous>    MEDICATIONS  (PRN):  LORazepam   Injectable 2 milliGRAM(s) IV Push every 1 hour PRN Symptom-triggered: each CIWA -Ar score 8 or GREATER      Allergies:  No Known Allergies      Vital Signs Last 24 Hrs  T(C): 37.4 (06 Feb 2019 19:21), Max: 37.4 (06 Feb 2019 19:21)  T(F): 99.4 (06 Feb 2019 19:21), Max: 99.4 (06 Feb 2019 19:21)  HR: 107 (06 Feb 2019 19:21) (107 - 115)  BP: 126/77 (06 Feb 2019 19:21) (126/77 - 155/84)  BP(mean): --  RR: 18 (06 Feb 2019 19:21) (18 - 18)  SpO2: 97% (06 Feb 2019 19:21) (97% - 100%)    PHYSICAL EXAM:  GENERAL: NAD, well-groomed, well-developed  HEAD:  Atraumatic, Normocephalic  EYES: EOMI, PERRLA, conjunctiva and sclera clear  ENMT: No tonsillar erythema, exudates, or enlargement; Moist mucous membranes, Good dentition  NECK: Supple, No JVD  NERVOUS SYSTEM:  Alert & Oriented X3, Good concentration;   perrla, eomi,   cranial nerves 2-12 intact, gross visual acuity and fields intact,  Motor Strength 5/5 B/L upper and lower extremities;   sensory intact allk  fine motor and coordination intact,  no tremors, no pronator drift.  CHEST/LUNG: Clear  bilaterally; No rales, rhonchi, wheezing, or rubs  HEART: Regular rate   ABDOMEN: Soft, Nontender, Nondistended; Bowel sounds present  EXTREMITIES:  2+ Peripheral Pulses radial and DP, No clubbing, cyanosis, or edema  LYMPH: No lymphadenopathy noted  SKIN: No rashes or lesions      RADIOLOGY & ADDITIONAL STUDIES:      2/6/19 CT HEAD:      Hypervascular linear structure likely venous angioma extends from the RIGHT   lateral ventricle to the RIGHT frontal cortex as displayed on coronal image   25 series 4 and on axial image 23 series 2 also seen in retrospect on prior   CT scan 1/5/2019 coronal image 53 series 602 . finding not seen on prior   6/29/2012 brain CT scan.   The ventricles and sulci are normal for the patient's age.   The posterior fossa structures and basal cisterns appear normal.   There is no intracranial hemorrhage.   There is no intracranial mass or midline shift.   There is no sulcal effacement to suggest acute stroke.   The basal ganglia and thalami appear normal in morphology and attenuation.   The visualized portions of the optic globes and paranasal sinuses are normal.   There are no skull fractures.   IMPRESSION:   A linear hyperdensity arising from the RIGHT periventricular centrum   semiovale ovale to the frontal cortex likely indicating venous angioma   versus atypical presentation of a linear hemorrhage . hemorrhage is less   likely due to unchanged appearance over 30 days compared to prior exam dated   1/5/2019.. However finding was not seen on prior CT scan dated 6/29/2012..   In light of trauma with neurological symptoms a follow-up MRI recommended..   Remaining brain parenchyma unremarkable. .  CTA /CTV  BRAIN:                        14.5   8.0   )-----------( 259      ( 06 Feb 2019 16:43 )             42.7     02-06    138  |  94<L>  |  12.0  ----------------------------<  97  4.4   |  28.0  |  0.59    Ca    9.6      06 Feb 2019 16:43  Phos  3.6     02-06  Mg     2.0     02-06    TPro  8.6  /  Alb  4.8  /  TBili  0.6  /  DBili  x   /  AST  63<H>  /  ALT  54<H>  /  AlkPhos  197<H>  02-06    PT/INR - ( 06 Feb 2019 16:43 )   PT: 10.9 sec;   INR: 0.95 ratio         PTT - ( 06 Feb 2019 16:43 )  PTT:35.1 sec cc: Patient is a 53y old  Female who presents with a chief complaint of need for alcohol detox.  Source: Patient herself. Competent source   HPI: Patient is a 53y old  Female who presents with a chief complaint of need for alcohol detox. Described frequent falls and "black outs" so CT head was done.   Reading documents stable from CT head 30 days ago, finding of r periventricular centrum semiovale finding as venous angioma vs linear hemorrhage.   Admits to  memory loss as a result of head trauma in 2012, and etoh abuse.   pt c/o  -headache   +  Nausea now resolved,  -Vomiting  denies weakness  denies numbness/ tingling  denies visual changes, no hearing or speech changes   denies C/T/LS  Spine pain    PAST MEDICAL:  Anxiety  Hypertension  left Subdural hematoma w skull fx  2012 ( treated at [facility])  ? evd   ETOH abuse    SURGICAL HISTORY:  No significant past surgical history      SOCIAL HISTORY: +  EtOH ABUSE,  - to 1.5 bottles of wine and 5 small vodka bottled q day, no  tobacco  ,  - drugs    FAMILY HISTORY:  Family history of diabetes mellitus (DM) (Mother)  Family history of anxiety disorder (Father)      ROS:  CONSTITUTIONAL: No fever, +weight loss, or fatigue, decreased appetite,   EYES: No eye pain, visual disturbances, or discharge  NECK: No pain or stiffness  RESPIRATORY: No cough, wheezing, chills or hemoptysis; No shortness of breath  CARDIOVASCULAR: No chest pain, palpitations, dizziness, or leg swelling  GASTROINTESTINAL: No abdominal or epigastric pain. + nausea, vomiting, or hematemesis; No diarrhea or constipation. No melena or hematochezia.  GENITOURINARY: No dysuria, frequency, hematuria, or incontinence + frequent uti's  NEUROLOGICAL: No headaches, + memory loss since head trauma 2012, no  loss of strength, numbness, +tremors related to etoh  SKIN: No itching, burning, rashes, or lesions   LYMPH NODES: No enlarged glands  ENDOCRINE: No heat or cold intolerance; No hair loss  MUSCULOSKELETAL: No joint pain or swelling; No muscle, back, or extremity pain  PSYCHIATRIC: + depression, +anxiety, +mood swings, + difficulty sleeping  HEME/LYMPH: + easy bruising, + bleeding gums  ALLERY AND IMMUNOLOGIC: No hives or eczema      MEDICATIONS  (STANDING):  dextrose 5% + lactated ringers 1000 milliLiter(s) (125 mL/Hr) IV Continuous <Continuous>    MEDICATIONS  (PRN):  LORazepam   Injectable 2 milliGRAM(s) IV Push every 1 hour PRN Symptom-triggered: each CIWA -Ar score 8 or GREATER      Allergies:  No Known Allergies      Vital Signs Last 24 Hrs  T(C): 37.4 (06 Feb 2019 19:21), Max: 37.4 (06 Feb 2019 19:21)  T(F): 99.4 (06 Feb 2019 19:21), Max: 99.4 (06 Feb 2019 19:21)  HR: 107 (06 Feb 2019 19:21) (107 - 115)  BP: 126/77 (06 Feb 2019 19:21) (126/77 - 155/84)  BP(mean): --  RR: 18 (06 Feb 2019 19:21) (18 - 18)  SpO2: 97% (06 Feb 2019 19:21) (97% - 100%)    PHYSICAL EXAM:  GENERAL: NAD, well-groomed, well-developed  HEAD:  Atraumatic, Normocephalic  EYES: EOMI, PERRLA, conjunctiva and sclera clear  ENMT: No tonsillar erythema, exudates, or enlargement; Moist mucous membranes, Good dentition  NECK: Supple, No JVD  NERVOUS SYSTEM:  Alert & Oriented X3, Good concentration;   perrla, eomi,   cranial nerves 2-12 intact, gross visual acuity and fields intact, face symmetric, tongue midline,   Motor Strength 5/5 B/L upper and lower extremities;   sensory intact all   fine motor and coordination intact,  no tremors, no pronator drift.  CHEST/LUNG: Clear  bilaterally; No rales, rhonchi, wheezing, or rubs  HEART: Regular rate   ABDOMEN: Soft, Nontender, Nondistended; Bowel sounds present  EXTREMITIES:  2+ Peripheral Pulses radial and DP, No clubbing, cyanosis, or edema  LYMPH: No lymphadenopathy noted  SKIN: No rashes or lesions      RADIOLOGY & ADDITIONAL STUDIES:      2/6/19 CT HEAD:      Hypervascular linear structure likely venous angioma extends from the RIGHT   lateral ventricle to the RIGHT frontal cortex as displayed on coronal image   25 series 4 and on axial image 23 series 2 also seen in retrospect on prior   CT scan 1/5/2019 coronal image 53 series 602 . finding not seen on prior   6/29/2012 brain CT scan.   The ventricles and sulci are normal for the patient's age.   The posterior fossa structures and basal cisterns appear normal.   There is no intracranial hemorrhage.   There is no intracranial mass or midline shift.   There is no sulcal effacement to suggest acute stroke.   The basal ganglia and thalami appear normal in morphology and attenuation.   The visualized portions of the optic globes and paranasal sinuses are normal.   There are no skull fractures.   IMPRESSION:   A linear hyperdensity arising from the RIGHT periventricular centrum   semiovale ovale to the frontal cortex likely indicating venous angioma   versus atypical presentation of a linear hemorrhage . hemorrhage is less   likely due to unchanged appearance over 30 days compared to prior exam dated   1/5/2019.. However finding was not seen on prior CT scan dated 6/29/2012..   In light of trauma with neurological symptoms a follow-up MRI recommended..   Remaining brain parenchyma unremarkable. .  CTA /CTV  BRAIN: pending read                       14.5   8.0   )-----------( 259      ( 06 Feb 2019 16:43 )             42.7     02-06    138  |  94<L>  |  12.0  ----------------------------<  97  4.4   |  28.0  |  0.59    Ca    9.6      06 Feb 2019 16:43  Phos  3.6     02-06  Mg     2.0     02-06    TPro  8.6  /  Alb  4.8  /  TBili  0.6  /  DBili  x   /  AST  63<H>  /  ALT  54<H>  /  AlkPhos  197<H>  02-06    PT/INR - ( 06 Feb 2019 16:43 )   PT: 10.9 sec;   INR: 0.95 ratio         PTT - ( 06 Feb 2019 16:43 )  PTT:35.1 sec cc: Patient is a 53y old  Female who presents with a chief complaint of need for alcohol detox.  Source: Patient herself. Competent source   HPI: Patient is a 53y old  Female who presents with a chief complaint of need for alcohol detox. Described frequent falls and "black outs" so CT head was done.   Reading documents stable from CT head 30 days ago, finding of r periventricular centrum semiovale finding as venous angioma vs linear hemorrhage.   Admits to  memory loss as a result of head trauma in 2012, and etoh abuse.   pt c/o  -headache   +  Nausea now resolved,  -Vomiting  denies weakness  denies numbness/ tingling  denies visual changes, no hearing or speech changes   denies C/T/LS  Spine pain    PAST MEDICAL:  Anxiety  Hypertension  left Subdural hematoma w skull fx  2012 ( treated at [facility])  ? evd   ETOH abuse    SURGICAL HISTORY:  No significant past surgical history      SOCIAL HISTORY: +  EtOH ABUSE,  - to 1.5 bottles of wine and 5 small vodka bottled q day, no  tobacco  ,  - drugs    FAMILY HISTORY:  Family history of diabetes mellitus (DM) (Mother)  Family history of anxiety disorder (Father)      ROS:  CONSTITUTIONAL: No fever, +weight loss, or fatigue, decreased appetite,   EYES: No eye pain, visual disturbances, or discharge  NECK: No pain or stiffness  RESPIRATORY: No cough, wheezing, chills or hemoptysis; No shortness of breath  CARDIOVASCULAR: No chest pain, palpitations, dizziness, or leg swelling  GASTROINTESTINAL: No abdominal or epigastric pain. + nausea, vomiting, or hematemesis; No diarrhea or constipation. No melena or hematochezia.  GENITOURINARY: No dysuria, frequency, hematuria, or incontinence + frequent uti's  NEUROLOGICAL: No headaches, + memory loss since head trauma 2012, no  loss of strength, numbness, +tremors related to etoh  SKIN: No itching, burning, rashes, or lesions   LYMPH NODES: No enlarged glands  ENDOCRINE: No heat or cold intolerance; No hair loss  MUSCULOSKELETAL: No joint pain or swelling; No muscle, back, or extremity pain  PSYCHIATRIC: + depression, +anxiety, +mood swings, + difficulty sleeping  HEME/LYMPH: + easy bruising, + bleeding gums  ALLERY AND IMMUNOLOGIC: No hives or eczema      MEDICATIONS  (STANDING):  dextrose 5% + lactated ringers 1000 milliLiter(s) (125 mL/Hr) IV Continuous <Continuous>    MEDICATIONS  (PRN):  LORazepam   Injectable 2 milliGRAM(s) IV Push every 1 hour PRN Symptom-triggered: each CIWA -Ar score 8 or GREATER      Allergies:  No Known Allergies      Vital Signs Last 24 Hrs  T(C): 37.4 (06 Feb 2019 19:21), Max: 37.4 (06 Feb 2019 19:21)  T(F): 99.4 (06 Feb 2019 19:21), Max: 99.4 (06 Feb 2019 19:21)  HR: 107 (06 Feb 2019 19:21) (107 - 115)  BP: 126/77 (06 Feb 2019 19:21) (126/77 - 155/84)  BP(mean): --  RR: 18 (06 Feb 2019 19:21) (18 - 18)  SpO2: 97% (06 Feb 2019 19:21) (97% - 100%)    PHYSICAL EXAM:  GENERAL: NAD, well-groomed, well-developed  HEAD:  Atraumatic, Normocephalic  EYES: EOMI, PERRLA, conjunctiva and sclera clear  ENMT: No tonsillar erythema, exudates, or enlargement; Moist mucous membranes, Good dentition  NECK: Supple, No JVD  NERVOUS SYSTEM:  Alert & Oriented X3, Good concentration;   perrla, eomi,   cranial nerves 2-12 intact, gross visual acuity and fields intact, face symmetric, tongue midline,   Motor Strength 5/5 B/L upper and lower extremities;   sensory intact all   fine motor and coordination intact,  no tremors, no pronator drift.  CHEST/LUNG: Clear  bilaterally; No rales, rhonchi, wheezing, or rubs  HEART: Regular rate   ABDOMEN: Soft, Nontender, Nondistended; Bowel sounds present  EXTREMITIES:  2+ Peripheral Pulses radial and DP, No clubbing, cyanosis, or edema  LYMPH: No lymphadenopathy noted  SKIN: No rashes or lesions      RADIOLOGY & ADDITIONAL STUDIES:      2/6/19 CT HEAD:      Hypervascular linear structure likely venous angioma extends from the RIGHT   lateral ventricle to the RIGHT frontal cortex as displayed on coronal image   25 series 4 and on axial image 23 series 2 also seen in retrospect on prior   CT scan 1/5/2019 coronal image 53 series 602 . finding not seen on prior   6/29/2012 brain CT scan.   The ventricles and sulci are normal for the patient's age.   The posterior fossa structures and basal cisterns appear normal.   There is no intracranial hemorrhage.   There is no intracranial mass or midline shift.   There is no sulcal effacement to suggest acute stroke.   The basal ganglia and thalami appear normal in morphology and attenuation.   The visualized portions of the optic globes and paranasal sinuses are normal.   There are no skull fractures.   IMPRESSION:   A linear hyperdensity arising from the RIGHT periventricular centrum   semiovale ovale to the frontal cortex likely indicating venous angioma   versus atypical presentation of a linear hemorrhage . hemorrhage is less   likely due to unchanged appearance over 30 days compared to prior exam dated   1/5/2019.. However finding was not seen on prior CT scan dated 6/29/2012..   In light of trauma with neurological symptoms a follow-up MRI recommended..   Remaining brain parenchyma unremarkable. .  CTA /CTV  BRAIN:   Non contrast CT of the brain:   Stable appearance to the linear hyperdensity within the right frontal lobe   which appears to extend the origins the cortex. This is also present on the   CT head from 1/5/2019. There is no surrounding vasogenic edema. There is no   acute intracranial hemorrhage, mass effect from vasogenic edema or evidence   for acute large vascular territory infarct. Stable mild chronic   microvascular changes.   The ventricles, sulci and cisternal spaces are stable in size and   configuration demonstrating cerebral volume loss. There is no midline shift   or abnormal extra-axial fluid collection.   The calvarium is intact. The visualized paranasal sinuses are clear.   CTA head:   The distal cervical, petrous, lacerum, cavernous, clinoid and supraclinoid   segments of both internal carotid arteries are patent with normal course and   caliber throughout the skull base. The anterior and middle cerebral arteries   demonstrate symmetric arborization without focal occlusion, hemodynamically   significant stenosis or aneurysm. Anterior communicating artery region is   unremarkable.   The intradural vertebral arteries are codominant. Both posterior inferior   cerebellar arteries are identified and unremarkable. The basilar artery and   its tributaries are patent and normal in caliber. Both posterior   communicating arteries are identified and unremarkable.   In the region of the linear hyperdensity seen on the noncontrast portion of   the study there is a enhancing vein compatible with the deep venous anomaly.   There is no evidence of an AVM. The major dural venous sinuses and   peripheral venous tributaries appear patent. There is no abnormal   intracranial enhancement.   IMPRESSION:   Head CT: Stable exam. No acute intracranial hemorrhage or calvarial   fracture. Right frontal lobe linear hyperdensity which extends beyond the   cortex likely representing a venous anomaly.     CTA head: Right frontal lobe DVA. No major vessel occlusion, hemodynamically   significant stenosis or aneurysm.                          14.5   8.0   )-----------( 259      ( 06 Feb 2019 16:43 )             42.7     02-06    138  |  94<L>  |  12.0  ----------------------------<  97  4.4   |  28.0  |  0.59    Ca    9.6      06 Feb 2019 16:43  Phos  3.6     02-06  Mg     2.0     02-06    TPro  8.6  /  Alb  4.8  /  TBili  0.6  /  DBili  x   /  AST  63<H>  /  ALT  54<H>  /  AlkPhos  197<H>  02-06    PT/INR - ( 06 Feb 2019 16:43 )   PT: 10.9 sec;   INR: 0.95 ratio         PTT - ( 06 Feb 2019 16:43 )  PTT:35.1 sec

## 2019-02-06 NOTE — ED PROVIDER NOTE - CARE PLAN
Principal Discharge DX:	ETOH abuse Principal Discharge DX:	Alcohol withdrawal syndrome without complication  Secondary Diagnosis:	ETOH abuse

## 2019-02-06 NOTE — ED PROVIDER NOTE - PHYSICAL EXAMINATION
Gen: anxious, slightly hyperactive, AOx3  Head: NCAT  HEENT: PERRL, EOMI, oral mucosa moist, normal conjunctiva, neck supple  Lung: CTAB, no respiratory distress  CV: tachy regular, no murmur, Normal perfusion  Abd: soft, NTND  MSK: No edema, +ecchymosis/swelling rt 4th distal phalanx, mild ttp rt wrist without deformity with FROM  Neuro: No focal neurologic deficits, CN II-XII intact, 5/5 global strength, sensation intact, +tremor b/l UE  Skin: No rash   Psych: normal affect

## 2019-02-06 NOTE — ED ADULT TRIAGE NOTE - CHIEF COMPLAINT QUOTE
Pt ambulatory in ED c/o wanting detox from alcohol. Pt normally drinks 1 and a half bottles of wine and about 5 mini bottles of vodka per day. Pt states " I haven't eaten in days." Pt states " I only had about 3 ounces of wine this morning. Pt denies SI or HI. Mild tremor present with arms raised.

## 2019-02-06 NOTE — ED PROVIDER NOTE - NS ED ROS FT
ROS: no CP/SOB. no cough. no fever. +n/v no d/c. no abd pain. no rash. no bleeding. no urinary complaints. no weakness. no vision changes. +HA. no neck/back pain. no extremity swelling/deformity. +anxious

## 2019-02-06 NOTE — CONSULT NOTE ADULT - CONSULT REASON
etoh withdrawal
A linear hyperdensity arising from the RIGHT periventricular centrum   semiovale ovale to the frontal cortex likely indicating venous angioma   versus atypical presentation of a linear hemorrhage . hemorrhage is less   likely due to unchanged appearance over 30 days compared to prior exam
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
## 2019-02-06 NOTE — ED PROVIDER NOTE - MEDICAL DECISION MAKING DETAILS
patient in acute withdrawal, CIWA 18, will require IV benzo/librium. screening EKG. labs. TBA for management and potential rehab lpacement

## 2019-02-06 NOTE — ED PROVIDER NOTE - PROGRESS NOTE DETAILS
CIWA improved, will admit for detox -Slowey DO ICU consulted due to initial elevated CIWA >15. ct HEAD hyperdensity seen on January CT as well, possible venous angioma vs atypical linear bleed. NSx consulted recommend CTA at this time -Uday DO ICU declined, CTA and CTV pending for neurosurgery. TBA hospitalist for withdrawal if CT normal. Pt signed out to Dr. Ranjana Mejia DO CTA results as noted.  Case d/w Hospitalist and will admit for alcohol withdrwal

## 2019-02-06 NOTE — CONSULT NOTE ADULT - SUBJECTIVE AND OBJECTIVE BOX
Patient is a 53y old  Female who presents with a chief complaint of     BRIEF HOSPITAL COURSE: 54 y/o female with pmhx of etoh abuse, anxiety, SDH from 19 foot fall), who has been admitted in the past multiple times for etoh withdrawal presents with nausea/vomiting and tremors. Upon arrival patients CIWA score was 21, she received 4 mg ativan and 50 mg librium and responded well. CIWA scores since have been 5,7. Patient also states she fell at home, all imaging has been negative, Her serum etoh level is 211. At time of my evaluation patient is resting calmly in bed without agitation. Shes alert and oriented. States she feels much better since arriving. Admits to some "pins and needles" on upper extremities. In the past she has had seizures from withdrawal once. Patient's last drink was at 2 am. She drink 1.5 big bottles of wine and 6 shots of vodka daily.     No further n/v since arrival. denies abdominal pain, hematemesis, chest pain, headache. She wants to go to rehab but has not found one that works for her.      PAST MEDICAL & SURGICAL HISTORY:  Anxiety  Hypertension  Subdural hematoma  ETOH abuse  No significant past surgical history    Allergies    No Known Allergies    Intolerances      FAMILY HISTORY:  Family history of diabetes mellitus (DM) (Mother)  Family history of anxiety disorder (Father)      Family history otherwise noncontributory.      Review of Systems:  CONSTITUTIONAL: No fever, chills, or fatigue  EYES: No eye pain, visual disturbances, or discharge  ENMT:  No difficulty hearing, tinnitus, vertigo; No sinus or throat pain  NECK: No pain or stiffness  RESPIRATORY: No cough, wheezing, chills or hemoptysis; No shortness of breath  CARDIOVASCULAR: No chest pain, palpitations, dizziness, or leg swelling  GASTROINTESTINAL: No abdominal or epigastric pain. No nausea, vomiting, or hematemesis; No diarrhea or constipation. No melena or hematochezia.  GENITOURINARY: No dysuria, frequency, hematuria, or incontinence  NEUROLOGICAL: No headaches, memory loss, loss of strength, numbness, or tremors  SKIN: No itching, burning, rashes, or lesions   MUSCULOSKELETAL: No joint pain or swelling; No muscle, back, or extremity pain  PSYCHIATRIC: No depression, anxiety, mood swings, or difficulty sleeping    All other review of systems negative except as mentioned in HPI.      Social History: 1.5 big bottles of vodka and 6 shots vodka daily. no smoking or illicit drug use      Medications:        LORazepam   Injectable 2 milliGRAM(s) IV Push every 1 hour PRN              dextrose 5% + lactated ringers 1000 milliLiter(s) IV Continuous <Continuous>                ICU Vital Signs Last 24 Hrs  T(C): 37.4 (06 Feb 2019 19:21), Max: 37.4 (06 Feb 2019 19:21)  T(F): 99.4 (06 Feb 2019 19:21), Max: 99.4 (06 Feb 2019 19:21)  HR: 107 (06 Feb 2019 19:21) (107 - 115)  BP: 126/77 (06 Feb 2019 19:21) (126/77 - 155/84)  BP(mean): --  ABP: --  ABP(mean): --  RR: 18 (06 Feb 2019 19:21) (18 - 18)  SpO2: 97% (06 Feb 2019 19:21) (97% - 100%)    Vital Signs Last 24 Hrs  T(C): 37.4 (06 Feb 2019 19:21), Max: 37.4 (06 Feb 2019 19:21)  T(F): 99.4 (06 Feb 2019 19:21), Max: 99.4 (06 Feb 2019 19:21)  HR: 107 (06 Feb 2019 19:21) (107 - 115)  BP: 126/77 (06 Feb 2019 19:21) (126/77 - 155/84)  BP(mean): --  RR: 18 (06 Feb 2019 19:21) (18 - 18)  SpO2: 97% (06 Feb 2019 19:21) (97% - 100%)        I&O's Detail        LABS:                        14.5   8.0   )-----------( 259      ( 06 Feb 2019 16:43 )             42.7     02-06    138  |  94<L>  |  12.0  ----------------------------<  97  4.4   |  28.0  |  0.59    Ca    9.6      06 Feb 2019 16:43  Phos  3.6     02-06  Mg     2.0     02-06    TPro  8.6  /  Alb  4.8  /  TBili  0.6  /  DBili  x   /  AST  63<H>  /  ALT  54<H>  /  AlkPhos  197<H>  02-06          CAPILLARY BLOOD GLUCOSE        PT/INR - ( 06 Feb 2019 16:43 )   PT: 10.9 sec;   INR: 0.95 ratio         PTT - ( 06 Feb 2019 16:43 )  PTT:35.1 sec    CULTURES:      Physical Examination:    GENERAL: No acute distress. resting comfortably in bed.    EYES: Pupils equal, reactive to light.  Symmetric.    EARS, NOSE, THROAT: Normal; supple neck, no lymphadenopathy; trachea midline    PULM: Clear to auscultation bilaterally, no significant sputum production. No use of accessory respiratory muscles.    CVS: Regular rate and rhythm, no murmurs, rubs, or gallops    GI: Soft, nondistended, nontender, normoactive bowel sounds, no masses, no guarding    EXTREMITIES: No edema. DP and radial pulses 2+ bilaterally.    SKIN: Warm and well perfused, no rashes noted.    NEURO: Alert, oriented, interactive, nonfocal. mild tremors.    DEVICES: none    RADIOLOGY: all reviewed. all negative for acute pathology/fractures.

## 2019-02-06 NOTE — CONSULT NOTE ADULT - PROBLEM SELECTOR RECOMMENDATION 9
-librium taper with ativan 2 mg IVP prn. CIWA protocol.  -MV, thiamine, folate.   -LFTs mildly elevated, bilirubin normal. no indication for steroids at this time.

## 2019-02-07 DIAGNOSIS — S62.604A FRACTURE OF UNSPECIFIED PHALANX OF RIGHT RING FINGER, INITIAL ENCOUNTER FOR CLOSED FRACTURE: ICD-10-CM

## 2019-02-07 DIAGNOSIS — F41.9 ANXIETY DISORDER, UNSPECIFIED: ICD-10-CM

## 2019-02-07 DIAGNOSIS — F10.230 ALCOHOL DEPENDENCE WITH WITHDRAWAL, UNCOMPLICATED: ICD-10-CM

## 2019-02-07 DIAGNOSIS — F10.239 ALCOHOL DEPENDENCE WITH WITHDRAWAL, UNSPECIFIED: ICD-10-CM

## 2019-02-07 DIAGNOSIS — Q27.9 CONGENITAL MALFORMATION OF PERIPHERAL VASCULAR SYSTEM, UNSPECIFIED: ICD-10-CM

## 2019-02-07 LAB
AMPHET UR-MCNC: NEGATIVE — SIGNIFICANT CHANGE UP
BARBITURATES UR SCN-MCNC: NEGATIVE — SIGNIFICANT CHANGE UP
BENZODIAZ UR-MCNC: POSITIVE
COCAINE METAB.OTHER UR-MCNC: NEGATIVE — SIGNIFICANT CHANGE UP
METHADONE UR-MCNC: NEGATIVE — SIGNIFICANT CHANGE UP
OPIATES UR-MCNC: NEGATIVE — SIGNIFICANT CHANGE UP
PCP SPEC-MCNC: SIGNIFICANT CHANGE UP
PCP UR-MCNC: NEGATIVE — SIGNIFICANT CHANGE UP
THC UR QL: NEGATIVE — SIGNIFICANT CHANGE UP

## 2019-02-07 PROCEDURE — 99223 1ST HOSP IP/OBS HIGH 75: CPT

## 2019-02-07 PROCEDURE — 99231 SBSQ HOSP IP/OBS SF/LOW 25: CPT

## 2019-02-07 PROCEDURE — 70496 CT ANGIOGRAPHY HEAD: CPT | Mod: 26

## 2019-02-07 RX ORDER — ACETAMINOPHEN 500 MG
650 TABLET ORAL EVERY 6 HOURS
Qty: 0 | Refills: 0 | Status: DISCONTINUED | OUTPATIENT
Start: 2019-02-07 | End: 2019-02-09

## 2019-02-07 RX ORDER — THIAMINE MONONITRATE (VIT B1) 100 MG
100 TABLET ORAL DAILY
Qty: 0 | Refills: 0 | Status: DISCONTINUED | OUTPATIENT
Start: 2019-02-07 | End: 2019-02-09

## 2019-02-07 RX ORDER — FOLIC ACID 0.8 MG
1 TABLET ORAL DAILY
Qty: 0 | Refills: 0 | Status: DISCONTINUED | OUTPATIENT
Start: 2019-02-07 | End: 2019-02-09

## 2019-02-07 RX ADMIN — Medication 650 MILLIGRAM(S): at 20:09

## 2019-02-07 RX ADMIN — Medication 50 MILLIGRAM(S): at 12:00

## 2019-02-07 RX ADMIN — Medication 2 MILLIGRAM(S): at 17:38

## 2019-02-07 RX ADMIN — Medication 2 MILLIGRAM(S): at 12:01

## 2019-02-07 RX ADMIN — Medication 2 MILLIGRAM(S): at 09:45

## 2019-02-07 RX ADMIN — Medication 50 MILLIGRAM(S): at 17:55

## 2019-02-07 RX ADMIN — Medication 2 MILLIGRAM(S): at 15:15

## 2019-02-07 RX ADMIN — Medication 1 MILLIGRAM(S): at 12:00

## 2019-02-07 RX ADMIN — Medication 2 MILLIGRAM(S): at 23:20

## 2019-02-07 RX ADMIN — Medication 100 MILLIGRAM(S): at 12:01

## 2019-02-07 RX ADMIN — Medication 1 TABLET(S): at 12:01

## 2019-02-07 RX ADMIN — Medication 2 MILLIGRAM(S): at 06:44

## 2019-02-07 RX ADMIN — Medication 2 MILLIGRAM(S): at 20:13

## 2019-02-07 RX ADMIN — Medication 650 MILLIGRAM(S): at 21:09
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
## 2019-02-07 NOTE — H&P ADULT - PROBLEM SELECTOR PLAN 2
d/w neurosurgery MRI head w/wo contrast  hold chemical dvt prophylaxis d/w neurosurgery : no need for MRI head  no intervention.

## 2019-02-07 NOTE — H&P ADULT - FAMILY HISTORY
Family history of diabetes mellitus (DM)     Sibling  Still living? Unknown  Family history of anxiety disorder, Age at diagnosis: Age Unknown

## 2019-02-07 NOTE — H&P ADULT - HISTORY OF PRESENT ILLNESS
52 yo F w/ hx ETOH abuse, etoh withdrawal seizures/DT's presents to ER for alcohol detox.  States last drink prior to ER arrival.   Usually drinks 1.5 L bottle wine daily along with few mini bottles of vodka.  Tried detox in past, has been to different facilities in past without success.  Has tried various anti depressants in past but nothing long term to evaluate for effect.  She is unsure if she had falls at home but states she does have frequent falls and blackouts.

## 2019-02-07 NOTE — ED ADULT NURSE REASSESSMENT NOTE - NS ED NURSE REASSESS COMMENT FT1
notified by Dr Barlow that patient will be admitted by next shift patient remains on monitor and resting comfortably

## 2019-02-07 NOTE — ED ADULT NURSE REASSESSMENT NOTE - NS ED NURSE REASSESS COMMENT FT1
report received, assumed pt care at this time, pt resting comfortably I bed, sleeping upon arrival ,easily arousable, states she is feeling better after the ativan. pt updated regarding plan of care. awaiting for bed. MD olivia asked about additional head ct which was order last nigtht, he states it is not necessary at this time.

## 2019-02-07 NOTE — PROGRESS NOTE ADULT - ASSESSMENT
53y Female PMH EtOH abuse, EtOH withdrawal seizures/DT's, frequent falls and blackouts, presents to ED for alcohol detoxification, last drink prior to ED arrival. CT head with linear hyperdensity arising from R periventricular centrum semiovale to frontal cortex, CTA showing R frontal lobe deep venous anomaly  - No focal neurologic deficit    PLAN:  - Will d/w attending  - MRI brain w/wo contrast  - Q4 neuro checks  - Seizure precautions  - CIWA  - Supportive care/further medical management per primary team 53y Female PMH EtOH abuse, EtOH withdrawal seizures/DT's, frequent falls and blackouts, presents to ED for alcohol detoxification, last drink prior to ED arrival. CT head with linear hyperdensity arising from R periventricular centrum semiovale to frontal cortex, CTA showing R frontal lobe deep venous anomaly  - No focal neurologic deficit    PLAN:  - Will d/w attending  - No MRI w/wo contrast needed at this time as CTA shows deep venous anomaly. Patient can follow up with her outpatient neurologist Dr. English and neurosurgery as needed  - Q4 neuro checks  - Seizure precautions  - CIWA  - Supportive care/further medical management per primary team  - Reconsult PRN 53y Female PMH EtOH abuse, EtOH withdrawal seizures/DT's, frequent falls and blackouts, presents to ED for alcohol detoxification, last drink prior to ED arrival. CT head with linear hyperdensity arising from R periventricular centrum semiovale to frontal cortex, CTA showing R frontal lobe deep venous anomaly  - No focal neurologic deficit    PLAN:  - Will d/w attending  - No MRI w/wo contrast needed at this time as CTA shows deep venous anomaly. Patient can follow up with her outpatient neurologist Dr. English.  F/u with neurosurgery prn  - Q4 neuro checks  - Seizure precautions  - CIWA  - Supportive care/further medical management per primary team  - Reconsult PRN

## 2019-02-08 LAB
ALBUMIN SERPL ELPH-MCNC: 4.1 G/DL — SIGNIFICANT CHANGE UP (ref 3.3–5.2)
ALP SERPL-CCNC: 134 U/L — HIGH (ref 40–120)
ALT FLD-CCNC: 31 U/L — SIGNIFICANT CHANGE UP
ANION GAP SERPL CALC-SCNC: 12 MMOL/L — SIGNIFICANT CHANGE UP (ref 5–17)
AST SERPL-CCNC: 34 U/L — HIGH
BILIRUB SERPL-MCNC: 0.3 MG/DL — LOW (ref 0.4–2)
BUN SERPL-MCNC: 9 MG/DL — SIGNIFICANT CHANGE UP (ref 8–20)
CALCIUM SERPL-MCNC: 9.2 MG/DL — SIGNIFICANT CHANGE UP (ref 8.6–10.2)
CHLORIDE SERPL-SCNC: 104 MMOL/L — SIGNIFICANT CHANGE UP (ref 98–107)
CO2 SERPL-SCNC: 25 MMOL/L — SIGNIFICANT CHANGE UP (ref 22–29)
CREAT SERPL-MCNC: 0.45 MG/DL — LOW (ref 0.5–1.3)
GLUCOSE SERPL-MCNC: 103 MG/DL — SIGNIFICANT CHANGE UP (ref 70–115)
HCT VFR BLD CALC: 38.4 % — SIGNIFICANT CHANGE UP (ref 37–47)
HCV AB S/CO SERPL IA: 0.14 S/CO — SIGNIFICANT CHANGE UP
HCV AB SERPL-IMP: SIGNIFICANT CHANGE UP
HGB BLD-MCNC: 12.6 G/DL — SIGNIFICANT CHANGE UP (ref 12–16)
MCHC RBC-ENTMCNC: 32.2 PG — HIGH (ref 27–31)
MCHC RBC-ENTMCNC: 32.8 G/DL — SIGNIFICANT CHANGE UP (ref 32–36)
MCV RBC AUTO: 98.2 FL — SIGNIFICANT CHANGE UP (ref 81–99)
PLATELET # BLD AUTO: 189 K/UL — SIGNIFICANT CHANGE UP (ref 150–400)
POTASSIUM SERPL-MCNC: 3.7 MMOL/L — SIGNIFICANT CHANGE UP (ref 3.5–5.3)
POTASSIUM SERPL-SCNC: 3.7 MMOL/L — SIGNIFICANT CHANGE UP (ref 3.5–5.3)
PROT SERPL-MCNC: 6.9 G/DL — SIGNIFICANT CHANGE UP (ref 6.6–8.7)
RBC # BLD: 3.91 M/UL — LOW (ref 4.4–5.2)
RBC # FLD: 12.7 % — SIGNIFICANT CHANGE UP (ref 11–15.6)
SODIUM SERPL-SCNC: 141 MMOL/L — SIGNIFICANT CHANGE UP (ref 135–145)
WBC # BLD: 3.7 K/UL — LOW (ref 4.8–10.8)
WBC # FLD AUTO: 3.7 K/UL — LOW (ref 4.8–10.8)

## 2019-02-08 PROCEDURE — 99233 SBSQ HOSP IP/OBS HIGH 50: CPT

## 2019-02-08 RX ORDER — ENOXAPARIN SODIUM 100 MG/ML
40 INJECTION SUBCUTANEOUS DAILY
Qty: 0 | Refills: 0 | Status: DISCONTINUED | OUTPATIENT
Start: 2019-02-08 | End: 2019-02-09

## 2019-02-08 RX ADMIN — Medication 50 MILLIGRAM(S): at 12:00

## 2019-02-08 RX ADMIN — Medication 100 MILLIGRAM(S): at 12:00

## 2019-02-08 RX ADMIN — Medication 50 MILLIGRAM(S): at 00:45

## 2019-02-08 RX ADMIN — Medication 1 MILLIGRAM(S): at 12:00

## 2019-02-08 RX ADMIN — Medication 2 MILLIGRAM(S): at 15:02

## 2019-02-08 RX ADMIN — Medication 2 MILLIGRAM(S): at 10:15

## 2019-02-08 RX ADMIN — Medication 2 MILLIGRAM(S): at 20:25

## 2019-02-08 RX ADMIN — Medication 2 MILLIGRAM(S): at 17:04

## 2019-02-08 RX ADMIN — Medication 50 MILLIGRAM(S): at 22:12

## 2019-02-08 RX ADMIN — Medication 2 MILLIGRAM(S): at 06:36

## 2019-02-08 RX ADMIN — Medication 2 MILLIGRAM(S): at 22:21

## 2019-02-08 NOTE — CHART NOTE - NSCHARTNOTEFT_GEN_A_CORE
DVA well demonstrated on vascular imaging.  No indication for MRI brain.   Patient is irritable and requesting psych consult.   No further neurosurgical recommendations. Follow with Neurologist Dr. English as scheduled - patient's regular Neurologist.
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
## 2019-02-09 VITALS — SYSTOLIC BLOOD PRESSURE: 123 MMHG | DIASTOLIC BLOOD PRESSURE: 88 MMHG | TEMPERATURE: 98 F | HEART RATE: 94 BPM

## 2019-02-09 LAB
ALBUMIN SERPL ELPH-MCNC: 4.4 G/DL — SIGNIFICANT CHANGE UP (ref 3.3–5.2)
ALP SERPL-CCNC: 147 U/L — HIGH (ref 40–120)
ALT FLD-CCNC: 40 U/L — HIGH
ANION GAP SERPL CALC-SCNC: 18 MMOL/L — HIGH (ref 5–17)
AST SERPL-CCNC: 46 U/L — HIGH
BASOPHILS # BLD AUTO: 0 K/UL — SIGNIFICANT CHANGE UP (ref 0–0.2)
BASOPHILS NFR BLD AUTO: 0.5 % — SIGNIFICANT CHANGE UP (ref 0–2)
BILIRUB SERPL-MCNC: 0.4 MG/DL — SIGNIFICANT CHANGE UP (ref 0.4–2)
BUN SERPL-MCNC: 9 MG/DL — SIGNIFICANT CHANGE UP (ref 8–20)
CALCIUM SERPL-MCNC: 9.3 MG/DL — SIGNIFICANT CHANGE UP (ref 8.6–10.2)
CHLORIDE SERPL-SCNC: 106 MMOL/L — SIGNIFICANT CHANGE UP (ref 98–107)
CO2 SERPL-SCNC: 21 MMOL/L — LOW (ref 22–29)
CREAT SERPL-MCNC: 0.59 MG/DL — SIGNIFICANT CHANGE UP (ref 0.5–1.3)
EOSINOPHIL # BLD AUTO: 0.1 K/UL — SIGNIFICANT CHANGE UP (ref 0–0.5)
EOSINOPHIL NFR BLD AUTO: 3.6 % — SIGNIFICANT CHANGE UP (ref 0–6)
GLUCOSE SERPL-MCNC: 106 MG/DL — SIGNIFICANT CHANGE UP (ref 70–115)
HCT VFR BLD CALC: 41.3 % — SIGNIFICANT CHANGE UP (ref 37–47)
HGB BLD-MCNC: 13.8 G/DL — SIGNIFICANT CHANGE UP (ref 12–16)
LYMPHOCYTES # BLD AUTO: 1.2 K/UL — SIGNIFICANT CHANGE UP (ref 1–4.8)
LYMPHOCYTES # BLD AUTO: 32.1 % — SIGNIFICANT CHANGE UP (ref 20–55)
MAGNESIUM SERPL-MCNC: 2.1 MG/DL — SIGNIFICANT CHANGE UP (ref 1.8–2.6)
MCHC RBC-ENTMCNC: 33 PG — HIGH (ref 27–31)
MCHC RBC-ENTMCNC: 33.4 G/DL — SIGNIFICANT CHANGE UP (ref 32–36)
MCV RBC AUTO: 98.8 FL — SIGNIFICANT CHANGE UP (ref 81–99)
MONOCYTES # BLD AUTO: 0.2 K/UL — SIGNIFICANT CHANGE UP (ref 0–0.8)
MONOCYTES NFR BLD AUTO: 6.5 % — SIGNIFICANT CHANGE UP (ref 3–10)
NEUTROPHILS # BLD AUTO: 2.2 K/UL — SIGNIFICANT CHANGE UP (ref 1.8–8)
NEUTROPHILS NFR BLD AUTO: 57 % — SIGNIFICANT CHANGE UP (ref 37–73)
PHOSPHATE SERPL-MCNC: 3.8 MG/DL — SIGNIFICANT CHANGE UP (ref 2.4–4.7)
PLATELET # BLD AUTO: 191 K/UL — SIGNIFICANT CHANGE UP (ref 150–400)
POTASSIUM SERPL-MCNC: 3.6 MMOL/L — SIGNIFICANT CHANGE UP (ref 3.5–5.3)
POTASSIUM SERPL-SCNC: 3.6 MMOL/L — SIGNIFICANT CHANGE UP (ref 3.5–5.3)
PROT SERPL-MCNC: 7.7 G/DL — SIGNIFICANT CHANGE UP (ref 6.6–8.7)
RBC # BLD: 4.18 M/UL — LOW (ref 4.4–5.2)
RBC # FLD: 13.1 % — SIGNIFICANT CHANGE UP (ref 11–15.6)
SODIUM SERPL-SCNC: 145 MMOL/L — SIGNIFICANT CHANGE UP (ref 135–145)
WBC # BLD: 3.9 K/UL — LOW (ref 4.8–10.8)
WBC # FLD AUTO: 3.9 K/UL — LOW (ref 4.8–10.8)

## 2019-02-09 PROCEDURE — 36415 COLL VENOUS BLD VENIPUNCTURE: CPT

## 2019-02-09 PROCEDURE — 99232 SBSQ HOSP IP/OBS MODERATE 35: CPT

## 2019-02-09 PROCEDURE — 84702 CHORIONIC GONADOTROPIN TEST: CPT

## 2019-02-09 PROCEDURE — 86803 HEPATITIS C AB TEST: CPT

## 2019-02-09 PROCEDURE — 83735 ASSAY OF MAGNESIUM: CPT

## 2019-02-09 PROCEDURE — 70450 CT HEAD/BRAIN W/O DYE: CPT

## 2019-02-09 PROCEDURE — 96374 THER/PROPH/DIAG INJ IV PUSH: CPT

## 2019-02-09 PROCEDURE — 96361 HYDRATE IV INFUSION ADD-ON: CPT

## 2019-02-09 PROCEDURE — 99285 EMERGENCY DEPT VISIT HI MDM: CPT | Mod: 25

## 2019-02-09 PROCEDURE — 96375 TX/PRO/DX INJ NEW DRUG ADDON: CPT

## 2019-02-09 PROCEDURE — 71046 X-RAY EXAM CHEST 2 VIEWS: CPT

## 2019-02-09 PROCEDURE — 73130 X-RAY EXAM OF HAND: CPT

## 2019-02-09 PROCEDURE — 93005 ELECTROCARDIOGRAM TRACING: CPT

## 2019-02-09 PROCEDURE — 96376 TX/PRO/DX INJ SAME DRUG ADON: CPT

## 2019-02-09 PROCEDURE — 85730 THROMBOPLASTIN TIME PARTIAL: CPT

## 2019-02-09 PROCEDURE — 80053 COMPREHEN METABOLIC PANEL: CPT

## 2019-02-09 PROCEDURE — 85027 COMPLETE CBC AUTOMATED: CPT

## 2019-02-09 PROCEDURE — 80307 DRUG TEST PRSMV CHEM ANLYZR: CPT

## 2019-02-09 PROCEDURE — 73110 X-RAY EXAM OF WRIST: CPT

## 2019-02-09 PROCEDURE — 84100 ASSAY OF PHOSPHORUS: CPT

## 2019-02-09 PROCEDURE — 70496 CT ANGIOGRAPHY HEAD: CPT

## 2019-02-09 PROCEDURE — 85610 PROTHROMBIN TIME: CPT

## 2019-02-09 RX ORDER — ALPRAZOLAM 0.25 MG
0.5 TABLET ORAL THREE TIMES A DAY
Qty: 0 | Refills: 0 | Status: DISCONTINUED | OUTPATIENT
Start: 2019-02-09 | End: 2019-02-09

## 2019-02-09 RX ORDER — THIAMINE MONONITRATE (VIT B1) 100 MG
500 TABLET ORAL EVERY 24 HOURS
Qty: 0 | Refills: 0 | Status: DISCONTINUED | OUTPATIENT
Start: 2019-02-09 | End: 2019-02-09

## 2019-02-09 RX ADMIN — Medication 2 MILLIGRAM(S): at 06:29

## 2019-02-09 RX ADMIN — Medication 100 MILLIGRAM(S): at 12:11

## 2019-02-09 RX ADMIN — Medication 50 MILLIGRAM(S): at 12:12

## 2019-02-09 RX ADMIN — Medication 0.5 MILLIGRAM(S): at 13:49

## 2019-02-09 RX ADMIN — Medication 2 MILLIGRAM(S): at 15:31

## 2019-02-09 RX ADMIN — Medication 50 MILLIGRAM(S): at 06:27

## 2019-02-09 RX ADMIN — Medication 2 MILLIGRAM(S): at 01:32

## 2019-02-09 RX ADMIN — Medication 1 MILLIGRAM(S): at 12:11

## 2019-02-09 RX ADMIN — Medication 2 MILLIGRAM(S): at 09:24

## 2019-02-09 RX ADMIN — Medication 2 MILLIGRAM(S): at 12:11

## 2019-02-09 NOTE — DISCHARGE NOTE ADULT - HOSPITAL COURSE
54 yo F w/ hx ETOH abuse, etoh withdrawal seizures/DT's presents to ER for alcohol detox.  States last drink prior to ER arrival.   Usually drinks 1.5 L bottle wine daily along with few mini bottles of vodka.  Notes a remote history of facial trauma.  right 4th finger fracture  abnormal CT head in ER : A linear hyperdensity arising from the RIGHT periventricular centrum semiovale ovale to the frontal cortex likely indicating venous angioma versus atypical presentation of a linear hemorrhage.    treated for alcohol withdrawal.  outpatient follow up advised by neurosurgery for deep venous anomaly on CTA head.  patient went AMA.  MD was not able to see discuss pros/cons prior to patient leaving.

## 2019-02-09 NOTE — DISCHARGE NOTE ADULT - CARE PLAN
Principal Discharge DX:	Alcohol withdrawal syndrome with complication  Goal:	prevention  Assessment and plan of treatment:	patient went AMA  Secondary Diagnosis:	Anxiety and depression  Secondary Diagnosis:	Closed displaced fracture of phalanx of right ring finger, unspecified phalanx, initial encounter  Secondary Diagnosis:	ETOH abuse  Secondary Diagnosis:	Venous anomaly

## 2019-02-09 NOTE — DISCHARGE NOTE ADULT - PATIENT PORTAL LINK FT
You can access the [facility], offered by [facility], by registering with the following website: [email or web address]

## 2019-02-09 NOTE — DISCHARGE NOTE ADULT - SECONDARY DIAGNOSIS.
Anxiety and depression Closed displaced fracture of phalanx of right ring finger, unspecified phalanx, initial encounter ETOH abuse Venous anomaly

## 2019-02-09 NOTE — PROGRESS NOTE ADULT - SUBJECTIVE AND OBJECTIVE BOX
INTERVAL HPI/OVERNIGHT EVENTS:  53y Female PMH EtOH abuse, EtOH withdrawal seizures/DT's, frequent falls and blackouts, presents to ED for alcohol detoxification, last drink prior to ED arrival. CT head with linear hyperdensity arising from R periventricular centrum semiovale to frontal cortex, CTA showing R frontal lobe deep venous anomaly. Patient seen in ED, stable. MRI w/wo contrast pending    Vital Signs Last 24 Hrs  T(C): 37.2 (07 Feb 2019 11:22), Max: 37.4 (06 Feb 2019 19:21)  T(F): 99 (07 Feb 2019 11:22), Max: 99.4 (06 Feb 2019 19:21)  HR: 85 (07 Feb 2019 11:22) (79 - 115)  BP: 127/90 (07 Feb 2019 11:22) (112/77 - 155/84)  BP(mean): --  RR: 18 (07 Feb 2019 11:22) (17 - 18)  SpO2: 100% (07 Feb 2019 11:22) (97% - 100%)    PHYSICAL EXAM:  GENERAL: Anxious. Well groomed, well developed  HEAD:  Atraumatic, normocephalic  MENTAL STATUS: AAO x3; Appropriately conversant without aphasia; following commands  CRANIAL NERVES: PERRL. EOMI. Face symmetric. Hearing grossly intact. Speech clear  MOTOR: [place] spontaneously and to command  CHEST/LUNG: Nonlabored breathing, no respiratory distress    LABS:                        14.5   8.0   )-----------( 259      ( 06 Feb 2019 16:43 )             42.7     02-06    138  |  94<L>  |  12.0  ----------------------------<  97  4.4   |  28.0  |  0.59    Ca    9.6      06 Feb 2019 16:43  Phos  3.6     02-06  Mg     2.0     02-06    TPro  8.6  /  Alb  4.8  /  TBili  0.6  /  DBili  x   /  AST  63<H>  /  ALT  54<H>  /  AlkPhos  197<H>  02-06    PT/INR - ( 06 Feb 2019 16:43 )   PT: 10.9 sec;   INR: 0.95 ratio      PTT - ( 06 Feb 2019 16:43 )  PTT:35.1 sec    RADIOLOGY & ADDITIONAL TESTS:  CT Angio Head w/ IV Cont (02.07.19 @ 00:45)  IMPRESSION:  Head CT: Stable exam. No acute intracranial hemorrhage or calvarial   fracture. Right frontal lobe linear hyperdensity which extends beyond the   cortex likely representing a venous anomaly.  CTA head: Right frontal lobe DVA. No major vessel occlusion,   hemodynamically significant stenosis or aneurysm.    CT Head No Cont (02.06.19 @ 19:51)  IMPRESSION:  A linear hyperdensity arising from the RIGHT periventricular centrum   semiovale ovale to the frontal cortex likely indicating venous angioma   versus atypical presentation of a linear hemorrhage . hemorrhage is less   likely due to unchanged appearance over 30 days compared to prior exam   dated 1/5/2019.. However finding was not seen on prior CT scan dated   6/29/2012.. In light of trauma with neurological symptoms a follow-up MRI   recommended..  Remaining brain parenchyma unremarkable. .
Alcohol dependence with uncomplicated withdrawal    HPI:  52 yo F w/ hx ETOH abuse, etoh withdrawal seizures/DT's presents to ER for alcohol detox.  States last drink prior to ER arrival.   Usually drinks 1.5 L bottle wine daily along with few mini bottles of vodka.  Tried detox in past, has been to different facilities in past without success.  Has tried various anti depressants in past but nothing long term to evaluate for effect.  She is unsure if she had falls at home but states she does have frequent falls and blackouts. (07 Feb 2019 08:45)      Interval History:  Patient was seen and examined at bedside around 8 am. Very anxious. Complaining of headache and tremors.  Denies chest pain, palpitations, shortness of breath, dizziness, visual symptoms, hallucinations, nausea, vomiting or abdominal pain.    ROS:  As per interval history otherwise unremarkable.    PHYSICAL EXAM:  Vital Signs  T(C): 37.2 (08 Feb 2019 16:16), Max: 37.2 (07 Feb 2019 20:01)  T(F): 98.9 (08 Feb 2019 16:16), Max: 99 (07 Feb 2019 20:01)  HR: 80 (08 Feb 2019 16:16) (75 - 100)  BP: 128/90 (08 Feb 2019 16:16) (119/81 - 146/92)  RR: 18 (08 Feb 2019 10:52) (18 - 18)  SpO2: 96% (08 Feb 2019 06:30) (88% - 99%)  General: Well developed. Well nourished. No acute distress  HEENT: PERRLA. EOMI. Clear conjunctivae. Moist mucus membrane  Neck: Supple. No JVD. No Thyromegaly   Chest: CTA bilaterally - no wheezing, rales or rhonchi.   Heart: Normal S1 & S2. RRR. No murmur.   Abdomen: Soft. Non-tender. Non-distended. + BS  Ext: No pedal edema. No calf tenderness   Neuro: AAO x 3. No focal deficit. No speech disorder. Tremors in hands.   Skin: Warm and Dry  Psychiatry: Anxiuos    I&O's Summary    08 Feb 2019 07:01  -  08 Feb 2019 19:50  --------------------------------------------------------  IN: 240 mL / OUT: 0 mL / NET: 240 mL    MEDICATIONS  (STANDING):  chlordiazePOXIDE 50 milliGRAM(s) Oral every 8 hours  chlordiazePOXIDE   Oral   folic acid 1 milliGRAM(s) Oral daily  multivitamin 1 Tablet(s) Oral daily  thiamine 100 milliGRAM(s) Oral daily    MEDICATIONS  (PRN):  acetaminophen   Tablet .. 650 milliGRAM(s) Oral every 6 hours PRN Temp greater or equal to 38C (100.4F), Mild Pain (1 - 3), Moderate Pain (4 - 6)  LORazepam   Injectable 2 milliGRAM(s) IV Push every 1 hour PRN CIWA-Ar score 8 or greater  LORazepam   Injectable 2 milliGRAM(s) IV Push every 1 hour PRN Symptom-triggered: each CIWA -Ar score 8 or GREATER      LABS:                        12.6   3.7   )-----------( 189      ( 08 Feb 2019 07:00 )             38.4     02-08    141  |  104  |  9.0  ----------------------------<  103  3.7   |  25.0  |  0.45<L>    Ca    9.2      08 Feb 2019 07:00    TPro  6.9  /  Alb  4.1  /  TBili  0.3<L>  /  DBili  x   /  AST  34<H>  /  ALT  31  /  AlkPhos  134<H>  02-08      RADIOLOGY & ADDITIONAL STUDIES:  Reviewed
Spoke with Dr Sanders. Patient is on Librium taper for alcohol detox and requested to speak with psychiatry for anxiety. Patient refused to speak with writer and states she will wait until am to speak with a psychiatrist.
seen for ETOH withdrawal    agitated at times, CIWA at 8, ativan given often  confused....states shes been in hospital 5 days  ros limited as fixed on Length of stay.      MEDICATIONS  (STANDING):  chlordiazePOXIDE 50 milliGRAM(s) Oral three times a day  enoxaparin Injectable 40 milliGRAM(s) SubCutaneous daily  folic acid 1 milliGRAM(s) Oral daily  multivitamin 1 Tablet(s) Oral daily  thiamine 100 milliGRAM(s) Oral daily    MEDICATIONS  (PRN):  acetaminophen   Tablet .. 650 milliGRAM(s) Oral every 6 hours PRN Temp greater or equal to 38C (100.4F), Mild Pain (1 - 3), Moderate Pain (4 - 6)  ALPRAZolam 0.5 milliGRAM(s) Oral three times a day PRN anxiety  LORazepam   Injectable 2 milliGRAM(s) IV Push every 1 hour PRN CIWA-Ar score 8 or greater  LORazepam   Injectable 2 milliGRAM(s) IV Push every 1 hour PRN Symptom-triggered: each CIWA -Ar score 8 or GREATER      Allergies    No Known Allergies    Vital Signs Last 24 Hrs  T(C): 36.8 (09 Feb 2019 12:27), Max: 37.2 (08 Feb 2019 16:16)  T(F): 98.2 (09 Feb 2019 12:27), Max: 98.9 (08 Feb 2019 16:16)  HR: 78 (09 Feb 2019 12:27) (78 - 87)  BP: 168/83 (09 Feb 2019 12:27) (112/77 - 168/83)  BP(mean): --  RR: 18 (09 Feb 2019 12:27) (18 - 18)  SpO2: 98% (08 Feb 2019 20:20) (98% - 98%)    PHYSICAL EXAM:    GENERAL: NAD  CHEST/LUNG: ctab  HEART: Regular rate and rhythm; S1 S2  ABDOMEN: Soft, Nontender, Bowel sounds present  EXTREMITIES: no edema  NERVOUS SYSTEM: awake, alert, nonfocal neuro. + tremors      LABS:                        13.8   3.9   )-----------( 191      ( 09 Feb 2019 07:48 )             41.3     02-09    145  |  106  |  9.0  ----------------------------<  106  3.6   |  21.0<L>  |  0.59    Ca    9.3      09 Feb 2019 07:48  Phos  3.8     02-09  Mg     2.1     02-09    TPro  7.7  /  Alb  4.4  /  TBili  0.4  /  DBili  x   /  AST  46<H>  /  ALT  40<H>  /  AlkPhos  147<H>  02-09          CAPILLARY BLOOD GLUCOSE            RADIOLOGY & ADDITIONAL TESTS:
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
## 2019-02-13 ENCOUNTER — EMERGENCY (EMERGENCY)
Facility: HOSPITAL | Age: 54
LOS: 0 days | Discharge: LEFT BEFORE DISCHARGE | End: 2019-02-13
Attending: EMERGENCY MEDICINE | Admitting: EMERGENCY MEDICINE

## 2019-02-13 VITALS
HEART RATE: 73 BPM | DIASTOLIC BLOOD PRESSURE: 79 MMHG | RESPIRATION RATE: 18 BRPM | TEMPERATURE: 98 F | SYSTOLIC BLOOD PRESSURE: 115 MMHG | WEIGHT: 121.03 LBS | OXYGEN SATURATION: 100 % | HEIGHT: 62 IN

## 2019-02-13 DIAGNOSIS — R69 ILLNESS, UNSPECIFIED: ICD-10-CM

## 2019-02-13 NOTE — ED ADULT TRIAGE NOTE - CHIEF COMPLAINT QUOTE
pt brought by EMS from home for eval of intoxication. states she had "only about 3 or 4 shots of alcohol".

## 2019-02-13 NOTE — ED ADULT NURSE NOTE - NSIMPLEMENTINTERV_GEN_ALL_ED
Implemented All Fall with Harm Risk Interventions:  [place] to call system. Call bell, personal items and telephone within reach. Instruct patient to call for assistance. Room bathroom lighting operational. Non-slip footwear when patient is off stretcher. Physically safe environment: no spills, clutter or unnecessary equipment. Stretcher in lowest position, wheels locked, appropriate side rails in place. Provide visual cue, wrist band, yellow gown, etc. Monitor gait and stability. Monitor for mental status changes and reorient to person, place, and time. Review medications for side effects contributing to fall risk. Reinforce activity limits and safety measures with patient and family. Provide visual clues: red socks.
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
## 2019-02-26 ENCOUNTER — EMERGENCY (EMERGENCY)
Facility: HOSPITAL | Age: 54
LOS: 0 days | Discharge: HOME | End: 2019-02-26
Attending: STUDENT IN AN ORGANIZED HEALTH CARE EDUCATION/TRAINING PROGRAM | Admitting: STUDENT IN AN ORGANIZED HEALTH CARE EDUCATION/TRAINING PROGRAM
Payer: MEDICAID

## 2019-02-26 VITALS
HEART RATE: 103 BPM | WEIGHT: 149.91 LBS | SYSTOLIC BLOOD PRESSURE: 138 MMHG | RESPIRATION RATE: 18 BRPM | DIASTOLIC BLOOD PRESSURE: 88 MMHG | TEMPERATURE: 98 F | OXYGEN SATURATION: 100 %

## 2019-02-26 DIAGNOSIS — F10.10 ALCOHOL ABUSE, UNCOMPLICATED: ICD-10-CM

## 2019-02-26 DIAGNOSIS — I10 ESSENTIAL (PRIMARY) HYPERTENSION: ICD-10-CM

## 2019-02-26 DIAGNOSIS — F41.9 ANXIETY DISORDER, UNSPECIFIED: ICD-10-CM

## 2019-02-26 DIAGNOSIS — F10.129 ALCOHOL ABUSE WITH INTOXICATION, UNSPECIFIED: ICD-10-CM

## 2019-02-26 PROCEDURE — 99284 EMERGENCY DEPT VISIT MOD MDM: CPT

## 2019-02-26 NOTE — ED PROVIDER NOTE - OBJECTIVE STATEMENT
54 y/o female with a PMHx of anxiety, EtOH abuse, HTN presents to the ED regarding EtOH intoxication. Pt states she drinks daily and had her last alcohol drink yesterday at 21:00 due to an attempt to self detox. Today pt notes shaking so called EMS to bring pt to hospital. Denies fall or trauma. No SI, HI.

## 2019-02-26 NOTE — ED ADULT NURSE NOTE - OBJECTIVE STATEMENT
Pt presents with ETOH, ambulating at this time, asking to leave.  Pt currently on phone with daughter who states "I will pick my mother up". Dr Gautam aware

## 2019-02-26 NOTE — ED ADULT NURSE NOTE - CHPI ED NUR SYMPTOMS NEG
no pain/no nausea/no vomiting/no chills/no dizziness/no fever/no tingling/no decreased eating/drinking/no weakness

## 2019-02-26 NOTE — ED ADULT NURSE NOTE - NSIMPLEMENTINTERV_GEN_ALL_ED
Implemented All Universal Safety Interventions:  [place] to call system. Call bell, personal items and telephone within reach. Instruct patient to call for assistance. Room bathroom lighting operational. Non-slip footwear when patient is off stretcher. Physically safe environment: no spills, clutter or unnecessary equipment. Stretcher in lowest position, wheels locked, appropriate side rails in place.

## 2019-02-26 NOTE — ED PROVIDER NOTE - CONSTITUTIONAL, MLM
normal... Well appearing, well nourished, awake, alert, oriented to person, place, time/situation. +mild emotional distress.

## 2019-02-26 NOTE — ED PROVIDER NOTE - PROGRESS NOTE DETAILS
Jaciel LAFLEUR for ED attending, Dr. Gautam: Pt refusing blood work at this time. Reports that her daughter will come to pick her up and assume care. Charge nurse spoke to daughter who is on her way. Jaciel LAFLEUR for ED attending, Dr. Gautam: Social work came to bedside to evaluate pt and pt refusing inpatient or outpatient resources. Lotus DO: Patient noticed to walk out of ED with steady gait; clinically sober at this time.

## 2019-03-19 ENCOUNTER — EMERGENCY (EMERGENCY)
Facility: HOSPITAL | Age: 54
LOS: 0 days | Discharge: HOME | End: 2019-03-19
Attending: STUDENT IN AN ORGANIZED HEALTH CARE EDUCATION/TRAINING PROGRAM | Admitting: STUDENT IN AN ORGANIZED HEALTH CARE EDUCATION/TRAINING PROGRAM
Payer: MEDICAID

## 2019-03-19 VITALS
HEART RATE: 105 BPM | DIASTOLIC BLOOD PRESSURE: 90 MMHG | SYSTOLIC BLOOD PRESSURE: 144 MMHG | RESPIRATION RATE: 16 BRPM | OXYGEN SATURATION: 96 % | WEIGHT: 160.06 LBS | HEIGHT: 65 IN

## 2019-03-19 DIAGNOSIS — F41.9 ANXIETY DISORDER, UNSPECIFIED: ICD-10-CM

## 2019-03-19 DIAGNOSIS — I10 ESSENTIAL (PRIMARY) HYPERTENSION: ICD-10-CM

## 2019-03-19 DIAGNOSIS — F10.129 ALCOHOL ABUSE WITH INTOXICATION, UNSPECIFIED: ICD-10-CM

## 2019-03-19 PROCEDURE — 99283 EMERGENCY DEPT VISIT LOW MDM: CPT

## 2019-03-19 NOTE — ED PROVIDER NOTE - OBJECTIVE STATEMENT
52 y/o F with PMHx of EtOH Abuse, Anxiety, and HTN presenting to the ED via EMS for EtOH intoxication. While in ED, pt states she has had a "hard couple of days." Denies any SI or HI. Unable to obtain full HPI secondary to pt's current alcohol intoxication.

## 2019-03-19 NOTE — ED ADULT NURSE NOTE - OBJECTIVE STATEMENT
patient comes in intoxicated. Patient A&Ox3. No signs of acute distress noted. Patient skin appropriate to race. Patient well perfused. Will continue to monitor.

## 2019-03-19 NOTE — ED ADULT NURSE NOTE - NSIMPLEMENTINTERV_GEN_ALL_ED
Implemented All Fall Risk Interventions:  [place] to call system. Call bell, personal items and telephone within reach. Instruct patient to call for assistance. Room bathroom lighting operational. Non-slip footwear when patient is off stretcher. Physically safe environment: no spills, clutter or unnecessary equipment. Stretcher in lowest position, wheels locked, appropriate side rails in place. Provide visual cue, wrist band, yellow gown, etc. Monitor gait and stability. Monitor for mental status changes and reorient to person, place, and time. Review medications for side effects contributing to fall risk. Reinforce activity limits and safety measures with patient and family.

## 2019-03-19 NOTE — ED PROVIDER NOTE - PROGRESS NOTE DETAILS
[place] DO: Patient with no SI, HI; clinically sober at this time; instructed to f/u with pmd in 1-2 days without fail; gait steady in ED; strict return precautions given.

## 2019-03-21 ENCOUNTER — EMERGENCY (EMERGENCY)
Facility: HOSPITAL | Age: 54
LOS: 0 days | Discharge: HOME | End: 2019-03-21
Attending: EMERGENCY MEDICINE | Admitting: EMERGENCY MEDICINE
Payer: MEDICAID

## 2019-03-21 VITALS
HEART RATE: 106 BPM | RESPIRATION RATE: 18 BRPM | WEIGHT: 121.92 LBS | DIASTOLIC BLOOD PRESSURE: 95 MMHG | SYSTOLIC BLOOD PRESSURE: 144 MMHG | OXYGEN SATURATION: 97 % | TEMPERATURE: 98 F

## 2019-03-21 VITALS
SYSTOLIC BLOOD PRESSURE: 134 MMHG | TEMPERATURE: 98 F | OXYGEN SATURATION: 97 % | DIASTOLIC BLOOD PRESSURE: 80 MMHG | RESPIRATION RATE: 18 BRPM | HEART RATE: 90 BPM
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
## 2019-03-21 DIAGNOSIS — F10.129 ALCOHOL ABUSE WITH INTOXICATION, UNSPECIFIED: ICD-10-CM

## 2019-03-21 DIAGNOSIS — F41.9 ANXIETY DISORDER, UNSPECIFIED: ICD-10-CM

## 2019-03-21 DIAGNOSIS — Z86.79 PERSONAL HISTORY OF OTHER DISEASES OF THE CIRCULATORY SYSTEM: ICD-10-CM

## 2019-03-21 DIAGNOSIS — I10 ESSENTIAL (PRIMARY) HYPERTENSION: ICD-10-CM

## 2019-03-21 DIAGNOSIS — Z79.899 OTHER LONG TERM (CURRENT) DRUG THERAPY: ICD-10-CM

## 2019-03-21 LAB
ALBUMIN SERPL ELPH-MCNC: 4.7 G/DL — SIGNIFICANT CHANGE UP (ref 3.3–5)
ALP SERPL-CCNC: 216 U/L — HIGH (ref 40–120)
ALT FLD-CCNC: 119 U/L — HIGH (ref 12–78)
ANION GAP SERPL CALC-SCNC: 11 MMOL/L — SIGNIFICANT CHANGE UP (ref 5–17)
AST SERPL-CCNC: 196 U/L — HIGH (ref 15–37)
BASOPHILS # BLD AUTO: 0.03 K/UL — SIGNIFICANT CHANGE UP (ref 0–0.2)
BASOPHILS NFR BLD AUTO: 0.7 % — SIGNIFICANT CHANGE UP (ref 0–2)
BILIRUB SERPL-MCNC: 0.4 MG/DL — SIGNIFICANT CHANGE UP (ref 0.2–1.2)
BUN SERPL-MCNC: 8 MG/DL — SIGNIFICANT CHANGE UP (ref 7–23)
CALCIUM SERPL-MCNC: 8.7 MG/DL — SIGNIFICANT CHANGE UP (ref 8.5–10.1)
CHLORIDE SERPL-SCNC: 99 MMOL/L — SIGNIFICANT CHANGE UP (ref 96–108)
CO2 SERPL-SCNC: 26 MMOL/L — SIGNIFICANT CHANGE UP (ref 22–31)
CREAT SERPL-MCNC: 0.66 MG/DL — SIGNIFICANT CHANGE UP (ref 0.5–1.3)
EOSINOPHIL # BLD AUTO: 0.02 K/UL — SIGNIFICANT CHANGE UP (ref 0–0.5)
EOSINOPHIL NFR BLD AUTO: 0.5 % — SIGNIFICANT CHANGE UP (ref 0–6)
ETHANOL SERPL-MCNC: 377 MG/DL — HIGH (ref 0–10)
GLUCOSE SERPL-MCNC: 92 MG/DL — SIGNIFICANT CHANGE UP (ref 70–99)
HCT VFR BLD CALC: 42.9 % — SIGNIFICANT CHANGE UP (ref 34.5–45)
HGB BLD-MCNC: 14.5 G/DL — SIGNIFICANT CHANGE UP (ref 11.5–15.5)
IMM GRANULOCYTES NFR BLD AUTO: 0.2 % — SIGNIFICANT CHANGE UP (ref 0–1.5)
LYMPHOCYTES # BLD AUTO: 1.42 K/UL — SIGNIFICANT CHANGE UP (ref 1–3.3)
LYMPHOCYTES # BLD AUTO: 32.2 % — SIGNIFICANT CHANGE UP (ref 13–44)
MCHC RBC-ENTMCNC: 33.3 PG — SIGNIFICANT CHANGE UP (ref 27–34)
MCHC RBC-ENTMCNC: 33.8 GM/DL — SIGNIFICANT CHANGE UP (ref 32–36)
MCV RBC AUTO: 98.6 FL — SIGNIFICANT CHANGE UP (ref 80–100)
MONOCYTES # BLD AUTO: 0.37 K/UL — SIGNIFICANT CHANGE UP (ref 0–0.9)
MONOCYTES NFR BLD AUTO: 8.4 % — SIGNIFICANT CHANGE UP (ref 2–14)
NEUTROPHILS # BLD AUTO: 2.56 K/UL — SIGNIFICANT CHANGE UP (ref 1.8–7.4)
NEUTROPHILS NFR BLD AUTO: 58 % — SIGNIFICANT CHANGE UP (ref 43–77)
NRBC # BLD: 0 /100 WBCS — SIGNIFICANT CHANGE UP (ref 0–0)
PLATELET # BLD AUTO: 172 K/UL — SIGNIFICANT CHANGE UP (ref 150–400)
POTASSIUM SERPL-MCNC: 3.8 MMOL/L — SIGNIFICANT CHANGE UP (ref 3.5–5.3)
POTASSIUM SERPL-SCNC: 3.8 MMOL/L — SIGNIFICANT CHANGE UP (ref 3.5–5.3)
PROT SERPL-MCNC: 9 GM/DL — HIGH (ref 6–8.3)
RBC # BLD: 4.35 M/UL — SIGNIFICANT CHANGE UP (ref 3.8–5.2)
RBC # FLD: 13.2 % — SIGNIFICANT CHANGE UP (ref 10.3–14.5)
SODIUM SERPL-SCNC: 136 MMOL/L — SIGNIFICANT CHANGE UP (ref 135–145)
WBC # BLD: 4.41 K/UL — SIGNIFICANT CHANGE UP (ref 3.8–10.5)
WBC # FLD AUTO: 4.41 K/UL — SIGNIFICANT CHANGE UP (ref 3.8–10.5)

## 2019-03-21 PROCEDURE — 99285 EMERGENCY DEPT VISIT HI MDM: CPT

## 2019-03-21 RX ORDER — THIAMINE MONONITRATE (VIT B1) 100 MG
100 TABLET ORAL ONCE
Qty: 0 | Refills: 0 | Status: COMPLETED | OUTPATIENT
Start: 2019-03-21 | End: 2019-03-21

## 2019-03-21 RX ORDER — FOLIC ACID 0.8 MG
1 TABLET ORAL ONCE
Qty: 0 | Refills: 0 | Status: COMPLETED | OUTPATIENT
Start: 2019-03-21 | End: 2019-03-21

## 2019-03-21 NOTE — ED PROVIDER NOTE - CLINICAL SUMMARY MEDICAL DECISION MAKING FREE TEXT BOX
Patient initially requesting detox, but now states she wants to leave.  Ambulatory in ED without ataxia.  Okay for d/c home with family.

## 2019-03-21 NOTE — ED PROVIDER NOTE - PROGRESS NOTE DETAILS
Patient states she is an alcoholic, she would like detox for alcoholism.  Pt currently drunk.  Spoke with SW who will likely not be able to assess patient tonight as is currently intoxicated.  Plan for labs, observation, possible SW in morning for detox/rehab from ED.  Porfirio Sung D.O.

## 2019-03-21 NOTE — ED PROVIDER NOTE - CONSTITUTIONAL, MLM
+intoxicated, well nourished, awake, alert, oriented to person, place, time/situation and in no apparent distress. normal...

## 2019-03-21 NOTE — ED PROVIDER NOTE - OBJECTIVE STATEMENT
52 y/o f with PMHx of anxiety, EtOH abuse, HTN, subdural hematoma presenting to the ED BIBA for EtOH intoxication. Pt states she drank "a lot of wine" today. Seen in [facility] for same yesterday. Pt unable to provide full hx secondary to EtOH intoxication. Denies fever, chills, n/v/d, HA, dizziness, SOB, CP. Denies SI, HI.

## 2019-03-21 NOTE — ED ADULT NURSE NOTE - NSIMPLEMENTINTERV_GEN_ALL_ED
Implemented All Fall Risk Interventions:  [place] to call system. Call bell, personal items and telephone within reach. Instruct patient to call for assistance. Room bathroom lighting operational. Non-slip footwear when patient is off stretcher. Physically safe environment: no spills, clutter or unnecessary equipment. Stretcher in lowest position, wheels locked, appropriate side rails in place. Provide visual cue, wrist band, yellow gown, etc. Monitor gait and stability. Monitor for mental status changes and reorient to person, place, and time. Review medications for side effects contributing to fall risk. Reinforce activity limits and safety measures with patient and family.

## 2019-06-14 NOTE — H&P ADULT - NSTOBACCOSCREENHP_GEN_A_NCS
[facility]  Crow Olivas M.D.  [address]  [place] [address]  Fax: [phone]   Authorization for Release/Disclosure of   Protected Health Information   Name: CHUCHO CARMONA : 1941 SSN: [id number]   Address: [address] [place] [address] [place] [address] Phone:    [phone] (home)    I authorize the entity listed below to release/disclose the PHI below to:   [facility]/Crow Olivas M.D. and Crow Olivas M.D.   Provider or Entity Name:  Nitesh Young M.D.   Address   City, State, Zip   Phone:  [phone]    Fax:  [phone]   Reason for request: continuity of care   Information to be released:    [  ] LAST COLONOSCOPY,  including any PATH REPORT and follow-up  [  ] LAST FIT/COLOGUARD RESULT [  ] LAST DEXA  [  ] LAST MAMMOGRAM  [  ] LAST PAP  [  ] LAST LABS [XXX] RETINA EXAM REPORT  [  ] IMMUNIZATION RECORDS  [  ] Release all info      [  ] Check here and initial the line next to each item to release ALL health information INCLUDING  _____ Care and treatment for drug and / or alcohol abuse  _____ HIV testing, infection status, or AIDS  _____ Genetic Testing    DATES OF SERVICE OR TIME PERIOD TO BE DISCLOSED: _____________  I understand and acknowledge that:  * This Authorization may be revoked at any time by you in writing, except if your health information has already been used or disclosed.  * Your health information that will be used or disclosed as a result of you signing this authorization could be re-disclosed by the recipient. If this occurs, your re-disclosed health information may no longer be protected by State or Federal laws.  * You may refuse to sign this Authorization. Your refusal will not affect your ability to obtain treatment.  * This Authorization becomes effective upon signing and will  on (date) __________.      If no date is indicated, this Authorization will  one (1) year from the signature date.    Name: Chucho Cabrera  Ana Rosa    Signature:continuity of care   Date:     6/14/2019       PLEASE FAX REQUESTED RECORDS BACK TO: [phone]   No

## 2019-07-04 ENCOUNTER — EMERGENCY (EMERGENCY)
Facility: HOSPITAL | Age: 54
LOS: 0 days | Discharge: HOME | End: 2019-07-04
Attending: EMERGENCY MEDICINE | Admitting: EMERGENCY MEDICINE
Payer: MEDICAID

## 2019-07-04 VITALS
HEIGHT: 62 IN | WEIGHT: 128.97 LBS | OXYGEN SATURATION: 100 % | RESPIRATION RATE: 18 BRPM | DIASTOLIC BLOOD PRESSURE: 92 MMHG | HEART RATE: 107 BPM | SYSTOLIC BLOOD PRESSURE: 137 MMHG | TEMPERATURE: 98 F

## 2019-07-04 DIAGNOSIS — Z86.79 PERSONAL HISTORY OF OTHER DISEASES OF THE CIRCULATORY SYSTEM: ICD-10-CM

## 2019-07-04 DIAGNOSIS — Z81.8 FAMILY HISTORY OF OTHER MENTAL AND BEHAVIORAL DISORDERS: ICD-10-CM

## 2019-07-04 DIAGNOSIS — Z83.3 FAMILY HISTORY OF DIABETES MELLITUS: ICD-10-CM

## 2019-07-04 DIAGNOSIS — F10.10 ALCOHOL ABUSE, UNCOMPLICATED: ICD-10-CM
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
## 2019-07-04 DIAGNOSIS — F41.9 ANXIETY DISORDER, UNSPECIFIED: ICD-10-CM

## 2019-07-04 DIAGNOSIS — I10 ESSENTIAL (PRIMARY) HYPERTENSION: ICD-10-CM

## 2019-07-04 PROCEDURE — 99282 EMERGENCY DEPT VISIT SF MDM: CPT

## 2019-07-04 NOTE — ED PROVIDER NOTE - PSYCHIATRIC, MLM
Alert and oriented to person, place, time/situation. no apparent risk to self or others. Pt is tearful at bedside.

## 2019-07-04 NOTE — ED PROVIDER NOTE - NSFOLLOWUPINSTRUCTIONS_ED_ALL_ED_FT
Alcohol Use Disorder    WHAT YOU NEED TO KNOW:    Alcohol use disorder (AUD) is problem drinking. AUD includes alcohol abuse and alcohol dependency.     DISCHARGE INSTRUCTIONS:    Seek care immediately if:     Your heart is beating faster than usual.      You have hallucinations.      You cannot remember what happens while you are drinking.      You have seizures.    Contact your healthcare provider if:     You are anxious and have nausea.      Your hands are shaky and you are sweating heavily.      You have questions or concerns about your condition or care.    Follow up with your healthcare provider as directed: Do not try to stop drinking on your own. Your healthcare provider may need to help you withdraw from alcohol safely. He may need to admit you to the hospital. You may also need any of the following treatments:    Medicines to decrease your craving for alcohol      Support groups such as Alcoholics Anonymous       Therapy from a psychiatrist or psychologist       Admission to an inpatient facility for treatment for severe AUD    Interested in discussing options to reduce your alcohol or drug use?      [facility]: [phone]   [facility] Substance Abuse Services: [phone], option #2   Methadone Maintenance & Ambulatory Opiate Detox: [phone]  [facility]: [phone]  [facility]: [phone]  [facility]: [phone]    [facility]: [phone], option #2   [facility]: [phone]    [facility]: [phone]    [facility] Intake: [phone]  [facility] Chemical Dependency/Ancillary Withdrawal: [phone]  [facility] Methadone Maintenance: [phone]    [facility]: [phone]  [facility]: [phone]    [facility]: 3-438-7-HOPENY    [facility] Office of Alcoholism and Substance Abuse Services (OASAS): https://www.oasas.[place].gov/providerdirectory/  [facility] for Addiction Services and Psychotherapy Interventions Research (CASPIR)  www.[facility].org     Interested in discussing options to reduce your tobacco use?    [facility] for Tobacco Control:  [phone]  [facility] QUITLINE: 7-324-UU-QUITS ([phone])    Interested in learning more about substance use?      http://rethinkingdrinking.niaaa.nih.gov   https://www.drugabuse.gov/patients-families     Learn more about opioid overdose prevention programs in [facility]:  http://www.[facility].ny.[facility]/diseases/aids/general/opioid_overdose_prevention/

## 2019-07-04 NOTE — ED ADULT NURSE NOTE - CAS EDN DISCHARGE ASSESSMENT
Alert and oriented to person, place and time/Awake/ambulatory w/ steady gait. eating sandwich upon discharge

## 2019-07-04 NOTE — ED ADULT NURSE NOTE - CHPI ED NUR SYMPTOMS NEG
no headache/no back pain/no dizziness/no fever/no loss of consciousness/no nausea/no decreased eating/drinking/no chills/no pain/no vomiting

## 2019-07-04 NOTE — ED PROVIDER NOTE - OBJECTIVE STATEMENT
54 y/o female with a PMHx of anxiety, HTN, SDH, and ETOH abuse presents to the ED for evaluation of ETOH abuse and requesting "help for alcoholism." Pt is crying at bedside and saying "I want to go home." Pt says she has gone to get help for her alcoholism multiple times but without success. Pt admits to drinking vodka today and says she started drinking vodka this month because "Chardonnay is not enough to stop the shakes." Pt denies SI.

## 2019-07-04 NOTE — ED ADULT NURSE NOTE - OBJECTIVE STATEMENT
pt is a 54 y/o female w/ pmhx of ETOH abuse, presenting for eval of intoxication states that she was very stressed out today and drank vodka today and came to ED. pt denies SI HI n/v tremors or hallucinations. pt offered detox and declined, stating that she has tried many times before but has not been successful.

## 2019-07-04 NOTE — ED ADULT TRIAGE NOTE - CHIEF COMPLAINT QUOTE
Pt came to the ed for evaluation of etoh abuse and requesting "help for alcoholism". Pt admits to drinking a few mixed drinks today. States as of recently she's been drinking more. No SI reported. Pt A+Ox3.

## 2019-07-04 NOTE — ED ADULT NURSE NOTE - NSIMPLEMENTINTERV_GEN_ALL_ED
Implemented All Fall Risk Interventions:  [place] to call system. Call bell, personal items and telephone within reach. Instruct patient to call for assistance. Room bathroom lighting operational. Non-slip footwear when patient is off stretcher. Physically safe environment: no spills, clutter or unnecessary equipment. Stretcher in lowest position, wheels locked, appropriate side rails in place. Provide visual cue, wrist band, yellow gown, etc. Monitor gait and stability. Monitor for mental status changes and reorient to person, place, and time. Review medications for side effects contributing to fall risk. Reinforce activity limits and safety measures with patient and family.

## 2019-08-01 ENCOUNTER — OUTPATIENT (OUTPATIENT)
Dept: OUTPATIENT SERVICES | Facility: HOSPITAL | Age: 54
LOS: 1 days | End: 2019-08-01
Payer: MEDICAID

## 2019-08-12 DIAGNOSIS — Z71.89 OTHER SPECIFIED COUNSELING: ICD-10-CM

## 2019-09-02 ENCOUNTER — EMERGENCY (EMERGENCY)
Facility: HOSPITAL | Age: 54
LOS: 0 days | Discharge: LEFT BEFORE DISCHARGE | End: 2019-09-03
Attending: EMERGENCY MEDICINE

## 2019-09-02 DIAGNOSIS — R69 ILLNESS, UNSPECIFIED: ICD-10-CM

## 2019-09-03 VITALS
HEART RATE: 118 BPM | OXYGEN SATURATION: 96 % | TEMPERATURE: 98 F | HEIGHT: 66 IN | SYSTOLIC BLOOD PRESSURE: 122 MMHG | RESPIRATION RATE: 18 BRPM | DIASTOLIC BLOOD PRESSURE: 81 MMHG | WEIGHT: 179.9 LBS
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
## 2019-09-15 ENCOUNTER — EMERGENCY (EMERGENCY)
Facility: HOSPITAL | Age: 54
LOS: 0 days | Discharge: HOME | End: 2019-09-15
Attending: EMERGENCY MEDICINE
Payer: MEDICAID

## 2019-09-15 VITALS
RESPIRATION RATE: 17 BRPM | DIASTOLIC BLOOD PRESSURE: 87 MMHG | HEART RATE: 105 BPM | OXYGEN SATURATION: 95 % | WEIGHT: 128.97 LBS | TEMPERATURE: 99 F | SYSTOLIC BLOOD PRESSURE: 144 MMHG | HEIGHT: 62 IN

## 2019-09-15 DIAGNOSIS — F10.129 ALCOHOL ABUSE WITH INTOXICATION, UNSPECIFIED: ICD-10-CM

## 2019-09-15 DIAGNOSIS — F10.120 ALCOHOL ABUSE WITH INTOXICATION, UNCOMPLICATED: ICD-10-CM

## 2019-09-15 DIAGNOSIS — F41.8 OTHER SPECIFIED ANXIETY DISORDERS: ICD-10-CM

## 2019-09-15 PROCEDURE — 99285 EMERGENCY DEPT VISIT HI MDM: CPT

## 2019-09-15 NOTE — ED PROVIDER NOTE - CLINICAL SUMMARY MEDICAL DECISION MAKING FREE TEXT BOX
Pt with EtOH intoxication with suicidal ideation to family. History of previous SI in past with no known attempts. Pt denies SI at this time. Pt states she was just being dramatic. Will monitor to sobriety and reassess SI.

## 2019-09-15 NOTE — ED PROVIDER NOTE - NSFOLLOWUPINSTRUCTIONS_ED_ALL_ED_FT
Substance Use Disorder  Substance use disorder occurs when a person's repeated use of drugs or alcohol interferes with his or her ability to be productive. This disorder can cause problems with mental and physical health. It can affect your ability to have healthy relationships, and it can keep you from being able to meet your responsibilities at work, home, or school. It can also lead to addiction, which is a condition in which the person cannot stop using the substance consistently for a period of time.    The most commonly abused substances include:  Alcohol.  Tobacco.  Marijuana.  Stimulants, such as cocaine and methamphetamine.  Hallucinogens, such as LSD and PCP.  Opioids, such as some prescription pain medicines and heroin.  What are the causes?  This condition may develop due to many complex social, psychological, or physical reasons, such as:  Stress.  Abuse.  Peer pressure.  Anxiety or depression.  What increases the risk?  This condition is more likely to develop in people who:  Use substances to cope with stress.  Have been abused.  Have a mental health disorder, such as depression.  Have a family history of substance use disorder.  What are the signs or symptoms?  Symptoms of this condition include:  Using the substance for longer periods of time or at a higher dosage than what is normal or intended.  Having a lasting desire to use the substance.  Being unable to slow down or stop your use of the substance.  Spending an abnormal amount of time getting the substance, using the substance, or recovering from using the substance.  Craving the substance.  Using the substance in a way that interferes with work, school, social activities, and personal relationships.  Using the substance even after having negative consequences, such as:  Health problems.  Legal or financial troubles.  Job loss.  Broken relationships.  Needing more and more of the substance to get the same effect (developing tolerance).  Experiencing unpleasant symptoms if you do not use the substance (withdrawal).  Using the substance to avoid withdrawal.  How is this diagnosed?  This condition may be diagnosed based on:  A physical exam.  Your history of substance use.  Your symptoms. This includes:  How substance use affects your life.  Changes in personality, behaviors, and mood.  Having at least two symptoms of substance use disorder within a 12-month period.  Health issues related to substance use, such as liver damage, shortness of breath, fatigue, cough, or heart problems.  Blood or urine tests to screen for alcohol and drugs.  How is this treated?  ImageThis condition may be treated by:  Stopping substance use safely. This may require taking medicines and being closely observed for several days.  Taking part in group and individual counseling from mental health providers who help people with substance use disorder.  Staying at a live-in (residential) treatment center for several days or weeks.  Attending daily counseling sessions at a treatment center.  Taking medicine as told by your health care provider:  To ease symptoms and prevent complications during withdrawal.  To treat other mental health issues, such as depression or anxiety.  To block cravings by causing the same effects as the substance.  To block the effects of the substance or replace good sensations with unpleasant ones.  Going to a support group to share your experience with others who are going through the same thing.  Recovery can be a long process. Many people who undergo treatment start using the substance again after stopping (relapse). If you relapse, that does not mean that treatment will not work.    Follow these instructions at home:  Image   Take over-the-counter and prescription medicines only as told by your health care provider.  Do not use any drugs or alcohol.  Attend support groups as needed. These groups, including 12-step programs like Alcoholics Anonymous and Narcotics Anonymous, are an important part of long-term recovery for many people.  Keep all follow-up visits as told by your health care providers. This is important. This includes continuing to work with therapists and support groups.  Contact a health care provider if:  You cannot take your medicines as told.  Your symptoms get worse.  You have trouble resisting the urge to use drugs or alcohol.  Get help right away if you:  Relapse.  Think that you may have taken too much of a drug. The hotline of the National Poison Control Center is (390) 894-6551.  Have signs of an overdose. Symptoms include:  Chest pain.  Confusion.  Sleepiness or difficulty staying awake.  Slowed breathing.  Nausea or vomiting.  A seizure.  Have serious thoughts about hurting yourself or someone else.  Drug overdose is an emergency. Do not wait to see if the symptoms will go away. Get medical help right away. Call your local emergency services (871 in the [place] Do not drive yourself to the hospital.     If you ever feel like you may hurt yourself or others, or have thoughts about taking your own life, get help right away. You can go to your nearest emergency department or call:   Your local emergency services (154 in the [place]   A suicide crisis helpline, such as the National Suicide Prevention Lifeline at 1-628.377.8776. This is open 24 hours a day.   Summary  Substance use disorder occurs when a person's repeated use of drugs or alcohol interferes with his or her ability to be productive. It can affect your ability to have healthy relationships, keep you from being able to meet your responsibilities at work, home, or school, and lead to addiction.  Taking part in group and individual counseling from mental health providers is one possible treatment for people with substance use disorder.  Recovery can be a long process. Many people who undergo treatment start using the substance again after stopping (relapse). A relapse does not mean that treatment will not work.  Attend support groups as needed, such as Alcoholics Anonymous and Narcotics Anonymous. These groups are an important part of long-term recovery for many people.  This information is not intended to replace advice given to you by your health care provider. Make sure you discuss any questions you have with your health care provider.    Document Released: 08/09/2006 Document Revised: 01/29/2019 Document Reviewed: 01/29/2019  [employer] Interactive Patient Education © 2019 [employer]

## 2019-09-15 NOTE — ED PROVIDER NOTE - PATIENT PORTAL LINK FT
You can access the FollowMyHealth Patient Portal offered by [facility] by registering at the following website: [email or web address]. By joining [employer]’s FollowMyHealth portal, you will also be able to view your health information using other applications (apps) compatible with our system.
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
## 2019-09-15 NOTE — ED PROVIDER NOTE - PROGRESS NOTE DETAILS
Scribe CD for ED attending, Doctor Perkins. Pt's daughter Winnie-  [phone] Jaciel PALM for ED attending, Doctor Perkins. Spoke with pt's daughter Winnie. Daughter is concerned as pt continues to voice thoughts of self harm. Javier Perkins MD The patient is clinically sober. The patient is alert and oriented x 3, is clear and coherent in conversation and has a normal gait and shows no signs of acute intoxication. The patient is safe for discharge. Pt denies any SI/HI at this time.
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
## 2019-09-15 NOTE — ED ADULT NURSE NOTE - NSIMPLEMENTINTERV_GEN_ALL_ED
Implemented All Universal Safety Interventions:  [place] to call system. Call bell, personal items and telephone within reach. Instruct patient to call for assistance. Room bathroom lighting operational. Non-slip footwear when patient is off stretcher. Physically safe environment: no spills, clutter or unnecessary equipment. Stretcher in lowest position, wheels locked, appropriate side rails in place.

## 2019-10-15 ENCOUNTER — EMERGENCY (EMERGENCY)
Facility: HOSPITAL | Age: 54
LOS: 0 days | Discharge: HOME | End: 2019-10-15
Attending: EMERGENCY MEDICINE
Payer: MEDICAID

## 2019-10-15 VITALS
DIASTOLIC BLOOD PRESSURE: 78 MMHG | HEART RATE: 99 BPM | HEIGHT: 62 IN | RESPIRATION RATE: 18 BRPM | TEMPERATURE: 98 F | SYSTOLIC BLOOD PRESSURE: 109 MMHG | OXYGEN SATURATION: 98 % | WEIGHT: 121.03 LBS
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
## 2019-10-15 DIAGNOSIS — F41.9 ANXIETY DISORDER, UNSPECIFIED: ICD-10-CM
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
## 2019-10-15 DIAGNOSIS — F10.10 ALCOHOL ABUSE, UNCOMPLICATED: ICD-10-CM

## 2019-10-15 DIAGNOSIS — T76.21XA ADULT SEXUAL ABUSE, SUSPECTED, INITIAL ENCOUNTER: ICD-10-CM

## 2019-10-15 DIAGNOSIS — S21.90XA UNSPECIFIED OPEN WOUND OF UNSPECIFIED PART OF THORAX, INITIAL ENCOUNTER: ICD-10-CM

## 2019-10-15 DIAGNOSIS — S60.812A ABRASION OF LEFT WRIST, INITIAL ENCOUNTER: ICD-10-CM

## 2019-10-15 DIAGNOSIS — I10 ESSENTIAL (PRIMARY) HYPERTENSION: ICD-10-CM

## 2019-10-15 DIAGNOSIS — S60.811A ABRASION OF RIGHT WRIST, INITIAL ENCOUNTER: ICD-10-CM
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
## 2019-10-15 LAB
ALBUMIN SERPL ELPH-MCNC: 3.9 G/DL — SIGNIFICANT CHANGE UP (ref 3.3–5)
ALP SERPL-CCNC: 131 U/L — HIGH (ref 40–120)
ALT FLD-CCNC: 50 U/L — SIGNIFICANT CHANGE UP (ref 12–78)
ANION GAP SERPL CALC-SCNC: 9 MMOL/L — SIGNIFICANT CHANGE UP (ref 5–17)
APAP SERPL-MCNC: <2 UG/ML — LOW (ref 10–30)
AST SERPL-CCNC: 35 U/L — SIGNIFICANT CHANGE UP (ref 15–37)
BASOPHILS # BLD AUTO: 0.02 K/UL — SIGNIFICANT CHANGE UP (ref 0–0.2)
BASOPHILS NFR BLD AUTO: 0.5 % — SIGNIFICANT CHANGE UP (ref 0–2)
BILIRUB SERPL-MCNC: 0.3 MG/DL — SIGNIFICANT CHANGE UP (ref 0.2–1.2)
BUN SERPL-MCNC: 12 MG/DL — SIGNIFICANT CHANGE UP (ref 7–23)
CALCIUM SERPL-MCNC: 9 MG/DL — SIGNIFICANT CHANGE UP (ref 8.5–10.1)
CHLORIDE SERPL-SCNC: 107 MMOL/L — SIGNIFICANT CHANGE UP (ref 96–108)
CO2 SERPL-SCNC: 27 MMOL/L — SIGNIFICANT CHANGE UP (ref 22–31)
CREAT SERPL-MCNC: 0.69 MG/DL — SIGNIFICANT CHANGE UP (ref 0.5–1.3)
EOSINOPHIL # BLD AUTO: 0.11 K/UL — SIGNIFICANT CHANGE UP (ref 0–0.5)
EOSINOPHIL NFR BLD AUTO: 2.5 % — SIGNIFICANT CHANGE UP (ref 0–6)
ETHANOL SERPL-MCNC: 96 MG/DL — HIGH (ref 0–10)
GLUCOSE SERPL-MCNC: 78 MG/DL — SIGNIFICANT CHANGE UP (ref 70–99)
HCT VFR BLD CALC: 41.9 % — SIGNIFICANT CHANGE UP (ref 34.5–45)
HGB BLD-MCNC: 13.6 G/DL — SIGNIFICANT CHANGE UP (ref 11.5–15.5)
IMM GRANULOCYTES NFR BLD AUTO: 0.5 % — SIGNIFICANT CHANGE UP (ref 0–1.5)
LYMPHOCYTES # BLD AUTO: 1.17 K/UL — SIGNIFICANT CHANGE UP (ref 1–3.3)
LYMPHOCYTES # BLD AUTO: 26.4 % — SIGNIFICANT CHANGE UP (ref 13–44)
MCHC RBC-ENTMCNC: 32.5 GM/DL — SIGNIFICANT CHANGE UP (ref 32–36)
MCHC RBC-ENTMCNC: 33.1 PG — SIGNIFICANT CHANGE UP (ref 27–34)
MCV RBC AUTO: 101.9 FL — HIGH (ref 80–100)
MONOCYTES # BLD AUTO: 0.37 K/UL — SIGNIFICANT CHANGE UP (ref 0–0.9)
MONOCYTES NFR BLD AUTO: 8.3 % — SIGNIFICANT CHANGE UP (ref 2–14)
NEUTROPHILS # BLD AUTO: 2.75 K/UL — SIGNIFICANT CHANGE UP (ref 1.8–7.4)
NEUTROPHILS NFR BLD AUTO: 61.8 % — SIGNIFICANT CHANGE UP (ref 43–77)
PCP SPEC-MCNC: SIGNIFICANT CHANGE UP
PLATELET # BLD AUTO: 244 K/UL — SIGNIFICANT CHANGE UP (ref 150–400)
POTASSIUM SERPL-MCNC: 3.8 MMOL/L — SIGNIFICANT CHANGE UP (ref 3.5–5.3)
POTASSIUM SERPL-SCNC: 3.8 MMOL/L — SIGNIFICANT CHANGE UP (ref 3.5–5.3)
PROT SERPL-MCNC: 7.5 GM/DL — SIGNIFICANT CHANGE UP (ref 6–8.3)
RBC # BLD: 4.11 M/UL — SIGNIFICANT CHANGE UP (ref 3.8–5.2)
RBC # FLD: 11.3 % — SIGNIFICANT CHANGE UP (ref 10.3–14.5)
SALICYLATES SERPL-MCNC: <1.7 MG/DL — LOW (ref 2.8–20)
SODIUM SERPL-SCNC: 143 MMOL/L — SIGNIFICANT CHANGE UP (ref 135–145)
WBC # BLD: 4.44 K/UL — SIGNIFICANT CHANGE UP (ref 3.8–10.5)
WBC # FLD AUTO: 4.44 K/UL — SIGNIFICANT CHANGE UP (ref 3.8–10.5)

## 2019-10-15 PROCEDURE — 93005 ELECTROCARDIOGRAM TRACING: CPT

## 2019-10-15 PROCEDURE — 80053 COMPREHEN METABOLIC PANEL: CPT

## 2019-10-15 PROCEDURE — 85025 COMPLETE CBC W/AUTO DIFF WBC: CPT

## 2019-10-15 PROCEDURE — 99283 EMERGENCY DEPT VISIT LOW MDM: CPT

## 2019-10-15 PROCEDURE — 99283 EMERGENCY DEPT VISIT LOW MDM: CPT | Mod: 25

## 2019-10-15 PROCEDURE — 80307 DRUG TEST PRSMV CHEM ANLYZR: CPT

## 2019-10-15 PROCEDURE — 36415 COLL VENOUS BLD VENIPUNCTURE: CPT

## 2019-10-15 PROCEDURE — 93010 ELECTROCARDIOGRAM REPORT: CPT

## 2019-10-15 RX ORDER — TETANUS TOXOID, REDUCED DIPHTHERIA TOXOID AND ACELLULAR PERTUSSIS VACCINE, ADSORBED 5; 2.5; 8; 8; 2.5 [IU]/.5ML; [IU]/.5ML; UG/.5ML; UG/.5ML; UG/.5ML
0.5 SUSPENSION INTRAMUSCULAR ONCE
Refills: 0 | Status: COMPLETED | OUTPATIENT
Start: 2019-10-15 | End: 2019-10-15

## 2019-10-15 RX ORDER — SODIUM CHLORIDE 9 MG/ML
1000 INJECTION INTRAMUSCULAR; INTRAVENOUS; SUBCUTANEOUS ONCE
Refills: 0 | Status: DISCONTINUED | OUTPATIENT
Start: 2019-10-15 | End: 2019-10-15

## 2019-10-15 RX ADMIN — Medication 50 MILLIGRAM(S): at 13:43

## 2019-10-15 NOTE — ED ADULT NURSE NOTE - NSIMPLEMENTINTERV_GEN_ALL_ED
Implemented All Fall with Harm Risk Interventions:  [place] to call system. Call bell, personal items and telephone within reach. Instruct patient to call for assistance. Room bathroom lighting operational. Non-slip footwear when patient is off stretcher. Physically safe environment: no spills, clutter or unnecessary equipment. Stretcher in lowest position, wheels locked, appropriate side rails in place. Provide visual cue, wrist band, yellow gown, etc. Monitor gait and stability. Monitor for mental status changes and reorient to person, place, and time. Review medications for side effects contributing to fall risk. Reinforce activity limits and safety measures with patient and family. Provide visual clues: red socks.
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
## 2019-10-15 NOTE — ED PROVIDER NOTE - SKIN, MLM
Skin normal color for race, warm, dry. No evidence of rash. Abrasions b/l wrists. 3 puncture superficial 1cm lacerations.
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
## 2019-10-15 NOTE — SBIRT NOTE ADULT - NSSBIRTALCPASSREFTXDET_GEN_A_CORE
Provided SBIRT services: Full screen positive. Referral to Treatment Performed. Screening results were reviewed with the patient and patient was provided information about healthy guidelines and potential negative consequences associated with level of risk. Motivation and readiness to reduce or stop use was discussed and goals and activities to make changes were suggested/offered.  [employer] medical clearance. Referral for complete assessment and level of care determination at a certified treatment facility was completed by giving the patient information for treatment facilities that met their needs and encouraging them to call for an appointment. A call was not made to a facility because:  Patient not interested at this time; Offered information, patient declined: [employer], case management service.

## 2019-10-15 NOTE — ED PROVIDER NOTE - PATIENT PORTAL LINK FT
You can access the FollowMyHealth Patient Portal offered by [facility] by registering at the following website: [email or web address]. By joining [employer]’s FollowMyHealth portal, you will also be able to view your health information using other applications (apps) compatible with our system.

## 2019-10-15 NOTE — ED PROVIDER NOTE - NSFOLLOWUPINSTRUCTIONS_ED_ALL_ED_FT
LACERATION WITHOUT CLOSURE - AfterCare(R) Instructions(ER/ED)     Laceration Without Closure    WHAT YOU NEED TO KNOW:    Your laceration has gone past the time to be closed. Lacerations in areas of poor blood flow usually need to be closed within 8 hours. In areas with normal blood flow, lacerations usually need to be closed within 12 hours. Facial lacerations need to be closed within 24 hours. Your laceration has been cleaned and a dressing has been applied. Your laceration will heal on its own without sutures, staples, or other closure devices.     DISCHARGE INSTRUCTIONS:    Return to the emergency department if:     You have redness, pain, or fever that gets worse quickly.      Your wound has a bad smell or has pus draining from it.      You have bleeding that does not stop after 10 minutes of holding firm, direct pressure on your wound.    Contact your healthcare provider if: You have questions or concerns about your condition or care.    Wound care:     Keep the wound dry for the first 24 to 48 hours or as directed. Wash your hands with soap and warm water before and after you care for your wound. After that, gently clean the wound once or twice a day with cool water. Use soap to clean around the wound, but try not to get any on the wound edges. Do not use alcohol or hydrogen peroxide to clean your wound unless you are directed to do so.      Leave your bandage on as long as directed. Bandages keep your wound clean and protected. They can also prevent swelling. Ask how to change and how often to change your bandage. Ask if you should apply antibacterial ointment. Be careful not to wrap the bandage or tape too tightly. This could cut off blood flow and cause more injury. Change your bandages when they get wet or dirty.    Follow up with your healthcare provider within 2 days or as directed: Write down your questions so you remember to ask them during your visits.       © Copyright [employer] 2019 All illustrations and images included in CareNotes are the copyrighted property of A.D.A.M., Inc. or [employer].      back to top                      © Copyright [employer] 2019
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
## 2019-10-15 NOTE — ED ADULT NURSE REASSESSMENT NOTE - NS ED NURSE REASSESS COMMENT FT1
Patient states she wants to go home. She came in to detox like she did last time she was here in ICU. Patient advised there is no detox program but sbirt can find her one. Patient wants to go home and proceeded to get dressed and walk out. Anastasia BIRMINGHAM aware, Magda panchal aware, Dr. Hendricks aware. Patient refuses discharge paperwork.
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
## 2019-10-19 ENCOUNTER — EMERGENCY (EMERGENCY)
Facility: HOSPITAL | Age: 54
LOS: 0 days | Discharge: HOME | End: 2019-10-19
Attending: EMERGENCY MEDICINE
Payer: MEDICAID

## 2019-10-19 VITALS
OXYGEN SATURATION: 99 % | SYSTOLIC BLOOD PRESSURE: 136 MMHG | RESPIRATION RATE: 16 BRPM | DIASTOLIC BLOOD PRESSURE: 93 MMHG | TEMPERATURE: 98 F | HEART RATE: 88 BPM

## 2019-10-19 VITALS
SYSTOLIC BLOOD PRESSURE: 136 MMHG | RESPIRATION RATE: 16 BRPM | HEIGHT: 62 IN | OXYGEN SATURATION: 99 % | DIASTOLIC BLOOD PRESSURE: 93 MMHG | TEMPERATURE: 98 F | HEART RATE: 88 BPM | WEIGHT: 121.03 LBS

## 2019-10-19 DIAGNOSIS — S20.319A ABRASION OF UNSPECIFIED FRONT WALL OF THORAX, INITIAL ENCOUNTER: ICD-10-CM
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
## 2019-10-19 DIAGNOSIS — I10 ESSENTIAL (PRIMARY) HYPERTENSION: ICD-10-CM
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
## 2019-10-19 DIAGNOSIS — S20.312A ABRASION OF LEFT FRONT WALL OF THORAX, INITIAL ENCOUNTER: ICD-10-CM

## 2019-10-19 DIAGNOSIS — Z72.89 OTHER PROBLEMS RELATED TO LIFESTYLE: ICD-10-CM

## 2019-10-19 PROCEDURE — 99282 EMERGENCY DEPT VISIT SF MDM: CPT

## 2019-10-19 NOTE — ED PROVIDER NOTE - OBJECTIVE STATEMENT
53 y/o female in ED c/o bleeding from left chest after sliding off ladder at home.  pt states drank glass of wine tonight, was working on her grandchild's nursery when she slid down her ladder leading to abrasions to her chest.  pt denies any LOC, HA, head trauma, neck pain, sob, n/v/d/abd pain.    pt states persistent bleeding from wound prompting her ED visit.
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
## 2019-10-19 NOTE — ED PROVIDER NOTE - PATIENT PORTAL LINK FT
You can access the FollowMyHealth Patient Portal offered by [facility] by registering at the following website: [email or web address]. By joining [employer]’s FollowMyHealth portal, you will also be able to view your health information using other applications (apps) compatible with our system. You can access the FollowMyHealth Patient Portal offered by [facility] by registering at the following website: [email or web address]. By joining [employer]’s FollowMyHealth portal, you will also be able to view your health information using other applications (apps) compatible with our system.
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
## 2019-10-19 NOTE — ED PROVIDER NOTE - PROGRESS NOTE DETAILS
pt now changing her story.   concern for alcohol intoxication.    will obtain CTs, labs, monitor and reeval pt A&Ox3.    pt states again that she had a glass of wine tonight and that she is not drunk.   pt states that it is not illegal to drink a glass of wine.   pt again states does not want anything done except to have her wounds cleaned and covered.   pt states that she will f/u with her PMD Dr Tameka in the morning.   pt ambulated on her own accord to beverage area, made herself coffee and ambulated back to her room without difficulty.   will d/c with f/u

## 2019-10-19 NOTE — ED PROVIDER NOTE - CLINICAL SUMMARY MEDICAL DECISION MAKING FREE TEXT BOX
pt states drank glass of wine tonight, was working on her grandchild's nursery when she slid down her ladder leading to abrasions to her chest.   pt states feels fine and wants to go home.   pt A&Ox3 with steady gait.  clinically sober.    pt states will take cab home

## 2019-10-20 ENCOUNTER — EMERGENCY (EMERGENCY)
Facility: HOSPITAL | Age: 54
LOS: 1 days | Discharge: HOME | End: 2019-10-20
Attending: EMERGENCY MEDICINE
Payer: MEDICAID

## 2019-10-20 VITALS
OXYGEN SATURATION: 99 % | TEMPERATURE: 98 F | DIASTOLIC BLOOD PRESSURE: 80 MMHG | HEART RATE: 113 BPM | RESPIRATION RATE: 18 BRPM | SYSTOLIC BLOOD PRESSURE: 117 MMHG

## 2019-10-20 VITALS
WEIGHT: 128.09 LBS | TEMPERATURE: 98 F | OXYGEN SATURATION: 97 % | HEIGHT: 62 IN | RESPIRATION RATE: 20 BRPM | HEART RATE: 110 BPM

## 2019-10-20 PROCEDURE — 99284 EMERGENCY DEPT VISIT MOD MDM: CPT

## 2019-10-20 PROCEDURE — 96372 THER/PROPH/DIAG INJ SC/IM: CPT

## 2019-10-20 PROCEDURE — 99285 EMERGENCY DEPT VISIT HI MDM: CPT | Mod: 25

## 2019-10-20 RX ORDER — SODIUM CHLORIDE 9 MG/ML
1000 INJECTION, SOLUTION INTRAVENOUS
Refills: 0 | Status: DISCONTINUED | OUTPATIENT
Start: 2019-10-20 | End: 2019-10-25

## 2019-10-20 RX ORDER — HALOPERIDOL DECANOATE 100 MG/ML
5 INJECTION INTRAMUSCULAR ONCE
Refills: 0 | Status: COMPLETED | OUTPATIENT
Start: 2019-10-20 | End: 2019-10-20

## 2019-10-20 RX ADMIN — HALOPERIDOL DECANOATE 5 MILLIGRAM(S): 100 INJECTION INTRAMUSCULAR at 02:30

## 2019-10-20 RX ADMIN — Medication 2 MILLIGRAM(S): at 02:30

## 2019-10-20 NOTE — ED ADULT TRIAGE NOTE - CHIEF COMPLAINT QUOTE
patient states that she needs help with her drinking, states that she is not OK, looking for detox, last drink 1/2 hour ago, has been drinking for 5 years, denies SI/HI

## 2019-10-20 NOTE — ED PROVIDER NOTE - ATTENDING CONTRIBUTION TO CARE
54y old intox, walking in ed, came byhelfself, wants to be released, is emotionally upset, planto monitor in ed, requesting to walk out, patient recd chemical sedation, for safety, I personally saw the patient with the PA, and completed the key components of the history and physical exam. I then discussed the management plan with the PA.
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
## 2019-10-20 NOTE — ED ADULT TRIAGE NOTE - WEIGHT METHOD
Instructed pt on s/s infection - chills, fever, malaise, NV, increased redness/swelling/pain/exudate - and to go to ER/Urgent Care; on rationale for debridement of calluses; to shake out shoes before donning; to wear protective footwear at all times, including when at home; to examine feet daily for any new redness/wounds and report to PCP; to moisturize feet daily except between toes with water-based moisturizer; to keep tight control over BS for optimum health; on different treatments for nail fungus (oral meds, topical meds and Tea Tree oil); to return to [facility] for foot and nail care every 2-3 months. Pt verbalized understanding of all instruction.    stated

## 2019-10-20 NOTE — ED PROVIDER NOTE - PHYSICAL EXAMINATION
Head - NCAT. Airway patent. Eyes - PERRL. CV - RRR. no murmur. no edema. Pulm - CTAB. Abd - soft, nt. no rebound. no guarding. Neuro - A&Ox3. Skin - No rash.

## 2019-10-20 NOTE — ED PROVIDER NOTE - NS ED ROS FT
No fever/chills, No photophobia/eye pain/changes in vision, No ear pain/sore throat/dysphagia, No chest pain/palpitations, no SOB/cough/wheeze/stridor, No abdominal pain, No N/V/D, no dysuria/frequency/discharge, No neck/back pain, no rash.

## 2019-10-20 NOTE — ED ADULT NURSE REASSESSMENT NOTE - NS ED NURSE REASSESS COMMENT FT1
pt becoming increasingly agitated, MD called to bedside for evaluation. descalation techniques in use by RN. will continue to monitor and reassess

## 2019-10-20 NOTE — ED PROVIDER NOTE - PATIENT PORTAL LINK FT
You can access the FollowMyHealth Patient Portal offered by [facility] by registering at the following website: [email or web address]. By joining [employer]’s FollowMyHealth portal, you will also be able to view your health information using other applications (apps) compatible with our system.

## 2019-10-20 NOTE — ED ADULT NURSE NOTE - NSIMPLEMENTINTERV_GEN_ALL_ED
Implemented All Fall Risk Interventions:  [place] to call system. Call bell, personal items and telephone within reach. Instruct patient to call for assistance. Room bathroom lighting operational. Non-slip footwear when patient is off stretcher. Physically safe environment: no spills, clutter or unnecessary equipment. Stretcher in lowest position, wheels locked, appropriate side rails in place. Provide visual cue, wrist band, yellow gown, etc. Monitor gait and stability. Monitor for mental status changes and reorient to person, place, and time. Review medications for side effects contributing to fall risk. Reinforce activity limits and safety measures with patient and family.

## 2019-10-20 NOTE — ED ADULT NURSE NOTE - OBJECTIVE STATEMENT
pt lying in bed in a yellow gown. pt is agitated pt lying in bed in a yellow gown. pt is agitated with small lacerations to various areas of her chest. as per pt she "came for help and we are making it worst".

## 2019-10-20 NOTE — ED ADULT NURSE REASSESSMENT NOTE - NS ED NURSE REASSESS COMMENT FT1
pt still agitated despite deescalation techniques. security called to bedside. pt agitation medically managed, medication given as ordered. pt placed on pulse ox. will continue to monitor and reassess
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
## 2019-10-24 ENCOUNTER — TRANSCRIPTION ENCOUNTER (OUTPATIENT)
Age: 54
End: 2019-10-24

## 2019-11-05 ENCOUNTER — EMERGENCY (EMERGENCY)
Facility: HOSPITAL | Age: 54
LOS: 0 days | Discharge: HOME | End: 2019-11-06
Attending: EMERGENCY MEDICINE
Payer: MEDICAID

## 2019-11-05 VITALS
RESPIRATION RATE: 17 BRPM | DIASTOLIC BLOOD PRESSURE: 99 MMHG | WEIGHT: 139.99 LBS | TEMPERATURE: 98 F | HEART RATE: 94 BPM | HEIGHT: 63 IN | SYSTOLIC BLOOD PRESSURE: 138 MMHG | OXYGEN SATURATION: 97 %

## 2019-11-05 DIAGNOSIS — F10.129 ALCOHOL ABUSE WITH INTOXICATION, UNSPECIFIED: ICD-10-CM

## 2019-11-05 DIAGNOSIS — F10.229 ALCOHOL DEPENDENCE WITH INTOXICATION, UNSPECIFIED: ICD-10-CM

## 2019-11-05 DIAGNOSIS — R11.11 VOMITING WITHOUT NAUSEA: ICD-10-CM
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
## 2019-11-05 LAB
APPEARANCE UR: CLEAR — SIGNIFICANT CHANGE UP
BASOPHILS # BLD AUTO: 0.03 K/UL — SIGNIFICANT CHANGE UP (ref 0–0.2)
BASOPHILS NFR BLD AUTO: 0.4 % — SIGNIFICANT CHANGE UP (ref 0–2)
BILIRUB UR-MCNC: NEGATIVE — SIGNIFICANT CHANGE UP
COLOR SPEC: YELLOW — SIGNIFICANT CHANGE UP
DIFF PNL FLD: NEGATIVE — SIGNIFICANT CHANGE UP
EOSINOPHIL # BLD AUTO: 0.15 K/UL — SIGNIFICANT CHANGE UP (ref 0–0.5)
EOSINOPHIL NFR BLD AUTO: 2.1 % — SIGNIFICANT CHANGE UP (ref 0–6)
ETHANOL SERPL-MCNC: 221 MG/DL — HIGH (ref 0–10)
GLUCOSE UR QL: NEGATIVE MG/DL — SIGNIFICANT CHANGE UP
HCT VFR BLD CALC: 42.4 % — SIGNIFICANT CHANGE UP (ref 34.5–45)
HGB BLD-MCNC: 14.3 G/DL — SIGNIFICANT CHANGE UP (ref 11.5–15.5)
IMM GRANULOCYTES NFR BLD AUTO: 0.3 % — SIGNIFICANT CHANGE UP (ref 0–1.5)
KETONES UR-MCNC: NEGATIVE — SIGNIFICANT CHANGE UP
LEUKOCYTE ESTERASE UR-ACNC: ABNORMAL
LYMPHOCYTES # BLD AUTO: 2.5 K/UL — SIGNIFICANT CHANGE UP (ref 1–3.3)
LYMPHOCYTES # BLD AUTO: 34.4 % — SIGNIFICANT CHANGE UP (ref 13–44)
MCHC RBC-ENTMCNC: 33.7 GM/DL — SIGNIFICANT CHANGE UP (ref 32–36)
MCHC RBC-ENTMCNC: 33.9 PG — SIGNIFICANT CHANGE UP (ref 27–34)
MCV RBC AUTO: 100.5 FL — HIGH (ref 80–100)
MONOCYTES # BLD AUTO: 0.39 K/UL — SIGNIFICANT CHANGE UP (ref 0–0.9)
MONOCYTES NFR BLD AUTO: 5.4 % — SIGNIFICANT CHANGE UP (ref 2–14)
NEUTROPHILS # BLD AUTO: 4.18 K/UL — SIGNIFICANT CHANGE UP (ref 1.8–7.4)
NEUTROPHILS NFR BLD AUTO: 57.4 % — SIGNIFICANT CHANGE UP (ref 43–77)
NITRITE UR-MCNC: NEGATIVE — SIGNIFICANT CHANGE UP
PCP SPEC-MCNC: SIGNIFICANT CHANGE UP
PH UR: 6 — SIGNIFICANT CHANGE UP (ref 5–8)
PLATELET # BLD AUTO: 288 K/UL — SIGNIFICANT CHANGE UP (ref 150–400)
PROT UR-MCNC: NEGATIVE MG/DL — SIGNIFICANT CHANGE UP
RBC # BLD: 4.22 M/UL — SIGNIFICANT CHANGE UP (ref 3.8–5.2)
RBC # FLD: 11.8 % — SIGNIFICANT CHANGE UP (ref 10.3–14.5)
SP GR SPEC: 1 — LOW (ref 1.01–1.02)
UROBILINOGEN FLD QL: NEGATIVE MG/DL — SIGNIFICANT CHANGE UP
WBC # BLD: 7.27 K/UL — SIGNIFICANT CHANGE UP (ref 3.8–10.5)
WBC # FLD AUTO: 7.27 K/UL — SIGNIFICANT CHANGE UP (ref 3.8–10.5)

## 2019-11-05 PROCEDURE — 84702 CHORIONIC GONADOTROPIN TEST: CPT

## 2019-11-05 PROCEDURE — 96361 HYDRATE IV INFUSION ADD-ON: CPT

## 2019-11-05 PROCEDURE — 85610 PROTHROMBIN TIME: CPT

## 2019-11-05 PROCEDURE — 85025 COMPLETE CBC W/AUTO DIFF WBC: CPT

## 2019-11-05 PROCEDURE — 83690 ASSAY OF LIPASE: CPT

## 2019-11-05 PROCEDURE — 96375 TX/PRO/DX INJ NEW DRUG ADDON: CPT

## 2019-11-05 PROCEDURE — 99285 EMERGENCY DEPT VISIT HI MDM: CPT | Mod: 25

## 2019-11-05 PROCEDURE — 80053 COMPREHEN METABOLIC PANEL: CPT

## 2019-11-05 PROCEDURE — 36415 COLL VENOUS BLD VENIPUNCTURE: CPT

## 2019-11-05 PROCEDURE — 99284 EMERGENCY DEPT VISIT MOD MDM: CPT

## 2019-11-05 PROCEDURE — 96374 THER/PROPH/DIAG INJ IV PUSH: CPT

## 2019-11-05 PROCEDURE — 80307 DRUG TEST PRSMV CHEM ANLYZR: CPT

## 2019-11-05 PROCEDURE — 83735 ASSAY OF MAGNESIUM: CPT

## 2019-11-05 PROCEDURE — 81001 URINALYSIS AUTO W/SCOPE: CPT

## 2019-11-05 PROCEDURE — 85730 THROMBOPLASTIN TIME PARTIAL: CPT

## 2019-11-05 RX ORDER — FAMOTIDINE 10 MG/ML
20 INJECTION INTRAVENOUS ONCE
Refills: 0 | Status: COMPLETED | OUTPATIENT
Start: 2019-11-05 | End: 2019-11-05

## 2019-11-05 RX ORDER — ONDANSETRON 8 MG/1
4 TABLET, FILM COATED ORAL ONCE
Refills: 0 | Status: COMPLETED | OUTPATIENT
Start: 2019-11-05 | End: 2019-11-05

## 2019-11-05 RX ORDER — SODIUM CHLORIDE 9 MG/ML
1000 INJECTION INTRAMUSCULAR; INTRAVENOUS; SUBCUTANEOUS ONCE
Refills: 0 | Status: COMPLETED | OUTPATIENT
Start: 2019-11-05 | End: 2019-11-05

## 2019-11-05 RX ORDER — SODIUM CHLORIDE 9 MG/ML
500 INJECTION INTRAMUSCULAR; INTRAVENOUS; SUBCUTANEOUS ONCE
Refills: 0 | Status: COMPLETED | OUTPATIENT
Start: 2019-11-05 | End: 2019-11-05

## 2019-11-05 RX ORDER — CEFTRIAXONE 500 MG/1
1000 INJECTION, POWDER, FOR SOLUTION INTRAMUSCULAR; INTRAVENOUS ONCE
Refills: 0 | Status: COMPLETED | OUTPATIENT
Start: 2019-11-05 | End: 2019-11-05

## 2019-11-05 RX ORDER — CEFUROXIME AXETIL 250 MG
1 TABLET ORAL
Qty: 14 | Refills: 0
Start: 2019-11-05 | End: 2019-11-11

## 2019-11-05 RX ADMIN — FAMOTIDINE 20 MILLIGRAM(S): 10 INJECTION INTRAVENOUS at 22:09

## 2019-11-05 RX ADMIN — ONDANSETRON 4 MILLIGRAM(S): 8 TABLET, FILM COATED ORAL at 22:09

## 2019-11-05 RX ADMIN — SODIUM CHLORIDE 1000 MILLILITER(S): 9 INJECTION INTRAMUSCULAR; INTRAVENOUS; SUBCUTANEOUS at 22:09

## 2019-11-05 RX ADMIN — CEFTRIAXONE 1000 MILLIGRAM(S): 500 INJECTION, POWDER, FOR SOLUTION INTRAMUSCULAR; INTRAVENOUS at 23:59

## 2019-11-05 RX ADMIN — Medication 1 MILLIGRAM(S): at 22:09

## 2019-11-05 NOTE — ED ADULT NURSE NOTE - OBJECTIVE STATEMENT
pt to ed for intoxication  requesting detox.  Pt alert and ambulatory w/ steady gait. Pt agitated and argumentative. Skin warm and pink. Pt keeps stating she needs iv zofran and ativan. Denies generalized body weakness 5/10. VSS. Denies pain. Will monitor. Pt close to nursing station for observation.

## 2019-11-05 NOTE — ED PROVIDER NOTE - OBJECTIVE STATEMENT
53 y/o female with PMHx of presents to the ED BIBEMS for ETOH intoxication. Pt says 5 years ago she started drinking and states "I need you to help me".  Pt says she wants to be better, has "done everything." Last detox and rehab "this year at least twice." Today pt got home from [facility] where she has a part time job and had 2.5 glasses of wine, but says she didn't want to but if she doesn't she will become nausea. Reports +vomiting today. Usually drinks wine daily. No fever. NKDA.

## 2019-11-05 NOTE — ED PROVIDER NOTE - PROGRESS NOTE DETAILS
Jaciel PAPPAS for ED attending, Dr. Herrera: Pt is ambulatory in ED with steady gait and making/drinking coffee. Dr. Herrera:  Pt calmer s/p po Ativan, receiving IVF.  U/A indicative of UTI, IV ceftriaxone ordered.  Case signed out to Dr. Howard:  [] CMP    [] observe, reassess.  Pt with low CIWA score tonight, doubtful admission, inpt Detox necessary.   Including papers in her ED chart for referrals for outpt/inpt f/u, to be given upon D/C.

## 2019-11-05 NOTE — ED PROVIDER NOTE - CLINICAL SUMMARY MEDICAL DECISION MAKING FREE TEXT BOX
53 y/o female with 4 year hx of worsening ETOH abuse with prior detox/rehabs BIBEMS c/o self treating with wine for withdrawal sx. +tremulous +anxious, and +nausea. No SI.   Plan: PO Librium, IV Pepcid, IV Zofran, IV fluids, labs including blood alcohol, CBC, CMP, urine drug screen, serum lipase, magnesium, and HCG, PT & INR, and UA. Monitor, observe, reassess. 55 y/o female with 4 year hx of worsening ETOH abuse with prior detox/rehabs BIBEMS c/o self treating with wine for withdrawal sx. +tremulous +anxious, and +nausea. No SI.   Plan: PO Librium, IV Pepcid, IV Zofran, IV fluids, labs including blood alcohol, CBC, CMP, urine drug screen, serum lipase, magnesium, and HCG, PT & INR, and UA. Need SIWA score. Monitor, observe, reassess.

## 2019-11-05 NOTE — ED ADULT TRIAGE NOTE - CHIEF COMPLAINT QUOTE
Pt presents to ER intoxicated stating "I want to talk to a doctor or ". Pt denies any complaints. Pt reports drinking wine today. Pt yelling and verbally abusive to staff at triage

## 2019-11-05 NOTE — ED PROVIDER NOTE - PATIENT PORTAL LINK FT
You can access the FollowMyHealth Patient Portal offered by [facility] by registering at the following website: [email or web address]. By joining [employer]’s FollowMyHealth portal, you will also be able to view your health information using other applications (apps) compatible with our system.

## 2019-11-06 VITALS
SYSTOLIC BLOOD PRESSURE: 127 MMHG | OXYGEN SATURATION: 98 % | TEMPERATURE: 99 F | DIASTOLIC BLOOD PRESSURE: 65 MMHG | RESPIRATION RATE: 18 BRPM | HEART RATE: 89 BPM

## 2019-11-06 RX ADMIN — SODIUM CHLORIDE 500 MILLILITER(S): 9 INJECTION INTRAMUSCULAR; INTRAVENOUS; SUBCUTANEOUS at 00:32

## 2019-11-06 RX ADMIN — SODIUM CHLORIDE 1000 MILLILITER(S): 9 INJECTION INTRAMUSCULAR; INTRAVENOUS; SUBCUTANEOUS at 00:00

## 2019-11-09 ENCOUNTER — EMERGENCY (EMERGENCY)
Facility: HOSPITAL | Age: 54
LOS: 1 days | Discharge: HOME | End: 2019-11-09
Attending: EMERGENCY MEDICINE
Payer: MEDICAID

## 2019-11-09 VITALS
RESPIRATION RATE: 17 BRPM | TEMPERATURE: 99 F | OXYGEN SATURATION: 100 % | DIASTOLIC BLOOD PRESSURE: 86 MMHG | SYSTOLIC BLOOD PRESSURE: 133 MMHG | HEART RATE: 79 BPM

## 2019-11-09 VITALS
OXYGEN SATURATION: 99 % | HEIGHT: 62 IN | WEIGHT: 128.97 LBS | RESPIRATION RATE: 22 BRPM | TEMPERATURE: 99 F | HEART RATE: 83 BPM | SYSTOLIC BLOOD PRESSURE: 155 MMHG | DIASTOLIC BLOOD PRESSURE: 98 MMHG

## 2019-11-09 LAB
ALBUMIN SERPL ELPH-MCNC: 4.7 G/DL — SIGNIFICANT CHANGE UP (ref 3.3–5.2)
ALP SERPL-CCNC: 165 U/L — HIGH (ref 40–120)
ALT FLD-CCNC: 22 U/L — SIGNIFICANT CHANGE UP
AMPHET UR-MCNC: NEGATIVE — SIGNIFICANT CHANGE UP
ANION GAP SERPL CALC-SCNC: 13 MMOL/L — SIGNIFICANT CHANGE UP (ref 5–17)
APAP SERPL-MCNC: <7.5 UG/ML — LOW (ref 10–26)
APPEARANCE UR: CLEAR — SIGNIFICANT CHANGE UP
AST SERPL-CCNC: 26 U/L — SIGNIFICANT CHANGE UP
BARBITURATES UR SCN-MCNC: NEGATIVE — SIGNIFICANT CHANGE UP
BASOPHILS # BLD AUTO: 0.02 K/UL — SIGNIFICANT CHANGE UP (ref 0–0.2)
BASOPHILS NFR BLD AUTO: 0.4 % — SIGNIFICANT CHANGE UP (ref 0–2)
BENZODIAZ UR-MCNC: NEGATIVE — SIGNIFICANT CHANGE UP
BILIRUB SERPL-MCNC: 0.4 MG/DL — SIGNIFICANT CHANGE UP (ref 0.4–2)
BILIRUB UR-MCNC: NEGATIVE — SIGNIFICANT CHANGE UP
BUN SERPL-MCNC: 10 MG/DL — SIGNIFICANT CHANGE UP (ref 8–20)
CALCIUM SERPL-MCNC: 9.4 MG/DL — SIGNIFICANT CHANGE UP (ref 8.6–10.2)
CHLORIDE SERPL-SCNC: 104 MMOL/L — SIGNIFICANT CHANGE UP (ref 98–107)
CO2 SERPL-SCNC: 24 MMOL/L — SIGNIFICANT CHANGE UP (ref 22–29)
COCAINE METAB.OTHER UR-MCNC: NEGATIVE — SIGNIFICANT CHANGE UP
COLOR SPEC: YELLOW — SIGNIFICANT CHANGE UP
CREAT SERPL-MCNC: 0.52 MG/DL — SIGNIFICANT CHANGE UP (ref 0.5–1.3)
DIFF PNL FLD: NEGATIVE — SIGNIFICANT CHANGE UP
EOSINOPHIL # BLD AUTO: 0.07 K/UL — SIGNIFICANT CHANGE UP (ref 0–0.5)
EOSINOPHIL NFR BLD AUTO: 1.3 % — SIGNIFICANT CHANGE UP (ref 0–6)
ETHANOL SERPL-MCNC: 28 MG/DL — SIGNIFICANT CHANGE UP
GLUCOSE SERPL-MCNC: 85 MG/DL — SIGNIFICANT CHANGE UP (ref 70–115)
GLUCOSE UR QL: NEGATIVE MG/DL — SIGNIFICANT CHANGE UP
HCG UR QL: NEGATIVE — SIGNIFICANT CHANGE UP
HCT VFR BLD CALC: 43.9 % — SIGNIFICANT CHANGE UP (ref 34.5–45)
HGB BLD-MCNC: 14.7 G/DL — SIGNIFICANT CHANGE UP (ref 11.5–15.5)
IMM GRANULOCYTES NFR BLD AUTO: 0.2 % — SIGNIFICANT CHANGE UP (ref 0–1.5)
KETONES UR-MCNC: NEGATIVE — SIGNIFICANT CHANGE UP
LEUKOCYTE ESTERASE UR-ACNC: NEGATIVE — SIGNIFICANT CHANGE UP
LYMPHOCYTES # BLD AUTO: 1.31 K/UL — SIGNIFICANT CHANGE UP (ref 1–3.3)
LYMPHOCYTES # BLD AUTO: 24.8 % — SIGNIFICANT CHANGE UP (ref 13–44)
MAGNESIUM SERPL-MCNC: 2.2 MG/DL — SIGNIFICANT CHANGE UP (ref 1.6–2.6)
MCHC RBC-ENTMCNC: 33.5 GM/DL — SIGNIFICANT CHANGE UP (ref 32–36)
MCHC RBC-ENTMCNC: 33.6 PG — SIGNIFICANT CHANGE UP (ref 27–34)
MCV RBC AUTO: 100.5 FL — HIGH (ref 80–100)
METHADONE UR-MCNC: NEGATIVE — SIGNIFICANT CHANGE UP
MONOCYTES # BLD AUTO: 0.47 K/UL — SIGNIFICANT CHANGE UP (ref 0–0.9)
MONOCYTES NFR BLD AUTO: 8.9 % — SIGNIFICANT CHANGE UP (ref 2–14)
NEUTROPHILS # BLD AUTO: 3.4 K/UL — SIGNIFICANT CHANGE UP (ref 1.8–7.4)
NEUTROPHILS NFR BLD AUTO: 64.4 % — SIGNIFICANT CHANGE UP (ref 43–77)
NITRITE UR-MCNC: NEGATIVE — SIGNIFICANT CHANGE UP
OPIATES UR-MCNC: NEGATIVE — SIGNIFICANT CHANGE UP
PCP SPEC-MCNC: SIGNIFICANT CHANGE UP
PCP UR-MCNC: NEGATIVE — SIGNIFICANT CHANGE UP
PH UR: 7 — SIGNIFICANT CHANGE UP (ref 5–8)
PHOSPHATE SERPL-MCNC: 3 MG/DL — SIGNIFICANT CHANGE UP (ref 2.4–4.7)
PLATELET # BLD AUTO: 243 K/UL — SIGNIFICANT CHANGE UP (ref 150–400)
POTASSIUM SERPL-MCNC: 3.9 MMOL/L — SIGNIFICANT CHANGE UP (ref 3.5–5.3)
POTASSIUM SERPL-SCNC: 3.9 MMOL/L — SIGNIFICANT CHANGE UP (ref 3.5–5.3)
PROT SERPL-MCNC: 7.8 G/DL — SIGNIFICANT CHANGE UP (ref 6.6–8.7)
PROT UR-MCNC: NEGATIVE MG/DL — SIGNIFICANT CHANGE UP
RBC # BLD: 4.37 M/UL — SIGNIFICANT CHANGE UP (ref 3.8–5.2)
RBC # FLD: 11.5 % — SIGNIFICANT CHANGE UP (ref 10.3–14.5)
SODIUM SERPL-SCNC: 141 MMOL/L — SIGNIFICANT CHANGE UP (ref 135–145)
SP GR SPEC: 1 — LOW (ref 1.01–1.02)
THC UR QL: NEGATIVE — SIGNIFICANT CHANGE UP
UROBILINOGEN FLD QL: NEGATIVE MG/DL — SIGNIFICANT CHANGE UP
WBC # BLD: 5.28 K/UL — SIGNIFICANT CHANGE UP (ref 3.8–10.5)
WBC # FLD AUTO: 5.28 K/UL — SIGNIFICANT CHANGE UP (ref 3.8–10.5)

## 2019-11-09 PROCEDURE — 85027 COMPLETE CBC AUTOMATED: CPT

## 2019-11-09 PROCEDURE — 99283 EMERGENCY DEPT VISIT LOW MDM: CPT

## 2019-11-09 PROCEDURE — 80053 COMPREHEN METABOLIC PANEL: CPT

## 2019-11-09 PROCEDURE — 80307 DRUG TEST PRSMV CHEM ANLYZR: CPT

## 2019-11-09 PROCEDURE — 81025 URINE PREGNANCY TEST: CPT

## 2019-11-09 PROCEDURE — 93005 ELECTROCARDIOGRAM TRACING: CPT

## 2019-11-09 PROCEDURE — 99284 EMERGENCY DEPT VISIT MOD MDM: CPT

## 2019-11-09 PROCEDURE — 83735 ASSAY OF MAGNESIUM: CPT

## 2019-11-09 PROCEDURE — 81003 URINALYSIS AUTO W/O SCOPE: CPT

## 2019-11-09 PROCEDURE — 36415 COLL VENOUS BLD VENIPUNCTURE: CPT

## 2019-11-09 PROCEDURE — 93010 ELECTROCARDIOGRAM REPORT: CPT

## 2019-11-09 PROCEDURE — 84100 ASSAY OF PHOSPHORUS: CPT

## 2019-11-09 PROCEDURE — 71046 X-RAY EXAM CHEST 2 VIEWS: CPT

## 2019-11-09 PROCEDURE — 71046 X-RAY EXAM CHEST 2 VIEWS: CPT | Mod: 26

## 2019-11-09 RX ORDER — SODIUM CHLORIDE 9 MG/ML
1000 INJECTION INTRAMUSCULAR; INTRAVENOUS; SUBCUTANEOUS ONCE
Refills: 0 | Status: COMPLETED | OUTPATIENT
Start: 2019-11-09 | End: 2019-11-09

## 2019-11-09 RX ADMIN — SODIUM CHLORIDE 1000 MILLILITER(S): 9 INJECTION INTRAMUSCULAR; INTRAVENOUS; SUBCUTANEOUS at 15:53

## 2019-11-09 RX ADMIN — Medication 100 MILLIGRAM(S): at 15:53

## 2019-11-09 RX ADMIN — Medication 100 MILLIGRAM(S): at 18:29

## 2019-11-09 NOTE — ED PROVIDER NOTE - PROGRESS NOTE DETAILS
I discussed with patient regarding inpatient detox. She refuses both [place] and [facility]. She only wants to do inpatient detox in this hospital. I explained that if there is a medical reason to be admitted I will, but if she does not meet medical admission criteria, I cannot admit her. I had a lengthy conversation with the patient regarding her lab results and again recommended inpatient detox, which she again refuses. She wants to go home and plans to go to the rehab program in [place] in 2 days. I will give Rx for librium for 2 days and discussed the importance of prompt return to an ED if she has worsening tremors, vomiting or feels that she may have a seizure. Rx sent to her pharmacy.

## 2019-11-09 NOTE — ED ADULT NURSE NOTE - OBJECTIVE STATEMENT
Assumed pt care at 1500.  pt states shes here for detox from alcohol in order to fly to [place] for treatment at a facility that she has lined up.  Pt states she had last drink at 8pm last night 11/8.  No acute s/s of respiratory distress noted or reported at this time, will continue to monitor

## 2019-11-09 NOTE — ED PROVIDER NOTE - PATIENT PORTAL LINK FT
You can access the FollowMyHealth Patient Portal offered by [facility] by registering at the following website: [email or web address]. By joining [employer]’s FollowMyHealth portal, you will also be able to view your health information using other applications (apps) compatible with our system.

## 2019-11-09 NOTE — ED PROVIDER NOTE - OBJECTIVE STATEMENT
55 y/o F with PMH HTN, subdural hematoma with resulting seizure disorder (was previously on clonidine but self d/c'd years ago due to EtOH abuse), anxiety and EtOH abuse presents for medical clearance and detox. She has been drinking daily for the past 2.5 years after finding a friend of hers hanging in her garage. She has tried to detox on her own without success and has had seizures before from withdrawal. She has been told that her liver enzymes are very high and her liver is starting to fail. She has contacted a 6 month rehab program in [place] who requires her to have medical clearance prior to accepting her. She is frequently admitted to [facility] for alcohol withdrawal. She was also told at an urgent care that she has a bad UTI, but never filled her Rx. She states her urine is usually black, but she has been drinking a lot of water and now it is clear. She vomits after 5 hours of not drinking, started vomiting at 5 am, last vomited at 7 am. She notes her vomitus is always streaked with blood. She denies any abdominal pain, chest pain, SOB. She states that since she started drinking her voice has changed and she does not know why, but believes it may be from  mold exposure in her apartment. She requests ativan for her withdrawal symptoms.

## 2019-11-09 NOTE — ED PROVIDER NOTE - NS ED ROS FT
Const: + anxiety. Denies fever, chills  HEENT: Denies blurry vision, sore throat  Neck: Denies neck pain/stiffness  Resp: Denies coughing, SOB  Cardiovascular: Denies CP, palpitations, LE edema  GI: + nausea, + vomiting. abdominal pain, diarrhea, constipation, blood in stool  : Denies urinary frequency/urgency/dysuria, hematuria  MSK: Denies back pain  Neuro: + tremors. Denies HA, dizziness, numbness, weakness  Skin: Denies rashes.

## 2019-11-09 NOTE — ED ADULT NURSE REASSESSMENT NOTE - NS ED NURSE REASSESS COMMENT FT1
Received pt from [facility]. PT resting in stretcher MD Crystal at bedside. NAD noted at this time. CTM

## 2019-11-09 NOTE — ED ADULT NURSE NOTE - CAS ELECT INFOMATION PROVIDED
DC instructions/PT verbalized understanding of discharge instructions. PT verbalized understanding of prescribed medications. NAD noted

## 2019-11-09 NOTE — ED PROVIDER NOTE - CLINICAL SUMMARY MEDICAL DECISION MAKING FREE TEXT BOX
55 y/o F with EtOH abuse presents for medical clearance and inpatient detox in order to get into a rehab program in [place]. Last drink was last night. Mildly tremulous, hypertensive. Has been vomiting this morning, not vomiting now. Will check labs, EKG, give IV hydration, librium and check CIWA. Will plan for inpatient detox.

## 2019-11-09 NOTE — ED ADULT TRIAGE NOTE - CHIEF COMPLAINT QUOTE
sent from [facility] for alcohol detox. States she has been a daily drinker for the past 2.5 after experiencing trauma. recently in [place] 10 days ago but states she thinks she was d/c too fast. Last drink was 7pm last night. denies any SI at this time

## 2019-11-09 NOTE — ED ADULT NURSE REASSESSMENT NOTE - NS ED NURSE REASSESS COMMENT FT1
Pt remains anxious and wandering unit, denies any c/o pain, VS WNL, no acute s/s of respiratory distress noted or reported at this time, will continue to monitor

## 2019-11-09 NOTE — CHART NOTE - NSCHARTNOTEFT_GEN_A_CORE
SWNote: p/c from pt's EDMD(Talha) to inform worker that pt is requesting detox program . Worker called [facility] (Kiersten) to request bed, per kiersten all beds taken for the evening .Maybe tomorrow if there is a 'no show'.  Worker encouraged Dr Crystal to contact [facility] to present case for possible bed, agreed to do so. SW to follow .

## 2019-11-09 NOTE — ED PROVIDER NOTE - PHYSICAL EXAMINATION
Const: Awake, alert and oriented. In no acute distress. Well appearing. Tearful.  HEENT: NC/AT. Moist mucous membranes. Posterior oropharynx clear. Uvula midline without edema.  Eyes: No scleral icterus. EOMI.  Neck:. Soft and supple. Full ROM without pain.  Cardiac: Regular rate and regular rhythm. +S1/S2. No murmurs. Peripheral pulses 2+ and symmetric. No LE edema.  Resp: Thoracic edema. Speaking in full sentences. No evidence of respiratory distress. No wheezes, rales or rhonchi.  Abd: Soft, non-tender, non-distended. Normal bowel sounds in all 4 quadrants. No guarding or rebound.  Back: Spine midline and non-tender. No CVAT.  Skin: No rashes, abrasions or lacerations.  Lymph: No cervical lymphadenopathy.  Neuro: Awake, alert & oriented x 3. Moves all extremities symmetrically. Mildly tremulous.

## 2019-11-09 NOTE — ED ADULT NURSE NOTE - CHIEF COMPLAINT QUOTE
sent from [facility] for alcohol detox. States she has been a daily drinker for the past 2.5 after experiencing trauma. recently in [place] 10 days ago but states she thinks she was d/c too fast. Last drink was 7pm last night. denies any SI at this time

## 2019-11-13 ENCOUNTER — EMERGENCY (EMERGENCY)
Facility: HOSPITAL | Age: 54
LOS: 0 days | Discharge: HOME | End: 2019-11-14
Attending: EMERGENCY MEDICINE
Payer: MEDICAID

## 2019-11-13 VITALS
RESPIRATION RATE: 19 BRPM | TEMPERATURE: 98 F | WEIGHT: 126.1 LBS | SYSTOLIC BLOOD PRESSURE: 117 MMHG | DIASTOLIC BLOOD PRESSURE: 71 MMHG | OXYGEN SATURATION: 97 % | HEART RATE: 103 BPM | HEIGHT: 62 IN

## 2019-11-13 DIAGNOSIS — R51 HEADACHE: ICD-10-CM

## 2019-11-13 DIAGNOSIS — I10 ESSENTIAL (PRIMARY) HYPERTENSION: ICD-10-CM

## 2019-11-13 PROCEDURE — 99283 EMERGENCY DEPT VISIT LOW MDM: CPT

## 2019-11-13 PROCEDURE — 99282 EMERGENCY DEPT VISIT SF MDM: CPT

## 2019-11-13 NOTE — ED ADULT TRIAGE NOTE - CHIEF COMPLAINT QUOTE
patient presents to ed via ems, as per ems patient was complaining of hypertension and a headache, as per ems bp was 144/92 when arrived on scene.

## 2019-11-14 NOTE — ED PROVIDER NOTE - PATIENT PORTAL LINK FT
You can access the FollowMyHealth Patient Portal offered by [facility] by registering at the following website: [email or web address]. By joining [employer]’s FollowMyHealth portal, you will also be able to view your health information using other applications (apps) compatible with our system.

## 2019-11-14 NOTE — ED PROVIDER NOTE - OBJECTIVE STATEMENT
53 y/o female in ED c/o HA and HTN at home now resolved.    pt states that she took her librium PTA.    pt denies any fever, cp, sob, n/v/d/abd pain.    tolerating PO.   pt states that she wants to see a hospitalist.

## 2019-11-14 NOTE — ED ADULT NURSE NOTE - OBJECTIVE STATEMENT
patient presents to ED BIB EMS and pt was complaining of hypertension and a headache, as per ems bp was 144/92 when arrived on scene. pt argumentative and combative. demanding to see "someone in charge." states that she does not want to be seen today in the ED and that she would choose to get picked up by her son and go to [facility]. she feels like she is treated unfairly here.

## 2019-12-01 ENCOUNTER — EMERGENCY (EMERGENCY)
Facility: HOSPITAL | Age: 54
LOS: 0 days | Discharge: HOME | End: 2019-12-01
Attending: EMERGENCY MEDICINE
Payer: MEDICAID

## 2019-12-01 VITALS
HEART RATE: 76 BPM | TEMPERATURE: 98 F | SYSTOLIC BLOOD PRESSURE: 139 MMHG | OXYGEN SATURATION: 97 % | HEIGHT: 64 IN | WEIGHT: 145.06 LBS | DIASTOLIC BLOOD PRESSURE: 76 MMHG | RESPIRATION RATE: 20 BRPM

## 2019-12-01 DIAGNOSIS — F10.10 ALCOHOL ABUSE, UNCOMPLICATED: ICD-10-CM

## 2019-12-01 DIAGNOSIS — F41.9 ANXIETY DISORDER, UNSPECIFIED: ICD-10-CM

## 2019-12-01 DIAGNOSIS — M25.571 PAIN IN RIGHT ANKLE AND JOINTS OF RIGHT FOOT: ICD-10-CM

## 2019-12-01 DIAGNOSIS — G40.909 EPILEPSY, UNSPECIFIED, NOT INTRACTABLE, WITHOUT STATUS EPILEPTICUS: ICD-10-CM

## 2019-12-01 DIAGNOSIS — I10 ESSENTIAL (PRIMARY) HYPERTENSION: ICD-10-CM
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
## 2019-12-01 PROCEDURE — 73610 X-RAY EXAM OF ANKLE: CPT | Mod: RT

## 2019-12-01 PROCEDURE — 73630 X-RAY EXAM OF FOOT: CPT | Mod: RT

## 2019-12-01 PROCEDURE — 99284 EMERGENCY DEPT VISIT MOD MDM: CPT | Mod: 25

## 2019-12-01 PROCEDURE — 70450 CT HEAD/BRAIN W/O DYE: CPT

## 2019-12-01 PROCEDURE — 73630 X-RAY EXAM OF FOOT: CPT | Mod: 26,RT

## 2019-12-01 PROCEDURE — 73590 X-RAY EXAM OF LOWER LEG: CPT | Mod: RT

## 2019-12-01 PROCEDURE — 99284 EMERGENCY DEPT VISIT MOD MDM: CPT

## 2019-12-01 PROCEDURE — 73610 X-RAY EXAM OF ANKLE: CPT | Mod: 26,RT

## 2019-12-01 PROCEDURE — 73590 X-RAY EXAM OF LOWER LEG: CPT | Mod: 26,RT

## 2019-12-01 PROCEDURE — 70450 CT HEAD/BRAIN W/O DYE: CPT | Mod: 26

## 2019-12-01 RX ORDER — HYDROXYZINE HCL 10 MG
25 TABLET ORAL ONCE
Refills: 0 | Status: COMPLETED | OUTPATIENT
Start: 2019-12-01 | End: 2019-12-01

## 2019-12-01 RX ORDER — ONDANSETRON 8 MG/1
8 TABLET, FILM COATED ORAL ONCE
Refills: 0 | Status: DISCONTINUED | OUTPATIENT
Start: 2019-12-01 | End: 2019-12-01

## 2019-12-01 RX ADMIN — Medication 25 MILLIGRAM(S): at 19:53

## 2019-12-01 NOTE — ED PROVIDER NOTE - PATIENT PORTAL LINK FT
You can access the FollowMyHealth Patient Portal offered by [facility] by registering at the following website: [email or web address]. By joining [employer]’s FollowMyHealth portal, you will also be able to view your health information using other applications (apps) compatible with our system.

## 2019-12-01 NOTE — ED ADULT TRIAGE NOTE - CHIEF COMPLAINT QUOTE
Pt BIBEMS for right foot/ankle injury/deformity after fall at home while intoxicated. Ecchymotic area to top lateral area of right foot. Unable to ambulate on it per patient

## 2019-12-01 NOTE — ED PROVIDER NOTE - SKIN, MLM
Skin normal color for race, warm, dry and intact. No evidence of rash. Ecchymosis to right dorsum of foot.

## 2019-12-01 NOTE — ED PROVIDER NOTE - OBJECTIVE STATEMENT
53 y/o female with a PMHx of anxiety, EtOH abuse, HTN, subdural hematoma, presents to the ED s/p right ankle injury. Pt notes she was home alone, had a seizure, and woke up with ecchymosis to her right foot. Pt notes she has been trying to self detox. No other complaints at this time.

## 2019-12-01 NOTE — ED ADULT NURSE NOTE - NSIMPLEMENTINTERV_GEN_ALL_ED
Implemented All Fall Risk Interventions:  [place] to call system. Call bell, personal items and telephone within reach. Instruct patient to call for assistance. Room bathroom lighting operational. Non-slip footwear when patient is off stretcher. Physically safe environment: no spills, clutter or unnecessary equipment. Stretcher in lowest position, wheels locked, appropriate side rails in place. Provide visual cue, wrist band, yellow gown, etc. Monitor gait and stability. Monitor for mental status changes and reorient to person, place, and time. Review medications for side effects contributing to fall risk. Reinforce activity limits and safety measures with patient and family.

## 2019-12-01 NOTE — ED ADULT NURSE NOTE - OBJECTIVE STATEMENT
Pt BIBA VSS s/p fall + right ankle injury, + swelling, pt initially angry refused to answer questions, after speaking with pt & de esculating situation, pt states she had a drink of ETOH today at 3pm, doesn't recall falling, c/o HA, pain in back of head, MD Echols aware of pt status, pt also states she needs Ativan for withdrawals from ETOH. Pt able to ambulate with steady gait. Denies chest pain, shortness of breath.

## 2019-12-01 NOTE — ED PROVIDER NOTE - PROGRESS NOTE DETAILS
PT continuously asking for librium or ativan while in ED for "preemptive seizure/shaking". Pt states she usually gets scripts for days of librium or ativan and wants medication. I informed patient there is no grounds for withdrawal treatment as she is not having withdrawal in the hours she has been in Ed. Vitals wnl.

## 2019-12-03 ENCOUNTER — EMERGENCY (EMERGENCY)
Facility: HOSPITAL | Age: 54
LOS: 0 days | Discharge: HOME | End: 2019-12-03
Attending: EMERGENCY MEDICINE
Payer: MEDICAID

## 2019-12-03 VITALS
TEMPERATURE: 98 F | DIASTOLIC BLOOD PRESSURE: 97 MMHG | OXYGEN SATURATION: 100 % | HEART RATE: 101 BPM | RESPIRATION RATE: 18 BRPM | HEIGHT: 64 IN | SYSTOLIC BLOOD PRESSURE: 142 MMHG

## 2019-12-03 DIAGNOSIS — S92.354A NONDISPLACED FRACTURE OF FIFTH METATARSAL BONE, RIGHT FOOT, INITIAL ENCOUNTER FOR CLOSED FRACTURE: ICD-10-CM

## 2019-12-03 DIAGNOSIS — Z86.59 PERSONAL HISTORY OF OTHER MENTAL AND BEHAVIORAL DISORDERS: ICD-10-CM

## 2019-12-03 DIAGNOSIS — Z98.891 HISTORY OF UTERINE SCAR FROM PREVIOUS SURGERY: Chronic | ICD-10-CM

## 2019-12-03 DIAGNOSIS — Z79.899 OTHER LONG TERM (CURRENT) DRUG THERAPY: ICD-10-CM

## 2019-12-03 DIAGNOSIS — Z98.890 OTHER SPECIFIED POSTPROCEDURAL STATES: Chronic | ICD-10-CM
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
## 2019-12-03 DIAGNOSIS — S69.81XA OTHER SPECIFIED INJURIES OF RIGHT WRIST, HAND AND FINGER(S), INITIAL ENCOUNTER: ICD-10-CM

## 2019-12-03 DIAGNOSIS — Z90.89 ACQUIRED ABSENCE OF OTHER ORGANS: Chronic | ICD-10-CM
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
## 2019-12-03 DIAGNOSIS — F43.10 POST-TRAUMATIC STRESS DISORDER, UNSPECIFIED: ICD-10-CM

## 2019-12-03 PROCEDURE — 73630 X-RAY EXAM OF FOOT: CPT | Mod: RT

## 2019-12-03 PROCEDURE — 73552 X-RAY EXAM OF FEMUR 2/>: CPT | Mod: 26,RT

## 2019-12-03 PROCEDURE — 73552 X-RAY EXAM OF FEMUR 2/>: CPT | Mod: RT

## 2019-12-03 PROCEDURE — 73630 X-RAY EXAM OF FOOT: CPT | Mod: 26,RT

## 2019-12-03 PROCEDURE — 99283 EMERGENCY DEPT VISIT LOW MDM: CPT

## 2019-12-03 PROCEDURE — 28470 CLTX METATARSAL FX WO MNP EA: CPT | Mod: RT

## 2019-12-03 PROCEDURE — 99285 EMERGENCY DEPT VISIT HI MDM: CPT | Mod: 25

## 2019-12-03 RX ORDER — IBUPROFEN 200 MG
600 TABLET ORAL ONCE
Refills: 0 | Status: COMPLETED | OUTPATIENT
Start: 2019-12-03 | End: 2019-12-03

## 2019-12-03 RX ORDER — IBUPROFEN 200 MG
1 TABLET ORAL
Qty: 9 | Refills: 0
Start: 2019-12-03 | End: 2019-12-05

## 2019-12-03 RX ADMIN — Medication 600 MILLIGRAM(S): at 18:21

## 2019-12-03 NOTE — ED PROVIDER NOTE - PATIENT PORTAL LINK FT
You can access the FollowMyHealth Patient Portal offered by [facility] by registering at the following website: [email or web address]. By joining [employer]s FollowMyHealth portal, you will also be able to view your health information using other applications (apps) compatible with our system.

## 2019-12-03 NOTE — ED ADULT TRIAGE NOTE - CHIEF COMPLAINT QUOTE
Pt brought in by ambulance from home with primary complaint of right foot pain, states that "Dr. Vela" told her to come back to hospital because of fractures in right foot.  Pt reports that she tripped over her bulldog two days ago and injured right foot, was seen afterward at .  Pt with multiple complaints, cursing at staff members on arrival, complains "they treat me like shit here," "they treat me like an alcoholic idiot."

## 2019-12-03 NOTE — ED POST DISCHARGE NOTE - RESULT SUMMARY
+5th metatarsal fracture on XR. Patient made aware. Pt requesting transportation to return to ED. Attempting to arrange at this time. - Red Sandhu PA-C

## 2019-12-03 NOTE — ED PROVIDER NOTE - OBJECTIVE STATEMENT
53 y/o female with PMHx of anxiety, PTSD, HTN, subdural hematoma, and ETOH abuse presents to the ED BIBEMS c/o +right foot pain. Reports she fell 2 days ago and came to ED for the pain, was XR and d/c home. Today pt was called back for right foot fx. Pt endorses right foot +edema and +ecchymosis, as well as +difficulty bearing weight. Reports drinking a glass of wine at 12 PM and another at 3:30 PM. No fever. NKDA. No PMD.

## 2019-12-03 NOTE — ED ADULT NURSE REASSESSMENT NOTE - NS ED NURSE REASSESS COMMENT FT1
Patient assisted to bathroom.  Podiatry here to see patient at this time. Plan of care expressed with patient at this time.   Patient expressing dissatisfaction with prior care in the ED.  Patient to be non-weight-bearing.  Will continue to monitor patient.
Pt up off stretcher and walking to nursing station with loud tone of voice approaching MD. Pt was asked to sit back on stretcher, refusing. Pt making comments about the crutches she was issued that they are not appropriate for her, crutches adjusted and attempted to provide instruction. Reiterated to patient about non-weight bearing status and pt refused to sit back on stretcher. Pt placed in wheelchair, security called to bedside do to escalating agitation , pt discharged per Dr Le and escorted to [facility].
Received patient into Room 8, aggressive, agitated requiring redirection for multiple attempts to get out of bed with no assistance. Pt is shouting out "I cannot help it that I turned to drinking wine". Also making statements about individuals she cannot name and that "they are making fun of me". Pt expresses frustration in a repetitive way, "I was told there's nothing wrong with me and here I am back in the ER for my foot".   Pt requires constant redirection and de-escalation.

## 2019-12-03 NOTE — ED PROVIDER NOTE - PROGRESS NOTE DETAILS
Jaciel PAPPAS for ED attending, Dr. Le: Spoke with podiatry resident regarding pt's right 5th metatarsal fx, will consult. pt splinted by podiatry. pt refusing femur xray. pt on phone for ride home.  pt also requesting to speak to admin regarding suing the hospital. MD LUIZA pt c/o pain radiating from her broken foot to her thigh to podiatry resident.  no evidence of hematoma or edema on thigh. femur xr ordered. xray tech came for xray. pt refused. MD LUIZA

## 2019-12-03 NOTE — ED ADULT NURSE NOTE - OBJECTIVE STATEMENT
Patient presents to the ED via ambulance states that she was called back for a positive fracture on her x-ray.  Patient has a history of alcoholism and states she is trying to detox herself.  Patient speech is rapid  and she states that she told them that the it was very painful.  Patient states that she was not provided with crutches or a splint when she left the other day.  Patient injured her foot 2 nights ago at home.  Patient states she had some alcohol today to calm her nerves.  Patient states she has used Alleve since injury with no relief.  Patient denies use of any other substances at this time.  CBD oil used.

## 2019-12-03 NOTE — ED ADULT NURSE NOTE - ISOLATION TYPE:
Patient ID:  Zayra Coles  [id number]  28 y.o.  1995    Admit Date: 2017    Discharge Date: 2017     Admitting Physician: Thomas Ervin MD  Attending Physician: Gabby Bassett DO    Admission Diagnoses:   Post-partum HTN    Procedures for this admission:   None    Hospital Course: Uncomplicated. Increased meds for HTN    Discharge Diagnoses: Same as above    Discharge Disposition:  Home    Discharge Condition:  Stable    Additional Diagnoses: pregnancy induced hypertension. Maternal Labs:   Lab Results   Component Value Date/Time    HBsAg, External negative 2017    HIV, External negative 2017    Rubella, External immune 2017    GrBStrep, External positive 2017       Cord Blood Results: This patient has no babies on file. History of Present Illness:   OB History      Para Term  AB Living    1 1 1   1    SAB TAB Ectopic Molar Multiple Live Births        0 1        Admitted for postpartum HTN exacerbation. Hospital Course:   Patient was admitted as above. Please the chart for details. The postpartum course was unremarkable. She was deemed stable for discharge home on day 2.     Follow up with Dr. Sonia Velasquez MD in 6 days         Signed:  Radha Masters MD  2017  8:30 AM None

## 2019-12-04 ENCOUNTER — EMERGENCY (EMERGENCY)
Facility: HOSPITAL | Age: 54
LOS: 0 days | Discharge: HOME | End: 2019-12-04
Attending: EMERGENCY MEDICINE
Payer: MEDICAID

## 2019-12-04 VITALS
SYSTOLIC BLOOD PRESSURE: 120 MMHG | DIASTOLIC BLOOD PRESSURE: 87 MMHG | WEIGHT: 160.06 LBS | HEART RATE: 96 BPM | TEMPERATURE: 98 F | HEIGHT: 64 IN | RESPIRATION RATE: 20 BRPM | OXYGEN SATURATION: 100 %

## 2019-12-04 DIAGNOSIS — Z98.890 OTHER SPECIFIED POSTPROCEDURAL STATES: Chronic | ICD-10-CM

## 2019-12-04 DIAGNOSIS — I10 ESSENTIAL (PRIMARY) HYPERTENSION: ICD-10-CM

## 2019-12-04 DIAGNOSIS — Z98.891 HISTORY OF UTERINE SCAR FROM PREVIOUS SURGERY: Chronic | ICD-10-CM

## 2019-12-04 DIAGNOSIS — F43.10 POST-TRAUMATIC STRESS DISORDER, UNSPECIFIED: ICD-10-CM
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
## 2019-12-04 DIAGNOSIS — M79.661 PAIN IN RIGHT LOWER LEG: ICD-10-CM

## 2019-12-04 DIAGNOSIS — F41.9 ANXIETY DISORDER, UNSPECIFIED: ICD-10-CM

## 2019-12-04 DIAGNOSIS — Z90.89 ACQUIRED ABSENCE OF OTHER ORGANS: Chronic | ICD-10-CM

## 2019-12-04 PROCEDURE — 99284 EMERGENCY DEPT VISIT MOD MDM: CPT

## 2019-12-04 PROCEDURE — 99283 EMERGENCY DEPT VISIT LOW MDM: CPT

## 2019-12-04 NOTE — ED PROVIDER NOTE - PROGRESS NOTE DETAILS
case d/w [facility] and states no femur fx noted on XR.  pt told of results and advised to f/u with podiatry

## 2019-12-04 NOTE — ED PROVIDER NOTE - PATIENT PORTAL LINK FT
You can access the FollowMyHealth Patient Portal offered by [facility] by registering at the following website: [email or web address]. By joining [employer]’s FollowMyHealth portal, you will also be able to view your health information using other applications (apps) compatible with our system.

## 2019-12-04 NOTE — ED PROVIDER NOTE - NSFOLLOWUPINSTRUCTIONS_ED_ALL_ED_FT
please follow up with podiatry and your doctor as directed.     keep leg elevated.    take motrin and tylenol for pain.    return to ED for any concerns

## 2019-12-04 NOTE — ED PROVIDER NOTE - OBJECTIVE STATEMENT
55 y/o female in ED stating that she received call to return to ED for femur fx.    pt states that she fell 2 days ago and had XRs done.   states seen in ED earlier today after receiving call to return to ED for a foot fx.   pt states returned to ED and had podiatry placed a splint on her right foot.   states had XR of her femur done before being d/c.    states got home and then received another call to return to ED.    pt states still with pain.   denies any new trauma

## 2019-12-04 NOTE — ED PROVIDER NOTE - SKIN, MLM
Skin normal color for race, warm, dry and intact. No evidence of rash.  visible splint in place right foot

## 2020-01-01 NOTE — ED BEHAVIORAL HEALTH ASSESSMENT NOTE - NS ED BHA MSE SPEECH ARTICULATION
Speech Therapy Daily Treatment  Infant Feeding/Swallow     Admitting diagnosis:  , gestational age 35 completed weeks [P07.38]   YOB: 2021, 2 week old   Corrected gestational age:37w 0d  Birth Weight: 4 lb 9.6 oz (2085 g)  Current hospitalization required due to the following medical needs:  Active Problems:    Prematurity, 2,000-2,499 grams, 35-36 completed weeks    Need for observation and evaluation of  for sepsis    Jaundice, , from prematurity    RDS (respiratory distress syndrome in the )    Slow feeding in   Resolved Problems:    * No resolved hospital problems. *    Medical changes or updates since last session: n/a  Po intake yesterday: 144/352mLs    SUBJECTIVE:   Mom at the bedside. RN reported infant bottle fed well at the last feeding and took 30mLs.     Pain before session:  N-PASS ( Pain, Agitation & Sedation Scale) Pain Assessment  Crying/Irritability 0 - No pain signs   Behavior State 0 - Appropriate for gestational age   Facial Expression  0 - No pain signs   Extremity Tone 0 - No pain signs   Vital Signs 0 - No pain signs   Premature Pain Score 0       OBJECTIVE:   Status at onset of session:   Physiologic: Stable autonomic behaviors including  stable heart rate, regular respirations, pink/stable color and appropriate oxygen saturations.  Motor: Stable motor behaviors including  sucking.  State: Infant was in a drowsy and quiet alert state. Stable state behaviors including appropriate responses.    Current Feeding: PO, NG, 46 mls q 3 hours  Respiratory Status: room air  Pain during session:  N-PASS ( Pain, Agitation & Sedation Scale) Pain Assessment  Crying/Irritability 0 - No pain signs   Behavior State 0 - Appropriate for gestational age   Facial Expression  0 - No pain signs   Extremity Tone 0 - No pain signs   Vital Signs 0 - No pain signs   Premature Pain Score 0     Treatment:   Oral Feeding: Infant was awake and  sucking on her pacifier prior to the feeding. As soon as mom positioned her into an elevated SL position infant's state decreased. Attempts to re-alert and stimulation infant to latch were unsuccessful. Infant's cues were followed and po feeding attempt was stopped. The feeding was gavaged.     Ongoing support and education provided to mom.     Stress Cues noted during oral feeding:   Autonomic- none  State: abrupt transitions between states  Motor: none    Feeding/GI comments: swaddle for bottle feedings, provide external pacing as needed    Family Centered Support/Education: Mom was present to share impressions and continue teaching.  Infant's feeding plan is posted bedside which includes ways to support oral feeding for this infant including the importance of reading and responding to infant stress cues during PO feeding attempts, positioning recommendations, and nipple recommendations.    ASSESSMENT:   Infant was actively sucking on her pacifier prior to the feeding but shut down as soon as the feeding was offered. Re-alerting strategies were unsuccessful. Feeding was gavaged.    Infant's feeding skills remain immature and continue to appear about a week younger than her CGA. She continues to require supplemental NGT nutritional support.     Impressions & Recommendations:    1. Cont cue based po feeding- breast and bottle  2. Encourage Mom to put infant to breast for breast feeding attempts at least once per day.   3. If infant begins to demonstrate improved sucking at breast, consider pre and post weights to assess for efficiency in transfer of milk  4. Encourage Mom to pump after she puts infant to breast  5. Cont supplemental NGT nutritional support  6. ST to provide ongoing feeding education to parents.     Aspiration Risk Mild and Moderate    Factors include: prematurity   Pacing consisent pacing every 3 sucks   Nipple Dr. Bowman's Ultra Preemie Nipple   Positioning Sidelying, Elevated   Safe feeding  strategies found effective Swaddle for feedings     Pain after session:  N-PASS ( Pain, Agitation & Sedation Scale) Pain Assessment  Crying/Irritability 0 - No pain signs   Behavior State 0 - Appropriate for gestational age   Facial Expression  0 - No pain signs   Extremity Tone 0 - No pain signs   Vital Signs 0 - No pain signs   Premature Pain Score 0     PLAN:   Suggestions for next session as indicated: Continue per feeding plan. Speech therapy to complete ongoing assessment of infant's po feeding skills and tolerance of oral intake making adjustments to the plan as indicated to maintain safe and effective oral feeding.    Discussed with: SANTINO Kelly.     Goals: to be met at time of discharge   1. Nipple with more appropriate state regulation to support intake of prescribed volume across feedings  2. Maintain organized and efficient suck during nipple feeding to support intake of necessary volume for weight gain across all feedings for at least a 72 hour period  3. Parent(s) will demonstrate appropriate use of strategies/ techniques to support nipple feeding (including positioning, bottle set up and use, pacing per cues)  4. Nipple feed without any adverse overt events    Frequency: 4X/week    Documentation on following flowsheet content as well: Infant flowsheet      Recommendations at discharge:   TBD   Normal

## 2020-02-17 NOTE — ED ADULT TRIAGE NOTE - PAIN: PRESENCE, MLM
Average glucose readings for the week:  79 MG/dL Fasting   131 MG/dL 1 hour after breakfast   135 MG/dL 1 hour after lunch   114 MG/dL 1 hour after supper     Currently taking:  Breakfast- Lispro 20 units and NPH 35 units  Supper- Lispro 30 units  Bedtime- NPH 15 units    Continue current insulin doses  
complains of pain/discomfort

## 2020-03-14 ENCOUNTER — INPATIENT (INPATIENT)
Facility: HOSPITAL | Age: 55
LOS: 0 days | Discharge: LEFT BEFORE DISCHARGE | DRG: 894 | End: 2020-03-15
Attending: INTERNAL MEDICINE | Admitting: INTERNAL MEDICINE
Payer: MEDICAID

## 2020-03-14 VITALS
HEART RATE: 100 BPM | HEIGHT: 62 IN | RESPIRATION RATE: 18 BRPM | DIASTOLIC BLOOD PRESSURE: 99 MMHG | OXYGEN SATURATION: 100 % | SYSTOLIC BLOOD PRESSURE: 158 MMHG | WEIGHT: 128.97 LBS | TEMPERATURE: 99 F

## 2020-03-14 DIAGNOSIS — Z98.890 OTHER SPECIFIED POSTPROCEDURAL STATES: Chronic | ICD-10-CM

## 2020-03-14 DIAGNOSIS — Z90.89 ACQUIRED ABSENCE OF OTHER ORGANS: Chronic | ICD-10-CM

## 2020-03-14 DIAGNOSIS — F10.239 ALCOHOL DEPENDENCE WITH WITHDRAWAL, UNSPECIFIED: ICD-10-CM

## 2020-03-14 DIAGNOSIS — Z98.891 HISTORY OF UTERINE SCAR FROM PREVIOUS SURGERY: Chronic | ICD-10-CM

## 2020-03-14 PROBLEM — F43.10 POST-TRAUMATIC STRESS DISORDER, UNSPECIFIED: Chronic | Status: ACTIVE | Noted: 2019-12-03

## 2020-03-14 LAB
ALBUMIN SERPL ELPH-MCNC: 5.1 G/DL — SIGNIFICANT CHANGE UP (ref 3.3–5.2)
ALP SERPL-CCNC: 164 U/L — HIGH (ref 40–120)
ALT FLD-CCNC: 65 U/L — HIGH
AMPHET UR-MCNC: NEGATIVE — SIGNIFICANT CHANGE UP
ANION GAP SERPL CALC-SCNC: 15 MMOL/L — SIGNIFICANT CHANGE UP (ref 5–17)
APAP SERPL-MCNC: <7.5 UG/ML — LOW (ref 10–26)
APTT BLD: 30.5 SEC — SIGNIFICANT CHANGE UP (ref 27.5–36.3)
AST SERPL-CCNC: 85 U/L — HIGH
BARBITURATES UR SCN-MCNC: NEGATIVE — SIGNIFICANT CHANGE UP
BASOPHILS # BLD AUTO: 0.02 K/UL — SIGNIFICANT CHANGE UP (ref 0–0.2)
BASOPHILS NFR BLD AUTO: 0.3 % — SIGNIFICANT CHANGE UP (ref 0–2)
BENZODIAZ UR-MCNC: POSITIVE
BILIRUB SERPL-MCNC: 0.5 MG/DL — SIGNIFICANT CHANGE UP (ref 0.4–2)
BUN SERPL-MCNC: 10 MG/DL — SIGNIFICANT CHANGE UP (ref 8–20)
CALCIUM SERPL-MCNC: 10 MG/DL — SIGNIFICANT CHANGE UP (ref 8.6–10.2)
CHLORIDE SERPL-SCNC: 97 MMOL/L — LOW (ref 98–107)
CO2 SERPL-SCNC: 25 MMOL/L — SIGNIFICANT CHANGE UP (ref 22–29)
COCAINE METAB.OTHER UR-MCNC: NEGATIVE — SIGNIFICANT CHANGE UP
CREAT SERPL-MCNC: 0.5 MG/DL — SIGNIFICANT CHANGE UP (ref 0.5–1.3)
EOSINOPHIL # BLD AUTO: 0.07 K/UL — SIGNIFICANT CHANGE UP (ref 0–0.5)
EOSINOPHIL NFR BLD AUTO: 1.1 % — SIGNIFICANT CHANGE UP (ref 0–6)
ETHANOL SERPL-MCNC: <10 MG/DL — SIGNIFICANT CHANGE UP
GLUCOSE SERPL-MCNC: 110 MG/DL — HIGH (ref 70–99)
HCG SERPL-ACNC: <4 MIU/ML — SIGNIFICANT CHANGE UP
HCT VFR BLD CALC: 41.3 % — SIGNIFICANT CHANGE UP (ref 34.5–45)
HCT VFR BLD CALC: 46.1 % — HIGH (ref 34.5–45)
HGB BLD-MCNC: 13.4 G/DL — SIGNIFICANT CHANGE UP (ref 11.5–15.5)
HGB BLD-MCNC: 15.4 G/DL — SIGNIFICANT CHANGE UP (ref 11.5–15.5)
IMM GRANULOCYTES NFR BLD AUTO: 0.2 % — SIGNIFICANT CHANGE UP (ref 0–1.5)
INR BLD: 0.9 RATIO — SIGNIFICANT CHANGE UP (ref 0.88–1.16)
LACTATE BLDV-MCNC: 1.8 MMOL/L — SIGNIFICANT CHANGE UP (ref 0.5–2)
LIDOCAIN IGE QN: 81 U/L — HIGH (ref 22–51)
LYMPHOCYTES # BLD AUTO: 1.02 K/UL — SIGNIFICANT CHANGE UP (ref 1–3.3)
LYMPHOCYTES # BLD AUTO: 16.1 % — SIGNIFICANT CHANGE UP (ref 13–44)
MAGNESIUM SERPL-MCNC: 2.2 MG/DL — SIGNIFICANT CHANGE UP (ref 1.6–2.6)
MCHC RBC-ENTMCNC: 32.4 GM/DL — SIGNIFICANT CHANGE UP (ref 32–36)
MCHC RBC-ENTMCNC: 33.4 GM/DL — SIGNIFICANT CHANGE UP (ref 32–36)
MCHC RBC-ENTMCNC: 33.5 PG — SIGNIFICANT CHANGE UP (ref 27–34)
MCHC RBC-ENTMCNC: 33.7 PG — SIGNIFICANT CHANGE UP (ref 27–34)
MCV RBC AUTO: 100.9 FL — HIGH (ref 80–100)
MCV RBC AUTO: 103.3 FL — HIGH (ref 80–100)
METHADONE UR-MCNC: NEGATIVE — SIGNIFICANT CHANGE UP
MONOCYTES # BLD AUTO: 0.53 K/UL — SIGNIFICANT CHANGE UP (ref 0–0.9)
MONOCYTES NFR BLD AUTO: 8.3 % — SIGNIFICANT CHANGE UP (ref 2–14)
NEUTROPHILS # BLD AUTO: 4.7 K/UL — SIGNIFICANT CHANGE UP (ref 1.8–7.4)
NEUTROPHILS NFR BLD AUTO: 74 % — SIGNIFICANT CHANGE UP (ref 43–77)
OPIATES UR-MCNC: NEGATIVE — SIGNIFICANT CHANGE UP
PCP SPEC-MCNC: SIGNIFICANT CHANGE UP
PCP UR-MCNC: NEGATIVE — SIGNIFICANT CHANGE UP
PHOSPHATE SERPL-MCNC: 2.8 MG/DL — SIGNIFICANT CHANGE UP (ref 2.4–4.7)
PLATELET # BLD AUTO: 233 K/UL — SIGNIFICANT CHANGE UP (ref 150–400)
PLATELET # BLD AUTO: 293 K/UL — SIGNIFICANT CHANGE UP (ref 150–400)
POTASSIUM SERPL-MCNC: 4.7 MMOL/L — SIGNIFICANT CHANGE UP (ref 3.5–5.3)
POTASSIUM SERPL-SCNC: 4.7 MMOL/L — SIGNIFICANT CHANGE UP (ref 3.5–5.3)
PROT SERPL-MCNC: 8.4 G/DL — SIGNIFICANT CHANGE UP (ref 6.6–8.7)
PROTHROM AB SERPL-ACNC: 10.1 SEC — SIGNIFICANT CHANGE UP (ref 10–12.9)
RBC # BLD: 4 M/UL — SIGNIFICANT CHANGE UP (ref 3.8–5.2)
RBC # BLD: 4.57 M/UL — SIGNIFICANT CHANGE UP (ref 3.8–5.2)
RBC # FLD: 12.6 % — SIGNIFICANT CHANGE UP (ref 10.3–14.5)
RBC # FLD: 12.7 % — SIGNIFICANT CHANGE UP (ref 10.3–14.5)
SALICYLATES SERPL-MCNC: <0.6 MG/DL — LOW (ref 10–20)
SODIUM SERPL-SCNC: 137 MMOL/L — SIGNIFICANT CHANGE UP (ref 135–145)
THC UR QL: NEGATIVE — SIGNIFICANT CHANGE UP
WBC # BLD: 6.35 K/UL — SIGNIFICANT CHANGE UP (ref 3.8–10.5)
WBC # BLD: 6.37 K/UL — SIGNIFICANT CHANGE UP (ref 3.8–10.5)
WBC # FLD AUTO: 6.35 K/UL — SIGNIFICANT CHANGE UP (ref 3.8–10.5)
WBC # FLD AUTO: 6.37 K/UL — SIGNIFICANT CHANGE UP (ref 3.8–10.5)

## 2020-03-14 PROCEDURE — 99285 EMERGENCY DEPT VISIT HI MDM: CPT

## 2020-03-14 PROCEDURE — 99223 1ST HOSP IP/OBS HIGH 75: CPT

## 2020-03-14 PROCEDURE — 70450 CT HEAD/BRAIN W/O DYE: CPT | Mod: 26

## 2020-03-14 PROCEDURE — 74174 CTA ABD&PLVS W/CONTRAST: CPT | Mod: 26

## 2020-03-14 RX ORDER — PANTOPRAZOLE SODIUM 20 MG/1
80 TABLET, DELAYED RELEASE ORAL ONCE
Refills: 0 | Status: COMPLETED | OUTPATIENT
Start: 2020-03-14 | End: 2020-03-14

## 2020-03-14 RX ORDER — PANTOPRAZOLE SODIUM 20 MG/1
8 TABLET, DELAYED RELEASE ORAL
Qty: 80 | Refills: 0 | Status: DISCONTINUED | OUTPATIENT
Start: 2020-03-14 | End: 2020-03-14

## 2020-03-14 RX ORDER — PANTOPRAZOLE SODIUM 20 MG/1
40 TABLET, DELAYED RELEASE ORAL
Refills: 0 | Status: DISCONTINUED | OUTPATIENT
Start: 2020-03-14 | End: 2020-03-15

## 2020-03-14 RX ORDER — THIAMINE MONONITRATE (VIT B1) 100 MG
100 TABLET ORAL DAILY
Refills: 0 | Status: DISCONTINUED | OUTPATIENT
Start: 2020-03-14 | End: 2020-03-15

## 2020-03-14 RX ORDER — ONDANSETRON 8 MG/1
8 TABLET, FILM COATED ORAL ONCE
Refills: 0 | Status: COMPLETED | OUTPATIENT
Start: 2020-03-14 | End: 2020-03-14

## 2020-03-14 RX ORDER — FOLIC ACID 0.8 MG
1 TABLET ORAL DAILY
Refills: 0 | Status: DISCONTINUED | OUTPATIENT
Start: 2020-03-14 | End: 2020-03-15

## 2020-03-14 RX ORDER — SODIUM CHLORIDE 9 MG/ML
2000 INJECTION INTRAMUSCULAR; INTRAVENOUS; SUBCUTANEOUS ONCE
Refills: 0 | Status: COMPLETED | OUTPATIENT
Start: 2020-03-14 | End: 2020-03-14

## 2020-03-14 RX ORDER — ATORVASTATIN CALCIUM 80 MG/1
40 TABLET, FILM COATED ORAL AT BEDTIME
Refills: 0 | Status: DISCONTINUED | OUTPATIENT
Start: 2020-03-14 | End: 2020-03-15

## 2020-03-14 RX ORDER — ACETAMINOPHEN 500 MG
975 TABLET ORAL ONCE
Refills: 0 | Status: COMPLETED | OUTPATIENT
Start: 2020-03-14 | End: 2020-03-14

## 2020-03-14 RX ORDER — PANTOPRAZOLE SODIUM 20 MG/1
40 TABLET, DELAYED RELEASE ORAL
Refills: 0 | Status: DISCONTINUED | OUTPATIENT
Start: 2020-03-14 | End: 2020-03-14

## 2020-03-14 RX ORDER — FOLIC ACID 0.8 MG
1 TABLET ORAL ONCE
Refills: 0 | Status: COMPLETED | OUTPATIENT
Start: 2020-03-14 | End: 2020-03-14

## 2020-03-14 RX ORDER — ASPIRIN/CALCIUM CARB/MAGNESIUM 324 MG
81 TABLET ORAL DAILY
Refills: 0 | Status: DISCONTINUED | OUTPATIENT
Start: 2020-03-14 | End: 2020-03-15

## 2020-03-14 RX ORDER — MIDAZOLAM HYDROCHLORIDE 1 MG/ML
1 INJECTION, SOLUTION INTRAMUSCULAR; INTRAVENOUS ONCE
Refills: 0 | Status: DISCONTINUED | OUTPATIENT
Start: 2020-03-14 | End: 2020-03-14

## 2020-03-14 RX ORDER — THIAMINE MONONITRATE (VIT B1) 100 MG
100 TABLET ORAL ONCE
Refills: 0 | Status: COMPLETED | OUTPATIENT
Start: 2020-03-14 | End: 2020-03-14

## 2020-03-14 RX ORDER — ONDANSETRON 8 MG/1
8 TABLET, FILM COATED ORAL
Refills: 0 | Status: DISCONTINUED | OUTPATIENT
Start: 2020-03-14 | End: 2020-03-15

## 2020-03-14 RX ADMIN — Medication 2 MILLIGRAM(S): at 23:58

## 2020-03-14 RX ADMIN — Medication 2 MILLIGRAM(S): at 23:15

## 2020-03-14 RX ADMIN — ATORVASTATIN CALCIUM 40 MILLIGRAM(S): 80 TABLET, FILM COATED ORAL at 22:07

## 2020-03-14 RX ADMIN — ONDANSETRON 8 MILLIGRAM(S): 8 TABLET, FILM COATED ORAL at 09:03

## 2020-03-14 RX ADMIN — PANTOPRAZOLE SODIUM 80 MILLIGRAM(S): 20 TABLET, DELAYED RELEASE ORAL at 09:20

## 2020-03-14 RX ADMIN — Medication 2 MILLIGRAM(S): at 14:14

## 2020-03-14 RX ADMIN — Medication 975 MILLIGRAM(S): at 13:24

## 2020-03-14 RX ADMIN — Medication 1 MILLIGRAM(S): at 09:05

## 2020-03-14 RX ADMIN — Medication 2 MILLIGRAM(S): at 09:55

## 2020-03-14 RX ADMIN — Medication 2 MILLIGRAM(S): at 09:03

## 2020-03-14 RX ADMIN — PANTOPRAZOLE SODIUM 10 MG/HR: 20 TABLET, DELAYED RELEASE ORAL at 09:45

## 2020-03-14 RX ADMIN — SODIUM CHLORIDE 1333.33 MILLILITER(S): 9 INJECTION INTRAMUSCULAR; INTRAVENOUS; SUBCUTANEOUS at 09:05

## 2020-03-14 RX ADMIN — PANTOPRAZOLE SODIUM 40 MILLIGRAM(S): 20 TABLET, DELAYED RELEASE ORAL at 18:18

## 2020-03-14 RX ADMIN — MIDAZOLAM HYDROCHLORIDE 1 MILLIGRAM(S): 1 INJECTION, SOLUTION INTRAMUSCULAR; INTRAVENOUS at 09:12

## 2020-03-14 RX ADMIN — Medication 4 MILLIGRAM(S): at 18:20

## 2020-03-14 RX ADMIN — Medication 100 MILLIGRAM(S): at 18:18

## 2020-03-14 RX ADMIN — Medication 1 MILLIGRAM(S): at 18:18

## 2020-03-14 RX ADMIN — Medication 2 MILLIGRAM(S): at 21:00

## 2020-03-14 RX ADMIN — Medication 4 MILLIGRAM(S): at 22:07

## 2020-03-14 RX ADMIN — Medication 100 MILLIGRAM(S): at 09:05

## 2020-03-14 RX ADMIN — ONDANSETRON 4 MILLIGRAM(S): 8 TABLET, FILM COATED ORAL at 18:19

## 2020-03-14 NOTE — H&P ADULT - NSICDXFAMILYHX_GEN_ALL_CORE_FT
FAMILY HISTORY:  Family history of diabetes mellitus (DM)    Sibling  Still living? Unknown  Family history of anxiety disorder, Age at diagnosis: Age Unknown

## 2020-03-14 NOTE — H&P ADULT - NSHPSOCIALHISTORY_GEN_ALL_CORE
FAMILY HX  +diabetes mellitus (DM): mother  +anxiety: father      SOCIAL HX  no smoking/drugs, chronic etoh misuse

## 2020-03-14 NOTE — H&P ADULT - NSHPPHYSICALEXAM_GEN_ALL_CORE
T(C): 37.2 (03-14-20 @ 12:46), Max: 37.2 (03-14-20 @ 12:46)  HR: 98 (03-14-20 @ 13:06) (98 - 100)  BP: 121/74 (03-14-20 @ 13:06) (119/81 - 158/99)  RR: 20 (03-14-20 @ 13:06) (18 - 20)  SpO2: 100% (03-14-20 @ 13:06) (100% - 100%)    GEN - appears age appropriate. well nourished. pleasant. no distress.   HEENT - NCAT, EOMI, DEDE, no JVD/bruit.  RESP - CTA BL, no wheeze/stridor/rhonchi/crackles. not on supplemental O2.  CARDIO - NS1S2, RRR. No murmurs/rubs/gallops.  ABD - Soft/Non tender/Non distended. Normal BS x4 quadrants.   Ext - No GENNY. no signs of venous/arterial stasis ulcers  MSK - full ROM of BL upper and lower extremities without pain or restriction. BL 5/5 strength on upper and lower extremities.   Neuro - cn 2-12 grossly intact. cerebellar function intact. no visible seizure activity appreciated. no tremor. gait not observed.   Skin - clean, dry, intact. no rashes or lesions.    Psych- AAOx3. no suicidal/homicidal ideation. appropriate behaviour. attentive. normal affect.

## 2020-03-14 NOTE — ED ADULT TRIAGE NOTE - CHIEF COMPLAINT QUOTE
pt states "Im trying to detox myself from alcohol and klonopin, about 6 hours ago I had some type of seizure and I was vomiting." last alcohol intake was 1am, last klonopin was at midnight. denies SI/HI

## 2020-03-14 NOTE — ED ADULT NURSE NOTE - OBJECTIVE STATEMENT
pt c/o detox, last drink at midnight, per pt she drink a least a liter vodka per day,  c/o nausea and vomiting at home. headache
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
## 2020-03-14 NOTE — CONSULT NOTE ADULT - ATTENDING COMMENTS
vte AMBULATE  MED REC      FOLLOW UP  EKG  PSYCH vte AMBULATE  MED REC  psych consult called at 15:20    FOLLOW UP  EKG  PSYCH
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
## 2020-03-14 NOTE — ED PROVIDER NOTE - OBJECTIVE STATEMENT
Pertinent PMH/PSH/FHx/SHx and Review of Systems contained within:  Patient presents to the ED for EtOH and klonipin withdrawal.  also vomited once with scant blood in vomitus.  PMH of seizure DO from TBI affecting left frontal lobe in 2012 requiring surgery.  VSS but mild tachy.  states slept in bed but awoke on floor overnight.  no pain complaints.  Has had withdrawal seizures in the past.  not on any seizure meds.  Drinks 1-1.5 liters of vodka daily and takes 1-2 klonipin of unknonw dose daily.  Has attempted to detox 2 times in the past requring inpatient admisison.  no other trauma.  no other concerns.  CIWA is 7, largerly for tremor which is present at rest.  no tongue biting/loss of bladder/bowel function.  no other complaints.  Non toxic.  Well appearing. No aggravating or relieving factors. No other pertinent PMH.  No other pertinent PSH.  No other pertinent FHx.  Patient denies illicit substance use. No fever/chills, No photophobia/eye pain/changes in vision, No ear pain/sore throat/dysphagia, No chest pain/palpitations, no SOB/cough/wheeze/stridor, No diarrhea, no dysuria/frequency/discharge, No neck/back pain, no rash, no changes in neurological status/function. Pertinent PMH/PSH/FHx/SHx and Review of Systems contained within:  Patient presents to the ED for EtOH and klonipin withdrawal.  also vomited once with scant blood in vomitus.  PMH of seizure DO from TBI affecting left frontal lobe in 2012 requiring surgery.  VSS but mild tachy.  states slept in bed but awoke on floor overnight.  no pain complaints.  Has had withdrawal seizures in the past.  not on any seizure meds.  Drinks 1-1.5 liters of vodka daily and takes 1-2 klonipin of unknonw dose daily.  Has attempted to detox 2 times in the past requring inpatient admisison.  no other trauma.  no other concerns.  CIWA is 7, largerly for tremor which is present at rest (patient states took a klonipin and "drank a swig of vodka prior to arrival).  no tongue biting/loss of bladder/bowel function.  no other complaints.  Non toxic.  Well appearing. No aggravating or relieving factors. No other pertinent PMH.  No other pertinent PSH.  No other pertinent FHx.  Patient denies illicit substance use. No fever/chills, No photophobia/eye pain/changes in vision, No ear pain/sore throat/dysphagia, No chest pain/palpitations, no SOB/cough/wheeze/stridor, No diarrhea, no dysuria/frequency/discharge, No neck/back pain, no rash, no changes in neurological status/function.
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
## 2020-03-14 NOTE — ED PROVIDER NOTE - CLINICAL SUMMARY MEDICAL DECISION MAKING FREE TEXT BOX
Vilma presents with withdrawal seizure likely from acute abstinence of EtOH and BZD.  Lab values do not require emergent intervention. CT scan demonstrates no acute pathology. d/w Dr. Hampton and will admit to Dr. Howard.  ISAACS.  DAVID 7-10 in ED with treatment.

## 2020-03-14 NOTE — H&P ADULT - NSICDXPASTMEDICALHX_GEN_ALL_CORE_FT
PAST MEDICAL HISTORY:  Anxiety     ETOH abuse     Hypertension     PTSD (post-traumatic stress disorder)     Subdural hematoma

## 2020-03-14 NOTE — ED PROVIDER NOTE - CARE PLAN
Principal Discharge DX:	Alcohol withdrawal  Secondary Diagnosis:	Benzodiazepine withdrawal  Secondary Diagnosis:	Seizure

## 2020-03-14 NOTE — H&P ADULT - NSHPLABSRESULTS_GEN_ALL_CORE
03-14    137  |  97<L>  |  10.0  ----------------------------<  110<H>  4.7   |  25.0  |  0.50    Ca    10.0      14 Mar 2020 09:23  Phos  2.8     03-14  Mg     2.2     03-14    TPro  8.4  /  Alb  5.1  /  TBili  0.5  /  DBili  x   /  AST  85<H>  /  ALT  65<H>  /  AlkPhos  164<H>  03-14                          15.4   6.35  )-----------( 293      ( 14 Mar 2020 09:23 )             46.1     LIVER FUNCTIONS - ( 14 Mar 2020 09:23 )  Alb: 5.1 g/dL / Pro: 8.4 g/dL / ALK PHOS: 164 U/L / ALT: 65 U/L / AST: 85 U/L / GGT: x           PT/INR - ( 14 Mar 2020 09:23 )   PT: 10.1 sec;   INR: 0.90 ratio         PTT - ( 14 Mar 2020 09:23 )  PTT:30.5 sec

## 2020-03-15 ENCOUNTER — TRANSCRIPTION ENCOUNTER (OUTPATIENT)
Age: 55
End: 2020-03-15

## 2020-03-15 ENCOUNTER — INPATIENT (INPATIENT)
Facility: HOSPITAL | Age: 55
LOS: 4 days | Discharge: HOME | DRG: 897 | End: 2020-03-20
Attending: HOSPITALIST | Admitting: INTERNAL MEDICINE
Payer: MEDICAID

## 2020-03-15 VITALS
OXYGEN SATURATION: 98 % | RESPIRATION RATE: 18 BRPM | TEMPERATURE: 98 F | DIASTOLIC BLOOD PRESSURE: 90 MMHG | SYSTOLIC BLOOD PRESSURE: 135 MMHG | HEIGHT: 62 IN | WEIGHT: 160.06 LBS | HEART RATE: 98 BPM

## 2020-03-15 VITALS
RESPIRATION RATE: 18 BRPM | TEMPERATURE: 98 F | SYSTOLIC BLOOD PRESSURE: 160 MMHG | HEART RATE: 99 BPM | OXYGEN SATURATION: 99 % | DIASTOLIC BLOOD PRESSURE: 106 MMHG

## 2020-03-15 DIAGNOSIS — F10.231 ALCOHOL DEPENDENCE WITH WITHDRAWAL DELIRIUM: ICD-10-CM

## 2020-03-15 DIAGNOSIS — Z98.890 OTHER SPECIFIED POSTPROCEDURAL STATES: Chronic | ICD-10-CM

## 2020-03-15 DIAGNOSIS — Z90.89 ACQUIRED ABSENCE OF OTHER ORGANS: Chronic | ICD-10-CM

## 2020-03-15 DIAGNOSIS — Z98.891 HISTORY OF UTERINE SCAR FROM PREVIOUS SURGERY: Chronic | ICD-10-CM

## 2020-03-15 LAB
ALBUMIN SERPL ELPH-MCNC: 4.8 G/DL — SIGNIFICANT CHANGE UP (ref 3.3–5.2)
ALP SERPL-CCNC: 136 U/L — HIGH (ref 40–120)
ALT FLD-CCNC: 61 U/L — HIGH
ANION GAP SERPL CALC-SCNC: 14 MMOL/L — SIGNIFICANT CHANGE UP (ref 5–17)
APAP SERPL-MCNC: <7.5 UG/ML — LOW (ref 10–26)
APTT BLD: 28.5 SEC — SIGNIFICANT CHANGE UP (ref 27.5–36.3)
AST SERPL-CCNC: 74 U/L — HIGH
BASOPHILS # BLD AUTO: 0.02 K/UL — SIGNIFICANT CHANGE UP (ref 0–0.2)
BASOPHILS NFR BLD AUTO: 0.5 % — SIGNIFICANT CHANGE UP (ref 0–2)
BILIRUB SERPL-MCNC: 0.6 MG/DL — SIGNIFICANT CHANGE UP (ref 0.4–2)
BUN SERPL-MCNC: 6 MG/DL — LOW (ref 8–20)
CALCIUM SERPL-MCNC: 9.7 MG/DL — SIGNIFICANT CHANGE UP (ref 8.6–10.2)
CHLORIDE SERPL-SCNC: 99 MMOL/L — SIGNIFICANT CHANGE UP (ref 98–107)
CO2 SERPL-SCNC: 24 MMOL/L — SIGNIFICANT CHANGE UP (ref 22–29)
CREAT SERPL-MCNC: 0.53 MG/DL — SIGNIFICANT CHANGE UP (ref 0.5–1.3)
EOSINOPHIL # BLD AUTO: 0.05 K/UL — SIGNIFICANT CHANGE UP (ref 0–0.5)
EOSINOPHIL NFR BLD AUTO: 1.1 % — SIGNIFICANT CHANGE UP (ref 0–6)
ETHANOL SERPL-MCNC: <10 MG/DL — SIGNIFICANT CHANGE UP
GLUCOSE SERPL-MCNC: 97 MG/DL — SIGNIFICANT CHANGE UP (ref 70–99)
HCG SERPL-ACNC: <4 MIU/ML — SIGNIFICANT CHANGE UP
HCT VFR BLD CALC: 41.3 % — SIGNIFICANT CHANGE UP (ref 34.5–45)
HGB BLD-MCNC: 13.8 G/DL — SIGNIFICANT CHANGE UP (ref 11.5–15.5)
IMM GRANULOCYTES NFR BLD AUTO: 0.2 % — SIGNIFICANT CHANGE UP (ref 0–1.5)
INR BLD: 0.94 RATIO — SIGNIFICANT CHANGE UP (ref 0.88–1.16)
LACTATE BLDV-MCNC: 0.8 MMOL/L — SIGNIFICANT CHANGE UP (ref 0.5–2)
LIDOCAIN IGE QN: 53 U/L — HIGH (ref 22–51)
LYMPHOCYTES # BLD AUTO: 0.71 K/UL — LOW (ref 1–3.3)
LYMPHOCYTES # BLD AUTO: 16 % — SIGNIFICANT CHANGE UP (ref 13–44)
MCHC RBC-ENTMCNC: 33.3 PG — SIGNIFICANT CHANGE UP (ref 27–34)
MCHC RBC-ENTMCNC: 33.4 GM/DL — SIGNIFICANT CHANGE UP (ref 32–36)
MCV RBC AUTO: 99.8 FL — SIGNIFICANT CHANGE UP (ref 80–100)
MONOCYTES # BLD AUTO: 0.4 K/UL — SIGNIFICANT CHANGE UP (ref 0–0.9)
MONOCYTES NFR BLD AUTO: 9 % — SIGNIFICANT CHANGE UP (ref 2–14)
NEUTROPHILS # BLD AUTO: 3.24 K/UL — SIGNIFICANT CHANGE UP (ref 1.8–7.4)
NEUTROPHILS NFR BLD AUTO: 73.2 % — SIGNIFICANT CHANGE UP (ref 43–77)
PLATELET # BLD AUTO: 239 K/UL — SIGNIFICANT CHANGE UP (ref 150–400)
POTASSIUM SERPL-MCNC: 4.3 MMOL/L — SIGNIFICANT CHANGE UP (ref 3.5–5.3)
POTASSIUM SERPL-SCNC: 4.3 MMOL/L — SIGNIFICANT CHANGE UP (ref 3.5–5.3)
PROLACTIN SERPL-MCNC: 3.1 NG/ML — LOW (ref 3.4–24.1)
PROT SERPL-MCNC: 7.7 G/DL — SIGNIFICANT CHANGE UP (ref 6.6–8.7)
PROTHROM AB SERPL-ACNC: 10.6 SEC — SIGNIFICANT CHANGE UP (ref 10–12.9)
RBC # BLD: 4.14 M/UL — SIGNIFICANT CHANGE UP (ref 3.8–5.2)
RBC # FLD: 12.5 % — SIGNIFICANT CHANGE UP (ref 10.3–14.5)
SALICYLATES SERPL-MCNC: <0.6 MG/DL — LOW (ref 10–20)
SODIUM SERPL-SCNC: 137 MMOL/L — SIGNIFICANT CHANGE UP (ref 135–145)
WBC # BLD: 4.43 K/UL — SIGNIFICANT CHANGE UP (ref 3.8–10.5)
WBC # FLD AUTO: 4.43 K/UL — SIGNIFICANT CHANGE UP (ref 3.8–10.5)

## 2020-03-15 PROCEDURE — 93005 ELECTROCARDIOGRAM TRACING: CPT

## 2020-03-15 PROCEDURE — 85027 COMPLETE CBC AUTOMATED: CPT

## 2020-03-15 PROCEDURE — 85730 THROMBOPLASTIN TIME PARTIAL: CPT

## 2020-03-15 PROCEDURE — 83735 ASSAY OF MAGNESIUM: CPT

## 2020-03-15 PROCEDURE — 80307 DRUG TEST PRSMV CHEM ANLYZR: CPT

## 2020-03-15 PROCEDURE — 93010 ELECTROCARDIOGRAM REPORT: CPT | Mod: 76

## 2020-03-15 PROCEDURE — 85610 PROTHROMBIN TIME: CPT

## 2020-03-15 PROCEDURE — 36415 COLL VENOUS BLD VENIPUNCTURE: CPT

## 2020-03-15 PROCEDURE — 73130 X-RAY EXAM OF HAND: CPT | Mod: 26,LT

## 2020-03-15 PROCEDURE — 96374 THER/PROPH/DIAG INJ IV PUSH: CPT | Mod: XU

## 2020-03-15 PROCEDURE — 84100 ASSAY OF PHOSPHORUS: CPT

## 2020-03-15 PROCEDURE — 93010 ELECTROCARDIOGRAM REPORT: CPT

## 2020-03-15 PROCEDURE — 74174 CTA ABD&PLVS W/CONTRAST: CPT

## 2020-03-15 PROCEDURE — 99285 EMERGENCY DEPT VISIT HI MDM: CPT | Mod: 25

## 2020-03-15 PROCEDURE — 83690 ASSAY OF LIPASE: CPT

## 2020-03-15 PROCEDURE — 70450 CT HEAD/BRAIN W/O DYE: CPT

## 2020-03-15 PROCEDURE — 80053 COMPREHEN METABOLIC PANEL: CPT

## 2020-03-15 PROCEDURE — 84146 ASSAY OF PROLACTIN: CPT

## 2020-03-15 PROCEDURE — 99291 CRITICAL CARE FIRST HOUR: CPT

## 2020-03-15 PROCEDURE — 84702 CHORIONIC GONADOTROPIN TEST: CPT

## 2020-03-15 PROCEDURE — 83605 ASSAY OF LACTIC ACID: CPT

## 2020-03-15 PROCEDURE — 96376 TX/PRO/DX INJ SAME DRUG ADON: CPT | Mod: XU

## 2020-03-15 PROCEDURE — 70450 CT HEAD/BRAIN W/O DYE: CPT | Mod: 26

## 2020-03-15 PROCEDURE — 96375 TX/PRO/DX INJ NEW DRUG ADDON: CPT | Mod: XU

## 2020-03-15 PROCEDURE — 99239 HOSP IP/OBS DSCHRG MGMT >30: CPT

## 2020-03-15 RX ORDER — PHENOBARBITAL 60 MG
130 TABLET ORAL ONCE
Refills: 0 | Status: DISCONTINUED | OUTPATIENT
Start: 2020-03-15 | End: 2020-03-15

## 2020-03-15 RX ORDER — MIDAZOLAM HYDROCHLORIDE 1 MG/ML
2 INJECTION, SOLUTION INTRAMUSCULAR; INTRAVENOUS ONCE
Refills: 0 | Status: DISCONTINUED | OUTPATIENT
Start: 2020-03-15 | End: 2020-03-15

## 2020-03-15 RX ORDER — PHENOBARBITAL 60 MG
130 TABLET ORAL EVERY 6 HOURS
Refills: 0 | Status: DISCONTINUED | OUTPATIENT
Start: 2020-03-15 | End: 2020-03-16

## 2020-03-15 RX ORDER — ENOXAPARIN SODIUM 100 MG/ML
40 INJECTION SUBCUTANEOUS DAILY
Refills: 0 | Status: DISCONTINUED | OUTPATIENT
Start: 2020-03-15 | End: 2020-03-20

## 2020-03-15 RX ORDER — THIAMINE MONONITRATE (VIT B1) 100 MG
100 TABLET ORAL DAILY
Refills: 0 | Status: DISCONTINUED | OUTPATIENT
Start: 2020-03-15 | End: 2020-03-16

## 2020-03-15 RX ORDER — PHENOBARBITAL 60 MG
130 TABLET ORAL EVERY 4 HOURS
Refills: 0 | Status: DISCONTINUED | OUTPATIENT
Start: 2020-03-15 | End: 2020-03-16

## 2020-03-15 RX ORDER — FOLIC ACID 0.8 MG
1 TABLET ORAL DAILY
Refills: 0 | Status: DISCONTINUED | OUTPATIENT
Start: 2020-03-15 | End: 2020-03-20

## 2020-03-15 RX ADMIN — Medication 2 MILLIGRAM(S): at 09:51

## 2020-03-15 RX ADMIN — MIDAZOLAM HYDROCHLORIDE 2 MILLIGRAM(S): 1 INJECTION, SOLUTION INTRAMUSCULAR; INTRAVENOUS at 15:45

## 2020-03-15 RX ADMIN — Medication 2 MILLIGRAM(S): at 10:53

## 2020-03-15 RX ADMIN — Medication 130 MILLIGRAM(S): at 22:31

## 2020-03-15 RX ADMIN — Medication 2 MILLIGRAM(S): at 15:28

## 2020-03-15 RX ADMIN — Medication 2 MILLIGRAM(S): at 14:07

## 2020-03-15 RX ADMIN — Medication 2 MILLIGRAM(S): at 04:29

## 2020-03-15 RX ADMIN — MIDAZOLAM HYDROCHLORIDE 2 MILLIGRAM(S): 1 INJECTION, SOLUTION INTRAMUSCULAR; INTRAVENOUS at 14:59

## 2020-03-15 RX ADMIN — ONDANSETRON 8 MILLIGRAM(S): 8 TABLET, FILM COATED ORAL at 08:09

## 2020-03-15 RX ADMIN — PANTOPRAZOLE SODIUM 40 MILLIGRAM(S): 20 TABLET, DELAYED RELEASE ORAL at 08:09

## 2020-03-15 RX ADMIN — Medication 4 MILLIGRAM(S): at 08:13

## 2020-03-15 RX ADMIN — Medication 4 MILLIGRAM(S): at 03:04

## 2020-03-15 RX ADMIN — Medication 130 MILLIGRAM(S): at 18:00

## 2020-03-15 RX ADMIN — MIDAZOLAM HYDROCHLORIDE 2 MILLIGRAM(S): 1 INJECTION, SOLUTION INTRAMUSCULAR; INTRAVENOUS at 14:07

## 2020-03-15 RX ADMIN — Medication 130 MILLIGRAM(S): at 16:22

## 2020-03-15 RX ADMIN — MIDAZOLAM HYDROCHLORIDE 2 MILLIGRAM(S): 1 INJECTION, SOLUTION INTRAMUSCULAR; INTRAVENOUS at 15:28

## 2020-03-15 RX ADMIN — Medication 2 MILLIGRAM(S): at 14:59

## 2020-03-15 NOTE — ED ADULT NURSE REASSESSMENT NOTE - NS ED NURSE REASSESS COMMENT FT1
MD. aware that pt. is still getting out of bed. additional medications given as documented. 1:1 at bedside.

## 2020-03-15 NOTE — CHART NOTE - NSCHARTNOTEFT_GEN_A_CORE
17:35- Patient being transferred onto portable monitor when she became agitated and attempted to strike staff and elope from the ED.  Patient still with delusions and hallucinations and speaking to people that were not present.  Additional 130mg phenobarb given just prior to transport.  VSS.  patient monitored during transport.  otherwise Non toxic.  Well appearing.

## 2020-03-15 NOTE — DISCHARGE NOTE PROVIDER - NSDCMRMEDTOKEN_GEN_ALL_CORE_FT
KlonoPIN 1 mg oral tablet: 1 tab(s) orally 3 times a day, As Needed  Nyquil Cold Medicine oral liquid:

## 2020-03-15 NOTE — H&P ADULT - NSHPREVIEWOFSYSTEMS_GEN_ALL_CORE
CONSTITUTIONAL: No fever, chills, or fatigue  EYES: No eye pain, visual disturbances, or discharge  ENMT:  No difficulty hearing, tinnitus, vertigo; No sinus or throat pain  NECK: No pain or stiffness  RESPIRATORY: No cough, wheezing, chills or hemoptysis; No shortness of breath  CARDIOVASCULAR: No chest pain, palpitations, dizziness, or leg swelling  GASTROINTESTINAL: No abdominal or epigastric pain. No nausea, vomiting, or hematemesis; No diarrhea or constipation. No melena or hematochezia.  GENITOURINARY: No dysuria, frequency, hematuria, or incontinence  NEUROLOGICAL: No headaches, memory loss, loss of strength, numbness, or tremors  SKIN: No itching, burning, rashes, or lesions   MUSCULOSKELETAL: No joint pain or swelling; No muscle, back, or extremity pain  PSYCHIATRIC: No depression,  mood swings, or difficulty sleeping. +ve anxiety and hallucinations

## 2020-03-15 NOTE — H&P ADULT - NSHPPHYSICALEXAM_GEN_ALL_CORE
Neuro: AAO*3, No motor, sensory, or cranial nerve deficit    HEENT: Pupils equal, reactive to light, Moist oral mucosa    PULM: Clear to auscultation bilaterally, no significant adventitious breath sounds     CVS: Regular rhythm and controlled rate, no murmurs, rubs, or gallops    ABD: Soft, nondistended, nontender, normoactive bowel sounds    EXT: No b/l LE edema, nontender with pedal pulse palpable     SKIN: Warm and well perfused, no acute rashes
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
## 2020-03-15 NOTE — ED ADULT TRIAGE NOTE - CHIEF COMPLAINT QUOTE
Patient arrived to ED today after being admitted for alcohol detox and Klonopin.  Patient c/o back pains.  Patient is altered.
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
## 2020-03-15 NOTE — DISCHARGE NOTE PROVIDER - NSDCCPCAREPLAN_GEN_ALL_CORE_FT
PRINCIPAL DISCHARGE DIAGNOSIS  Diagnosis: Alcohol withdrawal  Assessment and Plan of Treatment: Be sure to abstain completely from alcohol. Although it is a legal substance, its risk for addiction and complications can be life altering, dangerous, irreversible, and unfortunately even deadly. We recommend that you participate in either or both inpatient and outpatient rehab programs in order to start or continue your journey to sobriety.      SECONDARY DISCHARGE DIAGNOSES  Diagnosis: Seizure  Assessment and Plan of Treatment:     Diagnosis: Benzodiazepine withdrawal  Assessment and Plan of Treatment:
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
## 2020-03-15 NOTE — H&P ADULT - HISTORY OF PRESENT ILLNESS
54F w/ PMHx EtOH abuse, TBI and ?SAH/SDH in the past presented for evaluation w/ altered mental status. She drinks approximately 1-1.5L vodka per day and her last drink was supposedly last night. She is also on Klonopin but does not remember taking it for months. Denies any substance abuse. No recent falls but was c/o L hand pain to the ED physician earlier  Pt was actively hallucinating and c/o feeling ants on her skin and occasional visual hallucinations. She was given ~10mg Versed, ~ 8mg Ativan and 130mg x 2 phenobarb in the ED.   Of note she has present yesterday w/ a withdrawal seizure after waking up on the floor and had left the hospital AMA this morning 54F w/ PMHx EtOH abuse, TBI and ?SAH/SDH presented w/ altered mental status. She drinks approximately 1-1.5L vodka per day and her last drink was supposedly last night. She is also on Klonopin but does not remember taking it for months. Denies any other substance abuse. No recent falls but was c/o L hand pain to the ED physician earlier  Pt was actively hallucinating and c/o feeling ants on her skin and occasional visual hallucinations. She was given ~10mg Versed, ~ 8mg Ativan and 130mg x 2 phenobarb in the ED.   Of note she has present yesterday w/ a withdrawal seizure after waking up on the floor and had left the hospital AMA this morning. CTH was normal

## 2020-03-15 NOTE — ED ADULT NURSE REASSESSMENT NOTE - NS ED NURSE REASSESS COMMENT FT1
pt. is trying to get out of bed, still very confused. additional meds being ordered as per MD. reassess in 10 minutes.

## 2020-03-15 NOTE — ED PROVIDER NOTE - PHYSICAL EXAMINATION
Gen: Alert, NAD  Head: NC, AT, PERRL, EOMI, normal lids/conjunctiva  ENT: normal hearing, patent oropharynx without erythema/exudate, uvula midline  Neck: +supple, no tenderness/meningismus/JVD, +Trachea midline  Pulm: Bilateral BS, normal resp effort, no wheeze/stridor/retractions  CV: RRR, no M/R/G, +dist pulses  Abd: soft, NT/ND, +BS, no hepatosplenomegaly  Mskel: no edema/erythema/cyanosis  Skin: no rash  Neuro: AAOx3, no gross sensory/motor deficits

## 2020-03-15 NOTE — ED PROVIDER NOTE - CLINICAL SUMMARY MEDICAL DECISION MAKING FREE TEXT BOX
Patient presents with EtOH withdrawal.  Patient with persistent DT refractory to multiple doses of indicated medication.  Xrays demonstrate no acute pathology. CT scan demonstrates no acute pathology. ICU consulted and they will accept.
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
## 2020-03-15 NOTE — ED PROVIDER NOTE - PROGRESS NOTE DETAILS
Patient rounded on multiple times during ED stay with obvious signs of DT.  mutliple doses of BZD given.  ICU consulted as patient still with persistent hallucinations and mild agitation.  will continue to monitor and provide sx based treatment as condition warrants

## 2020-03-15 NOTE — H&P ADULT - ASSESSMENT
EtOH withdrawal  Withdrawal seizures  Hallucinations  TBI w/ h/o ?SDH/ SAH    Neuro: Started on CIWA protocol. 1:1 observations. CTH grossly normal. Start Phenobarb 130mg q6h along w/ 130mg q2 PRN along w/ thiamine, FA and multivitamis  Cardiovascular: Aim for MAP target 65  Resp/Chest: Maintain SpO2 > 92%  GI/Nutrition: Keep NPO for now  ID: No active issues  Renal: Avoid nephrotoxic agents. Strict I&O  Endocrinology: Maintain Blood sugar < 180. ISS as per protocol  Haem/Oncology:  DVT ppx w/ Lovenox       Critical Care time: 35 minutes assessing presenting problems of acute illness that poses high probability of life threatening deterioration or end organ damage/dysfunction.  Medical decision making including Initiating plan of care, reviewing data, reviewing radiology, discussing with multidisciplinary team, non inclusive of procedures

## 2020-03-16 LAB
ALBUMIN SERPL ELPH-MCNC: 4.4 G/DL — SIGNIFICANT CHANGE UP (ref 3.3–5.2)
ALBUMIN SERPL ELPH-MCNC: 4.5 G/DL — SIGNIFICANT CHANGE UP (ref 3.3–5.2)
ALP SERPL-CCNC: 136 U/L — HIGH (ref 40–120)
ALP SERPL-CCNC: 139 U/L — HIGH (ref 40–120)
ALT FLD-CCNC: 60 U/L — HIGH
ALT FLD-CCNC: 62 U/L — HIGH
ANION GAP SERPL CALC-SCNC: 12 MMOL/L — SIGNIFICANT CHANGE UP (ref 5–17)
AST SERPL-CCNC: 72 U/L — HIGH
AST SERPL-CCNC: 74 U/L — HIGH
BILIRUB DIRECT SERPL-MCNC: 0.1 MG/DL — SIGNIFICANT CHANGE UP (ref 0–0.3)
BILIRUB INDIRECT FLD-MCNC: 0.5 MG/DL — SIGNIFICANT CHANGE UP (ref 0.2–1)
BILIRUB SERPL-MCNC: 0.5 MG/DL — SIGNIFICANT CHANGE UP (ref 0.4–2)
BILIRUB SERPL-MCNC: 0.6 MG/DL — SIGNIFICANT CHANGE UP (ref 0.4–2)
BUN SERPL-MCNC: 7 MG/DL — LOW (ref 8–20)
CALCIUM SERPL-MCNC: 9.7 MG/DL — SIGNIFICANT CHANGE UP (ref 8.6–10.2)
CHLORIDE SERPL-SCNC: 102 MMOL/L — SIGNIFICANT CHANGE UP (ref 98–107)
CO2 SERPL-SCNC: 28 MMOL/L — SIGNIFICANT CHANGE UP (ref 22–29)
CREAT SERPL-MCNC: 0.55 MG/DL — SIGNIFICANT CHANGE UP (ref 0.5–1.3)
GLUCOSE SERPL-MCNC: 86 MG/DL — SIGNIFICANT CHANGE UP (ref 70–99)
HCT VFR BLD CALC: 42.1 % — SIGNIFICANT CHANGE UP (ref 34.5–45)
HGB BLD-MCNC: 14.1 G/DL — SIGNIFICANT CHANGE UP (ref 11.5–15.5)
MAGNESIUM SERPL-MCNC: 2.1 MG/DL — SIGNIFICANT CHANGE UP (ref 1.6–2.6)
MCHC RBC-ENTMCNC: 33.5 GM/DL — SIGNIFICANT CHANGE UP (ref 32–36)
MCHC RBC-ENTMCNC: 33.5 PG — SIGNIFICANT CHANGE UP (ref 27–34)
MCV RBC AUTO: 100 FL — SIGNIFICANT CHANGE UP (ref 80–100)
PHOSPHATE SERPL-MCNC: 3.7 MG/DL — SIGNIFICANT CHANGE UP (ref 2.4–4.7)
PLATELET # BLD AUTO: 208 K/UL — SIGNIFICANT CHANGE UP (ref 150–400)
POTASSIUM SERPL-MCNC: 4 MMOL/L — SIGNIFICANT CHANGE UP (ref 3.5–5.3)
POTASSIUM SERPL-SCNC: 4 MMOL/L — SIGNIFICANT CHANGE UP (ref 3.5–5.3)
PROT SERPL-MCNC: 6.9 G/DL — SIGNIFICANT CHANGE UP (ref 6.6–8.7)
PROT SERPL-MCNC: 7 G/DL — SIGNIFICANT CHANGE UP (ref 6.6–8.7)
RBC # BLD: 4.21 M/UL — SIGNIFICANT CHANGE UP (ref 3.8–5.2)
RBC # FLD: 12.5 % — SIGNIFICANT CHANGE UP (ref 10.3–14.5)
SODIUM SERPL-SCNC: 142 MMOL/L — SIGNIFICANT CHANGE UP (ref 135–145)
WBC # BLD: 3.82 K/UL — SIGNIFICANT CHANGE UP (ref 3.8–10.5)
WBC # FLD AUTO: 3.82 K/UL — SIGNIFICANT CHANGE UP (ref 3.8–10.5)

## 2020-03-16 PROCEDURE — 99233 SBSQ HOSP IP/OBS HIGH 50: CPT

## 2020-03-16 RX ORDER — THIAMINE MONONITRATE (VIT B1) 100 MG
100 TABLET ORAL DAILY
Refills: 0 | Status: DISCONTINUED | OUTPATIENT
Start: 2020-03-16 | End: 2020-03-20

## 2020-03-16 RX ADMIN — ENOXAPARIN SODIUM 40 MILLIGRAM(S): 100 INJECTION SUBCUTANEOUS at 12:03

## 2020-03-16 RX ADMIN — Medication 130 MILLIGRAM(S): at 06:25

## 2020-03-16 RX ADMIN — Medication 50 MILLIGRAM(S): at 23:52

## 2020-03-16 RX ADMIN — Medication 1 MILLIGRAM(S): at 12:04

## 2020-03-16 RX ADMIN — Medication 50 MILLIGRAM(S): at 12:03

## 2020-03-16 RX ADMIN — Medication 50 MILLIGRAM(S): at 08:41

## 2020-03-16 RX ADMIN — Medication 100 MILLIGRAM(S): at 13:18

## 2020-03-16 RX ADMIN — Medication 1 TABLET(S): at 12:03

## 2020-03-16 RX ADMIN — Medication 50 MILLIGRAM(S): at 17:37

## 2020-03-16 NOTE — PROGRESS NOTE ADULT - SUBJECTIVE AND OBJECTIVE BOX
SUDHEER DELEON    [id number]    54y      Female    Patient is a 54y old  Female who presents with a chief complaint of EtOh withdrawal (15 Mar 2020 17:40)      INTERVAL HPI/OVERNIGHT EVENTS:    patient is little bit drowsy, able to keep up with conversation, she denies any chest pain, sob, dizziness.     REVIEW OF SYSTEMS:    CONSTITUTIONAL: No fever, has fatigue  RESPIRATORY: No cough, No shortness of breath  CARDIOVASCULAR: No chest pain, palpitations  GASTROINTESTINAL: No abdominal, No nausea, vomiting  NEUROLOGICAL: No headaches,  loss of strength.  MISCELLANEOUS: No joint swelling or pain       Vital Signs Last 24 Hrs  T(C): 36.9 (16 Mar 2020 12:00), Max: 36.9 (15 Mar 2020 23:39)  T(F): 98.4 (16 Mar 2020 12:00), Max: 98.5 (15 Mar 2020 23:39)  HR: 97 (16 Mar 2020 12:00) (75 - 106)  BP: 110/74 (16 Mar 2020 12:00) (100/60 - 145/92)  BP(mean): 87 (16 Mar 2020 12:00) (74 - 94)  RR: 26 (16 Mar 2020 12:00) (14 - 26)  SpO2: 99% (16 Mar 2020 12:00) (93% - 100%)    PHYSICAL EXAM:    GENERAL: Middle age female looking drowsy   HEENT: Atraumatic, no tongue bite   NECK: soft, Supple, No JVD,   CHEST/LUNG: Clear to auscultate bilaterally; No wheezing  HEART: S1S2+, Regular rate and rhythm; No murmurs  ABDOMEN: Soft, Nontender, Nondistended; Bowel sounds present  EXTREMITIES:  1+ Peripheral Pulses, No edema  SKIN: No rashes or lesions  NEURO: OX3, able to move all extremities, no tremulousness   PSYCH: normal mood      LABS:                        14.1   3.82  )-----------( 208      ( 16 Mar 2020 06:22 )             42.1     03-16    142  |  102  |  7.0<L>  ----------------------------<  86  4.0   |  28.0  |  0.55    Ca    9.7      16 Mar 2020 06:22  Phos  3.7     03-16  Mg     2.1     03-16    TPro  7.0  /  Alb  4.4  /  TBili  0.6  /  DBili  0.1  /  AST  74<H>  /  ALT  60<H>  /  AlkPhos  139<H>  03-16    PT/INR - ( 15 Mar 2020 13:47 )   PT: 10.6 sec;   INR: 0.94 ratio         PTT - ( 15 Mar 2020 13:47 )  PTT:28.5 sec        I&O's Summary    16 Mar 2020 07:01  -  16 Mar 2020 15:13  --------------------------------------------------------  IN: 275 mL / OUT: 1 mL / NET: 274 mL        MEDICATIONS  (STANDING):  chlordiazePOXIDE 50 milliGRAM(s) Oral every 6 hours  chlordiazePOXIDE   Oral   enoxaparin Injectable 40 milliGRAM(s) SubCutaneous daily  folic acid 1 milliGRAM(s) Oral daily  multivitamin 1 Tablet(s) Oral daily  thiamine 100 milliGRAM(s) Oral daily    MEDICATIONS  (PRN):

## 2020-03-16 NOTE — PROGRESS NOTE ADULT - ASSESSMENT
54 F w Hx EtOH abuse, TBI and ?SAH/SDH presented w/ altered mental status, She drinks approximately 1-1.5L vodka per day and her last drink was supposedly night before admission, She is also on Klonopin but does not remember taking it for months, Denied any other substance abuse. No recent falls but was c/o L hand pain to the ED physician earlier, she was hallucinating and c/o feeling ants on her skin and occasional visual hallucinations in the ED, She was given ~10mg Versed, ~ 8mg Ativan and 130mg x 2 phenobarb in the ED and was admitted in ICU, she was started on CIWA protocol. 1:1 observations. CTH grossly normal, Started on Phenobarb 130mg q6h along w/ 130mg q2 PRN along w/ thiamine, FA and multivitamins, patient has no more hallucination, patient is being accepted for further management under hospitalist services.     Plan:     Alcohol Withdrawal: Seizure precaution, fall precaution, will continue with Librium as per CIWA protocol, will continue with thiamine, folate and multivitamins as there is likely deficiency, as per patient she has sister who is heavy drinker, she comes to her and she start drinking,  consulted, will do enhance supervision.     Hx of TBI, SHA and SDH: Will do seizure precaution, CT head done showed No acute intracranial hemorrhage, mass effect, or acute territorial infarction. Chronic small left frontal encephalomalacia. If symptoms persist, MR imaging is recommended.    DVT prophylaxis: Lovenox sc Pt asymptomatic

## 2020-03-17 LAB
ALBUMIN SERPL ELPH-MCNC: 4.2 G/DL — SIGNIFICANT CHANGE UP (ref 3.3–5.2)
ALP SERPL-CCNC: 157 U/L — HIGH (ref 40–120)
ALT FLD-CCNC: 67 U/L — HIGH
ANION GAP SERPL CALC-SCNC: 16 MMOL/L — SIGNIFICANT CHANGE UP (ref 5–17)
AST SERPL-CCNC: 62 U/L — HIGH
BILIRUB DIRECT SERPL-MCNC: 0.1 MG/DL — SIGNIFICANT CHANGE UP (ref 0–0.3)
BILIRUB INDIRECT FLD-MCNC: 0.3 MG/DL — SIGNIFICANT CHANGE UP (ref 0.2–1)
BILIRUB SERPL-MCNC: 0.4 MG/DL — SIGNIFICANT CHANGE UP (ref 0.4–2)
BUN SERPL-MCNC: 10 MG/DL — SIGNIFICANT CHANGE UP (ref 8–20)
CALCIUM SERPL-MCNC: 9.6 MG/DL — SIGNIFICANT CHANGE UP (ref 8.6–10.2)
CHLORIDE SERPL-SCNC: 98 MMOL/L — SIGNIFICANT CHANGE UP (ref 98–107)
CO2 SERPL-SCNC: 23 MMOL/L — SIGNIFICANT CHANGE UP (ref 22–29)
CREAT SERPL-MCNC: 0.56 MG/DL — SIGNIFICANT CHANGE UP (ref 0.5–1.3)
GLUCOSE SERPL-MCNC: 87 MG/DL — SIGNIFICANT CHANGE UP (ref 70–99)
MAGNESIUM SERPL-MCNC: 2.1 MG/DL — SIGNIFICANT CHANGE UP (ref 1.6–2.6)
PHOSPHATE SERPL-MCNC: 3.5 MG/DL — SIGNIFICANT CHANGE UP (ref 2.4–4.7)
POTASSIUM SERPL-MCNC: 3.9 MMOL/L — SIGNIFICANT CHANGE UP (ref 3.5–5.3)
POTASSIUM SERPL-SCNC: 3.9 MMOL/L — SIGNIFICANT CHANGE UP (ref 3.5–5.3)
PROT SERPL-MCNC: 7.3 G/DL — SIGNIFICANT CHANGE UP (ref 6.6–8.7)
SODIUM SERPL-SCNC: 137 MMOL/L — SIGNIFICANT CHANGE UP (ref 135–145)

## 2020-03-17 PROCEDURE — 93971 EXTREMITY STUDY: CPT | Mod: 26,LT

## 2020-03-17 PROCEDURE — 99232 SBSQ HOSP IP/OBS MODERATE 35: CPT

## 2020-03-17 RX ORDER — PANTOPRAZOLE SODIUM 20 MG/1
40 TABLET, DELAYED RELEASE ORAL
Refills: 0 | Status: DISCONTINUED | OUTPATIENT
Start: 2020-03-17 | End: 2020-03-20

## 2020-03-17 RX ORDER — ASPIRIN/CALCIUM CARB/MAGNESIUM 324 MG
81 TABLET ORAL DAILY
Refills: 0 | Status: DISCONTINUED | OUTPATIENT
Start: 2020-03-17 | End: 2020-03-20

## 2020-03-17 RX ORDER — ONDANSETRON 8 MG/1
4 TABLET, FILM COATED ORAL EVERY 6 HOURS
Refills: 0 | Status: DISCONTINUED | OUTPATIENT
Start: 2020-03-17 | End: 2020-03-20

## 2020-03-17 RX ORDER — INFLUENZA VIRUS VACCINE 15; 15; 15; 15 UG/.5ML; UG/.5ML; UG/.5ML; UG/.5ML
0.5 SUSPENSION INTRAMUSCULAR ONCE
Refills: 0 | Status: DISCONTINUED | OUTPATIENT
Start: 2020-03-17 | End: 2020-03-20

## 2020-03-17 RX ADMIN — PANTOPRAZOLE SODIUM 40 MILLIGRAM(S): 20 TABLET, DELAYED RELEASE ORAL at 09:01

## 2020-03-17 RX ADMIN — Medication 1 MILLIGRAM(S): at 09:02

## 2020-03-17 RX ADMIN — Medication 50 MILLIGRAM(S): at 15:05

## 2020-03-17 RX ADMIN — Medication 1 MILLIGRAM(S): at 20:45

## 2020-03-17 RX ADMIN — Medication 100 MILLIGRAM(S): at 11:27

## 2020-03-17 RX ADMIN — Medication 50 MILLIGRAM(S): at 20:02

## 2020-03-17 RX ADMIN — Medication 0.5 MILLIGRAM(S): at 11:30

## 2020-03-17 RX ADMIN — Medication 50 MILLIGRAM(S): at 09:01

## 2020-03-17 RX ADMIN — ONDANSETRON 4 MILLIGRAM(S): 8 TABLET, FILM COATED ORAL at 20:02

## 2020-03-17 RX ADMIN — Medication 1 TABLET(S): at 09:02

## 2020-03-17 NOTE — PROGRESS NOTE ADULT - ASSESSMENT
54 F w Hx EtOH abuse, TBI and ?SAH/SDH presented w/ altered mental status, She drinks approximately 1-1.5L vodka per day and her last drink was supposedly night before admission, She is also on Klonopin but does not remember taking it for months, Denied any other substance abuse. No recent falls but was c/o L hand pain to the ED physician earlier, she was hallucinating and c/o feeling ants on her skin and occasional visual hallucinations in the ED, She was given ~10mg Versed, ~ 8mg Ativan and 130mg x 2 phenobarb in the ED and was admitted in ICU, she was started on CIWA protocol. 1:1 observations. CTH grossly normal, Started on Phenobarb 130mg q6h along w/ 130mg q2 PRN along w/ thiamine, FA and multivitamins, patient has no more hallucination, patient is being accepted for further management under hospitalist services.     Plan:     Alcohol Withdrawal: Seizure precaution, fall precaution, will continue with Librium as per CIWA protocol, will continue with thiamine, folate and multivitamins as there is likely deficiency, as per patient she has sister who is heavy drinker, she comes to her and she start drinking,  consulted, will do enhance supervision, d/c 1:1.     Hx of TBI, SHA and SDH: Will do seizure precaution, CT head done showed No acute intracranial hemorrhage, mass effect, or acute territorial infarction. Chronic small left frontal encephalomalacia. If symptoms persist, MR imaging is recommended.    Anxiety: Ativan PRN    Left forearm swelling: XR negative for fracture, will get US doppler to r/o any clot, will keep it elevated.     DVT prophylaxis: Lovenox sc

## 2020-03-17 NOTE — SBIRT NOTE ADULT - NSSBIRTALCPASSREFTXDET_GEN_A_CORE
SW met with pt at this time to discuss alcohol use and possible referral to treatment. Pt is declining inpatient referral at this time. Pt states she has a dog and she does not want to leave him. Pt has been to over 20+ rehabs in the past. Pt states she drinks from the morning she wakes up until she passes out. Worker offered outpatient programs, pt reported she is tired of 12-step programs. She wants to stay in the hospital longer and is requesting a longer taper. Worker explained if pt is medically clear, she cannot stay her for detox as we do not have a program. Pt is asking for patient experience. VINNIE called and spoke with Keely.

## 2020-03-17 NOTE — PROGRESS NOTE ADULT - SUBJECTIVE AND OBJECTIVE BOX
SUDHEER DELEON    [id number]    54y      Female    Patient is a 54y old  Female who presents with a chief complaint of EtOh withdrawal (16 Mar 2020 15:09)      INTERVAL HPI/OVERNIGHT EVENTS:  patient is anxious, she is also feeling swelling in her left hand is not going down, she denies any chest pain, sob, dizziness.     REVIEW OF SYSTEMS:    CONSTITUTIONAL: No fever, has fatigue  RESPIRATORY: No cough, No shortness of breath  CARDIOVASCULAR: No chest pain, palpitations  GASTROINTESTINAL: No abdominal, No nausea, vomiting  NEUROLOGICAL: No headaches,  loss of strength.  MISCELLANEOUS: left forearm and hand swelling      Vital Signs Last 24 Hrs  T(C): 37 (17 Mar 2020 08:13), Max: 37 (17 Mar 2020 08:13)  T(F): 98.6 (17 Mar 2020 08:13), Max: 98.6 (17 Mar 2020 08:13)  HR: 101 (17 Mar 2020 08:13) (82 - 101)  BP: 115/82 (17 Mar 2020 08:13) (109/75 - 121/64)  BP(mean): 93 (16 Mar 2020 15:00) (87 - 93)  RR: 18 (17 Mar 2020 08:13) (16 - 26)  SpO2: 99% (16 Mar 2020 20:54) (96% - 100%)    PHYSICAL EXAM:  GENERAL: Middle age female looking anxious   HEENT: Atraumatic, no tongue bite   NECK: soft, Supple, No JVD,   CHEST/LUNG: Clear to auscultate bilaterally; No wheezing  HEART: S1S2+, Regular rate and rhythm; No murmurs  ABDOMEN: Soft, Nontender, Nondistended; Bowel sounds present  EXTREMITIES:  1+ Peripheral Pulses, No edema, left forearm is swelling, no tenderness   SKIN: No rashes or lesions  NEURO: AOX3, able to move all extremities, no tremulousness   PSYCH: Anxious mood      LABS:                        14.1   3.82  )-----------( 208      ( 16 Mar 2020 06:22 )             42.1     03-17    137  |  98  |  10.0  ----------------------------<  87  3.9   |  23.0  |  0.56    Ca    9.6      17 Mar 2020 07:33  Phos  3.5     03-17  Mg     2.1     03-17    TPro  7.3  /  Alb  4.2  /  TBili  0.4  /  DBili  0.1  /  AST  62<H>  /  ALT  67<H>  /  AlkPhos  157<H>  03-17    PT/INR - ( 15 Mar 2020 13:47 )   PT: 10.6 sec;   INR: 0.94 ratio         PTT - ( 15 Mar 2020 13:47 )  PTT:28.5 sec        I&O's Summary    16 Mar 2020 07:01  -  17 Mar 2020 07:00  --------------------------------------------------------  IN: 400 mL / OUT: 2 mL / NET: 398 mL    17 Mar 2020 07:01  -  17 Mar 2020 11:05  --------------------------------------------------------  IN: 118 mL / OUT: 0 mL / NET: 118 mL        MEDICATIONS  (STANDING):  chlordiazePOXIDE 50 milliGRAM(s) Oral every 6 hours  enoxaparin Injectable 40 milliGRAM(s) SubCutaneous daily  folic acid 1 milliGRAM(s) Oral daily  LORazepam   Injectable 1 milliGRAM(s) IV Push once  multivitamin 1 Tablet(s) Oral daily  pantoprazole    Tablet 40 milliGRAM(s) Oral before breakfast  thiamine 100 milliGRAM(s) Oral daily    MEDICATIONS  (PRN):  LORazepam     Tablet 0.5 milliGRAM(s) Oral every 12 hours PRN Anxity and agitation  ondansetron    Tablet 4 milliGRAM(s) Oral every 6 hours PRN Nausea and/or Vomiting

## 2020-03-18 DIAGNOSIS — F41.0 PANIC DISORDER [EPISODIC PAROXYSMAL ANXIETY]: ICD-10-CM

## 2020-03-18 DIAGNOSIS — F10.20 ALCOHOL DEPENDENCE, UNCOMPLICATED: ICD-10-CM

## 2020-03-18 DIAGNOSIS — F10.239 ALCOHOL DEPENDENCE WITH WITHDRAWAL, UNSPECIFIED: ICD-10-CM

## 2020-03-18 LAB
ALBUMIN SERPL ELPH-MCNC: 4.2 G/DL — SIGNIFICANT CHANGE UP (ref 3.3–5.2)
ALP SERPL-CCNC: 138 U/L — HIGH (ref 40–120)
ALT FLD-CCNC: 48 U/L — HIGH
ANION GAP SERPL CALC-SCNC: 12 MMOL/L — SIGNIFICANT CHANGE UP (ref 5–17)
AST SERPL-CCNC: 34 U/L — HIGH
BILIRUB DIRECT SERPL-MCNC: 0.1 MG/DL — SIGNIFICANT CHANGE UP (ref 0–0.3)
BILIRUB INDIRECT FLD-MCNC: <0.1 MG/DL — LOW (ref 0.2–1)
BILIRUB SERPL-MCNC: <0.2 MG/DL — LOW (ref 0.4–2)
BUN SERPL-MCNC: 20 MG/DL — SIGNIFICANT CHANGE UP (ref 8–20)
CALCIUM SERPL-MCNC: 9.1 MG/DL — SIGNIFICANT CHANGE UP (ref 8.6–10.2)
CHLORIDE SERPL-SCNC: 99 MMOL/L — SIGNIFICANT CHANGE UP (ref 98–107)
CO2 SERPL-SCNC: 24 MMOL/L — SIGNIFICANT CHANGE UP (ref 22–29)
CREAT SERPL-MCNC: 0.81 MG/DL — SIGNIFICANT CHANGE UP (ref 0.5–1.3)
GLUCOSE SERPL-MCNC: 100 MG/DL — HIGH (ref 70–99)
POTASSIUM SERPL-MCNC: 3.8 MMOL/L — SIGNIFICANT CHANGE UP (ref 3.5–5.3)
POTASSIUM SERPL-SCNC: 3.8 MMOL/L — SIGNIFICANT CHANGE UP (ref 3.5–5.3)
PROT SERPL-MCNC: 6.9 G/DL — SIGNIFICANT CHANGE UP (ref 6.6–8.7)
SODIUM SERPL-SCNC: 135 MMOL/L — SIGNIFICANT CHANGE UP (ref 135–145)

## 2020-03-18 PROCEDURE — 99232 SBSQ HOSP IP/OBS MODERATE 35: CPT

## 2020-03-18 PROCEDURE — 90792 PSYCH DIAG EVAL W/MED SRVCS: CPT

## 2020-03-18 RX ADMIN — Medication 100 MILLIGRAM(S): at 09:01

## 2020-03-18 RX ADMIN — Medication 0.5 MILLIGRAM(S): at 22:02

## 2020-03-18 RX ADMIN — Medication 1 MILLIGRAM(S): at 09:02

## 2020-03-18 RX ADMIN — Medication 0.25 MILLIGRAM(S): at 14:58

## 2020-03-18 RX ADMIN — Medication 1 TABLET(S): at 09:01

## 2020-03-18 RX ADMIN — Medication 81 MILLIGRAM(S): at 09:00

## 2020-03-18 RX ADMIN — Medication 50 MILLIGRAM(S): at 02:47

## 2020-03-18 RX ADMIN — Medication 0.5 MILLIGRAM(S): at 10:08

## 2020-03-18 RX ADMIN — PANTOPRAZOLE SODIUM 40 MILLIGRAM(S): 20 TABLET, DELAYED RELEASE ORAL at 05:20

## 2020-03-18 NOTE — PROGRESS NOTE ADULT - SUBJECTIVE AND OBJECTIVE BOX
SUDHEER DELEON    [id number]    54y      Female    Patient is a 54y old  Female who presents with a chief complaint of EtOh withdrawal (17 Mar 2020 11:05)      INTERVAL HPI/OVERNIGHT EVENTS:    patient is has no tremulousness, as per nursing staff CIWA scale is Zero, she feel some nausea, as per her she vomited 1 times, she has no chest pain, sob, dizziness.     REVIEW OF SYSTEMS:    CONSTITUTIONAL: No fever, has fatigue  RESPIRATORY: No cough, No shortness of breath  CARDIOVASCULAR: No chest pain, palpitations  GASTROINTESTINAL: No abdominal, No nausea, vomiting  NEUROLOGICAL: No headaches,  loss of strength.  MISCELLANEOUS: left forearm and hand swelling       Vital Signs Last 24 Hrs  T(C): 37.1 (18 Mar 2020 07:43), Max: 37.2 (17 Mar 2020 16:06)  T(F): 98.7 (18 Mar 2020 07:43), Max: 98.9 (17 Mar 2020 16:06)  HR: 111 (18 Mar 2020 07:43) (85 - 111)  BP: 132/98 (18 Mar 2020 07:43) (114/75 - 132/98)  BP(mean): 88 (18 Mar 2020 00:05) (88 - 88)  RR: 19 (18 Mar 2020 07:43) (18 - 19)  SpO2: 96% (18 Mar 2020 07:43) (92% - 98%)    PHYSICAL EXAM:    GENERAL: Middle age female looking anxious   HEENT: Atraumatic, no tongue bite   NECK: soft, Supple, No JVD,   CHEST/LUNG: Clear to auscultate bilaterally; No wheezing  HEART: S1S2+, Regular rate and rhythm; No murmurs  ABDOMEN: Soft, Nontender, Nondistended; Bowel sounds present  EXTREMITIES:  1+ Peripheral Pulses, No edema, left forearm is swelling, no tenderness   SKIN: No rashes or lesions  NEURO: AOX3, able to move all extremities, no tremulousness, no tongue fasciculation   PSYCH: Anxious mood      LABS:    03-17    137  |  98  |  10.0  ----------------------------<  87  3.9   |  23.0  |  0.56    Ca    9.6      17 Mar 2020 07:33  Phos  3.5     03-17  Mg     2.1     03-17    TPro  7.3  /  Alb  4.2  /  TBili  0.4  /  DBili  0.1  /  AST  62<H>  /  ALT  67<H>  /  AlkPhos  157<H>  03-17            I&O's Summary    17 Mar 2020 07:01  -  18 Mar 2020 07:00  --------------------------------------------------------  IN: 118 mL / OUT: 0 mL / NET: 118 mL        MEDICATIONS  (STANDING):  aspirin enteric coated 81 milliGRAM(s) Oral daily  enoxaparin Injectable 40 milliGRAM(s) SubCutaneous daily  folic acid 1 milliGRAM(s) Oral daily  influenza   Vaccine 0.5 milliLiter(s) IntraMuscular once  multivitamin 1 Tablet(s) Oral daily  pantoprazole    Tablet 40 milliGRAM(s) Oral before breakfast  thiamine 100 milliGRAM(s) Oral daily    MEDICATIONS  (PRN):  LORazepam     Tablet 0.5 milliGRAM(s) Oral every 12 hours PRN Anxity and agitation  ondansetron    Tablet 4 milliGRAM(s) Oral every 6 hours PRN Nausea and/or Vomiting

## 2020-03-18 NOTE — BEHAVIORAL HEALTH ASSESSMENT NOTE - OTHER PAST PSYCHIATRIC HISTORY (INCLUDE DETAILS REGARDING ONSET, COURSE OF ILLNESS, INPATIENT/OUTPATIENT TREATMENT)
mutiple inpatient detox/rehab including [facility] 1/2020 x 5 days for detox, [place], [place], [place],

## 2020-03-18 NOTE — BEHAVIORAL HEALTH ASSESSMENT NOTE - RISK ASSESSMENT
Low Acute Suicide Risk Patient is a chronic risk 2/2 psychosocial stressors, mood symptoms, and ETOH abuse. She is not an imminent risk for she is treatment seeking, reports motivation for sobriety, has good relationship with daughter and is futuristic.

## 2020-03-18 NOTE — BEHAVIORAL HEALTH ASSESSMENT NOTE - NSBHCHARTREVIEWVS_PSY_A_CORE FT
ICU Vital Signs Last 24 Hrs  T(C): 37.1 (18 Mar 2020 07:43), Max: 37.2 (17 Mar 2020 16:06)  T(F): 98.7 (18 Mar 2020 07:43), Max: 98.9 (17 Mar 2020 16:06)  HR: 111 (18 Mar 2020 07:43) (88 - 111)  BP: 132/98 (18 Mar 2020 07:43) (114/75 - 132/98)  BP(mean): 88 (18 Mar 2020 00:05) (88 - 88)  ABP: --  ABP(mean): --  RR: 19 (18 Mar 2020 07:43) (19 - 19)  SpO2: 96% (18 Mar 2020 07:43) (92% - 97%)
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
## 2020-03-18 NOTE — BEHAVIORAL HEALTH ASSESSMENT NOTE - SUMMARY
Patient is a 53 y/o  female, domiciled alone, unemployed, non caretaker, PMI of reported SDH , TBI? PPH of severe AUD, (many inpatiet detox and rehabs), anxiety, and reported PTSD,  admitted to [facility] 3/15/2020 for severe alcohol withdrawals. Patient reported drinking 1-1.5liters of Vodka daily. While in ED patient received Versed 10 mg, Ativan 8 mg and phenobarb 130 mg x2 and was admitted to ICU. She experienced alcohol withdrawal delirium and was placed on constant observation. Psychiatry consulted for anxiety.  Patient is currently A&Ox3. She reports increased anxiety in the context of pending discharge. Patient has completed alcohol taper and CIWA is now 0. Patient denies current S/H/I/I/P, A/V/H and does not appear manic or psychotic. Discussed benefits of inpatient rehab and patient is not in agreement. At this time she is seeking to remain at [facility] and continue benzo taper. Discussed risk and benefits of starting low dose Remeron for sleep and mood symptoms and patient is in agreement. She also agrees to  [facility] referral for substance abuse/mental health treatment. Alcohol withdrawal delirium appears to be resolving.   Recommend  Please schedule appointment with [facility]  Start Remeron 15 mg hs Patient is a 55 y/o  female, domiciled alone, unemployed, non caretaker, PMI of reported SDH , TBI? PPH of severe AUD, (many inpatiet detox and rehabs), anxiety, and reported PTSD,  admitted to [facility] 3/15/2020 for severe alcohol withdrawals. Patient reported drinking 1-1.5liters of Vodka daily. While in ED patient received Versed 10 mg, Ativan 8 mg and phenobarb 130 mg x2 and was admitted to ICU. She experienced alcohol withdrawal delirium and was placed on constant observation. Psychiatry consulted for anxiety.  Patient is currently A&Ox3. She reports increased anxiety in the context of pending discharge. Patient has completed alcohol taper and CIWA is now 0. Patient denies current S/H/I/I/P, A/V/H and does not appear manic or psychotic. Discussed benefits of inpatient rehab and patient is not in agreement. At this time she is seeking to remain at [facility] and continue benzo taper. Discussed risk and benefits of starting low dose Remeron for sleep and mood symptoms and patient is in agreement. She also agrees to  [facility] referral for substance abuse/mental health treatment. Alcohol withdrawal delirium appears to be resolving.   Recommend  Please schedule appointment with [facility]  Start Remeron 15 mg hs when liver enzymes improve

## 2020-03-18 NOTE — PROGRESS NOTE ADULT - ASSESSMENT
54 F w Hx EtOH abuse, TBI and ?SAH/SDH presented w/ altered mental status, She drinks approximately 1-1.5L vodka per day and her last drink was supposedly night before admission, She is also on Klonopin but does not remember taking it for months, Denied any other substance abuse. No recent falls but was c/o L hand pain to the ED physician earlier, she was hallucinating and c/o feeling ants on her skin and occasional visual hallucinations in the ED, She was given ~10mg Versed, ~ 8mg Ativan and 130mg x 2 phenobarb in the ED and was admitted in ICU, she was started on CIWA protocol. 1:1 observations. CTH grossly normal, Started on Phenobarb 130mg q6h along w/ 130mg q2 PRN along w/ thiamine, FA and multivitamins, patient has no more hallucination, patient is being accepted for further management under hospitalist services.     Plan:     Alcohol Withdrawal: Seizure precaution, fall precaution, will continue with Librium as per CIWA protocol, will continue with thiamine, folate and multivitamins as there is likely deficiency, as per patient she has sister who is heavy drinker, she comes to her and she start drinking,  consult appreciated, her CIWA scale is being done and she is not scoring much, she was given choices for the rehab program she does not wanted to go the the rehab program, she wanted to stay here and wanted prolong tapering of Librium, but she has not much symptoms and she is not scoring much on CIWA scale.     Hx of TBI, SHA and SDH: Will do seizure precaution, CT head done showed No acute intracranial hemorrhage, mass effect, or acute territorial infarction. Chronic small left frontal encephalomalacia. If symptoms persist, MR imaging is recommended, no more Seizures over the course.     Anxiety: Ativan PRN    Left forearm swelling: XR negative for fracture, US doppler done showed superficiale thrombophlebitis, will start baby aspirin, warm compresses, arm elevated, as per patient her SHA and SDH was long ago      DVT prophylaxis: Lovenox sc

## 2020-03-18 NOTE — BEHAVIORAL HEALTH ASSESSMENT NOTE - DESCRIPTION (FIRST USE, LAST USE, QUANTITY, FREQUENCY, DURATION)
see HPI linda postive for benzo's. Claims she has not been prescribed this medication in several years.

## 2020-03-18 NOTE — BEHAVIORAL HEALTH ASSESSMENT NOTE - NSBHCHARTREVIEWLAB_PSY_A_CORE FT
03-17    137  |  98  |  10.0  ----------------------------<  87  3.9   |  23.0  |  0.56    Ca    9.6      17 Mar 2020 07:33  Phos  3.5     03-17  Mg     2.1     03-17    TPro  7.3  /  Alb  4.2  /  TBili  0.4  /  DBili  0.1  /  AST  62<H>  /  ALT  67<H>  /  AlkPhos  157<H>  03-17    LIVER FUNCTIONS - ( 17 Mar 2020 07:33 )  Alb: 4.2 g/dL / Pro: 7.3 g/dL / ALK PHOS: 157 U/L / ALT: 67 U/L / AST: 62 U/L / GGT: x

## 2020-03-18 NOTE — CHART NOTE - NSCHARTNOTEFT_GEN_A_CORE
Medicine PA- PA initially contacted by nurse for pt. requesting remeron tonight. As per Dr. Paulino's (psych) note remeron can be started when liver enzymes improve. Stat CMP done to check levels:  03-18    135  |  99  |  20.0  ----------------------------<  100<H>  3.8   |  24.0  |  0.81    Ca    9.1      18 Mar 2020 21:57  Phos  3.5     03-17  Mg     2.1     03-17    TPro  6.9  /  Alb  4.2  /  TBili  <0.2<L>  /  DBili  0.1  /  AST  34<H>  /  ALT  48<H>  /  AlkPhos  138<H>  03-18    Enzymes still elevated and pt was told she can't start remeron tonight. During discussion pt. got agitated and angry and said she has " an elephant sitting on her chest now". VSS- 115/82-91-18-98.4-98% A stat EKG was ordered but pt refused exam and EKG. Pt wants vistaril now "to rest", agreed to 1x dose atarax 25 mg (hydroxyzine) Call PA if status changes.

## 2020-03-18 NOTE — BEHAVIORAL HEALTH ASSESSMENT NOTE - NSBHCHARTREVIEWINVESTIGATE_PSY_A_CORE FT
< from: 12 Lead ECG (03.15.20 @ 15:55) >      Ventricular Rate 95 BPM    Atrial Rate 95 BPM    P-R Interval 152 ms    QRS Duration 78 ms    Q-T Interval 376 ms    QTC Calculation(Bezet) 472 ms    P Axis 49 degrees    R Axis -2 degrees    T Axis 6 degrees    < end of copied text >

## 2020-03-18 NOTE — BEHAVIORAL HEALTH ASSESSMENT NOTE - DETAILS
NA reports past thoughts of passive suicidal ideation however states, I would never kill myself I have too much to look forward to DT's see hpi nausea

## 2020-03-18 NOTE — BEHAVIORAL HEALTH ASSESSMENT NOTE - NS ED BHA HEROIN OPIATES
----- Message from Catherine Medina sent at 6/13/2017 12:37 PM EDT -----  PATIENT CALLED TO SAY SHE NEEDS REFILL ON HER FIORICET -40 MG PRN .  SHE SAID SHE CALLED [facility] IN [place] AND THEY TOLD HER THAT THEY CALLED HERE AND WE WILL NOT RESPOND.  PLEASE CALL THIS IN TODAY FOR HER.  LAST FILLED 5/15/17  [phone]    SHE HAS HER FOLLOW UP ALREADY SET UP WITH DR WAKEFIELD      i have  Talked to two different people at [facility] in [place] and told them that this was not due to be filled, as the medications was to last the pt for 30 days,  As she has been informed many times by dr wakefield.   This has already been sent to [facility].      None known

## 2020-03-19 ENCOUNTER — TRANSCRIPTION ENCOUNTER (OUTPATIENT)
Age: 55
End: 2020-03-19

## 2020-03-19 PROCEDURE — 99232 SBSQ HOSP IP/OBS MODERATE 35: CPT

## 2020-03-19 RX ORDER — THIAMINE MONONITRATE (VIT B1) 100 MG
1 TABLET ORAL
Qty: 0 | Refills: 0 | DISCHARGE
Start: 2020-03-19

## 2020-03-19 RX ORDER — FOLIC ACID 0.8 MG
1 TABLET ORAL
Qty: 0 | Refills: 0 | DISCHARGE
Start: 2020-03-19

## 2020-03-19 RX ORDER — ACETAMINOPHEN 500 MG
650 TABLET ORAL EVERY 12 HOURS
Refills: 0 | Status: DISCONTINUED | OUTPATIENT
Start: 2020-03-19 | End: 2020-03-20

## 2020-03-19 RX ORDER — HYDROXYZINE HCL 10 MG
25 TABLET ORAL ONCE
Refills: 0 | Status: COMPLETED | OUTPATIENT
Start: 2020-03-19 | End: 2020-03-19

## 2020-03-19 RX ADMIN — Medication 1 TABLET(S): at 10:39

## 2020-03-19 RX ADMIN — Medication 1 MILLIGRAM(S): at 10:39

## 2020-03-19 RX ADMIN — Medication 100 MILLIGRAM(S): at 10:39

## 2020-03-19 RX ADMIN — Medication 25 MILLIGRAM(S): at 00:58

## 2020-03-19 RX ADMIN — Medication 650 MILLIGRAM(S): at 16:57

## 2020-03-19 RX ADMIN — Medication 25 MILLIGRAM(S): at 10:37

## 2020-03-19 RX ADMIN — Medication 81 MILLIGRAM(S): at 10:39

## 2020-03-19 RX ADMIN — Medication 25 MILLIGRAM(S): at 21:19

## 2020-03-19 RX ADMIN — PANTOPRAZOLE SODIUM 40 MILLIGRAM(S): 20 TABLET, DELAYED RELEASE ORAL at 05:00

## 2020-03-19 NOTE — DISCHARGE NOTE PROVIDER - HOSPITAL COURSE
54 F w Hx EtOH abuse, TBI and ?SAH/SDH presented w/ altered mental status, She drinks approximately 1-1.5L vodka per day and her last drink was supposedly night before admission, She is also on Klonopin but does not remember taking it for months, Denied any other substance abuse. No recent falls but was c/o L hand pain to the ED physician earlier, she was hallucinating and c/o feeling ants on her skin and occasional visual hallucinations in the ED, She was given ~10mg Versed, ~ 8mg Ativan and 130mg x 2 phenobarb in the ED and was admitted in ICU, she was started on CIWA protocol. 1:1 observations. CTH grossly normal, Started on Phenobarb 130mg q6h along w/ 130mg q2 PRN along w/ thiamine, FA and multivitamins, patient has no more hallucination, patient was transferred under hospitalist services, continued on Seizure precaution, fall precaution, continue with Librium as per CIWA protocol along with thiamine, folate and multivitamins, as per patient she has sister who is heavy drinker, she comes to her and she start drinking,  consult appreciated, her CIWA scale is being done and she is not scoring much, she was given choices for the rehab program she does not wanted to go the rehab program, sshe has been tapered off from Librium slowly she is not scoring much on CIWA scale, she is being discharge home with and family league service appointment for Monday social service made aware and they arranged for the patient.     She has Hx of TBI, SHA and SDH, CT head done showed No acute intracranial hemorrhage, mass effect, or acute territorial infarction. Chronic small left frontal encephalomalacia.      she was noted ti have Anxiety she was Ativan PRN, Psych consult appreciated, she has some transaminitis so could not give Remeron, she will have follow up with family [facility].      She was noted to have Left forearm swelling: XR negative for fracture, US doppler done showed superficiale thrombophlebitis, she was given baby aspirin, warm compresses, arm elevated, as per patient her SHA and SDH was long ago inn 2012 and 2017 and both were due to fall, her swelling is better.     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 No recent falls but was c/o L hand pain to the ED physician earlier, she was hallucinating and c/o feeling ants on her skin and occasional visual hallucinations in the ED, She was given ~10mg Versed, ~ 8mg Ativan and 130mg x 2 phenobarb in the ED and was admitted in ICU, she was started on CIWA protocol. 1:1 observations. CTH grossly normal, Started on Phenobarb 130mg q6h along w/ 130mg q2 PRN along w/ thiamine, FA and multivitamins, patient has no more hallucination, patient was transferred under hospitalist services, continued on Seizure precaution, fall precaution, continue with Librium as per CIWA protocol along with thiamine, folate and multivitamins, as per patient she has sister who is heavy drinker, she comes to her and she start drinking,  consult appreciated, her CIWA scale is being done and she is not scoring much, she was given choices for the rehab program she does not wanted to go the rehab program, sshe has been tapered off from Librium slowly she is not scoring much on CIWA scale, she is being discharge home with and family [employer] [facility] contact info to make an appiontment.     She has Hx of TBI, SHA and SDH, CT head done showed No acute intracranial hemorrhage, mass effect, or acute territorial infarction. Chronic small left frontal encephalomalacia.      she was noted ti have Anxiety she was Ativan PRN, Psych consult appreciated, she has some transaminitis so could not give Remeron, she will have follow up with family [facility].      She was noted to have Left forearm swelling: XR negative for fracture, US doppler done showed superficiale thrombophlebitis, she was given baby aspirin, warm compresses, arm elevated, as per patient her SHA and SDH was long ago inn 2012 and 2017 and both were due to fall, her swelling is better.     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         Vital Signs Last 24 Hrs    T(C): 37 (20 Mar 2020 08:00), Max: 37 (20 Mar 2020 08:00)    T(F): 98.6 (20 Mar 2020 08:00), Max: 98.6 (20 Mar 2020 08:00)    HR: 84 (20 Mar 2020 08:00) (84 - 94)    BP: 123/86 (20 Mar 2020 08:00) (91/70 - 123/86)    RR: 19 (20 Mar 2020 08:00) (19 - 20)    SpO2: 96% (19 Mar 2020 16:08) (96% - 96%)        PHYSICAL EXAM:        GENERAL: Middle age female looking comfortable      HEENT: PERRL, +EOMI    NECK: soft, Supple, No JVD,     CHEST/LUNG: Clear to auscultate bilaterally; No wheezing    HEART: S1S2+, Regular rate and rhythm; No murmurs    ABDOMEN: Soft, Nontender, Nondistended; Bowel sounds present    EXTREMITIES:  2+ Peripheral Pulses, No edema    SKIN: No rashes or lesions    NEURO: AAOX3, no focal deficits, no motor r sensory loss    PSYCH: normal mood        Total time spent 38minutes

## 2020-03-19 NOTE — DISCHARGE NOTE PROVIDER - PROVIDER TOKENS
FREE:[LAST:[PMD],PHONE:[(   )    -],FAX:[(   )    -],ADDRESS:[in 1 week, please call the office and get an appiontment]],FREE:[LAST:[[facility] [employer]],PHONE:[(   )    -],FAX:[(   )    -],ADDRESS:[As scheduled]]

## 2020-03-19 NOTE — DISCHARGE NOTE PROVIDER - CARE PROVIDER_API CALL
PMD,   in 1 week, please call the office and get an appiontment  Phone: (   )    -  Fax: (   )    -  Follow Up Time:     [facility],   As scheduled  Phone: (   )    -  Fax: (   )    -  Follow Up Time:

## 2020-03-19 NOTE — DISCHARGE NOTE PROVIDER - NSDCMRMEDTOKEN_GEN_ALL_CORE_FT
folic acid 1 mg oral tablet: 1 tab(s) orally once a day  KlonoPIN 1 mg oral tablet: 1 tab(s) orally 3 times a day, As Needed  Multiple Vitamins oral tablet: 1 tab(s) orally once a day  Nyquil Cold Medicine oral liquid:   thiamine 100 mg oral tablet: 1 tab(s) orally once a day folic acid 1 mg oral tablet: 1 tab(s) orally once a day  LORazepam 0.5 mg oral tablet: 1 tab(s) orally once a day, As Needed -Anxity and agitation MDD:2tabs a day  Multiple Vitamins oral tablet: 1 tab(s) orally once a day  thiamine 100 mg oral tablet: 1 tab(s) orally once a day

## 2020-03-19 NOTE — DISCHARGE NOTE PROVIDER - NSDCCPCAREPLAN_GEN_ALL_CORE_FT
PRINCIPAL DISCHARGE DIAGNOSIS  Diagnosis: Delirium, alcoholic  Assessment and Plan of Treatment:       SECONDARY DISCHARGE DIAGNOSES  Diagnosis: Anxiety  Assessment and Plan of Treatment:     Diagnosis: Transaminitis  Assessment and Plan of Treatment:     Diagnosis: Alcohol withdrawal  Assessment and Plan of Treatment:

## 2020-03-19 NOTE — DISCHARGE NOTE PROVIDER - NSDCFUADDINST_GEN_ALL_CORE_FT
if you arm is swellin please call your PMD to get checked, if you have pain take ibuprofen for couple of days, warm compresses

## 2020-03-19 NOTE — PROGRESS NOTE ADULT - SUBJECTIVE AND OBJECTIVE BOX
SUDHEER DELEON    [id number]    54y      Female    Patient is a 54y old  Female who presents with a chief complaint of EtOh withdrawal (18 Mar 2020 11:20)      INTERVAL HPI/OVERNIGHT EVENTS:    patient has no tremulousness, she is not scorning on CIWA scale as per nursing staff, no episode of confusion or hallucination, she has no chest pain, sob, dizziness, nausea or vomiting, as per her she was sweating last night     REVIEW OF SYSTEMS:    CONSTITUTIONAL: No fever, has fatigue  RESPIRATORY: No cough, No shortness of breath  CARDIOVASCULAR: No chest pain, palpitations  GASTROINTESTINAL: No abdominal, No nausea, vomiting  NEUROLOGICAL: No headaches,  loss of strength.  MISCELLANEOUS: left forearm and hand swelling is improving.          Vital Signs Last 24 Hrs  T(C): 36.6 (19 Mar 2020 08:15), Max: 37.1 (18 Mar 2020 15:35)  T(F): 97.8 (19 Mar 2020 08:15), Max: 98.8 (18 Mar 2020 15:35)  HR: 90 (19 Mar 2020 08:15) (90 - 94)  BP: 97/67 (19 Mar 2020 08:15) (97/67 - 115/82)  RR: 18 (19 Mar 2020 08:15) (18 - 18)  SpO2: 98% (19 Mar 2020 00:30) (95% - 98%)    PHYSICAL EXAM:    GENERAL: Middle age female looking comfortable and less anxious   HEENT: Atraumatic, no tongue bite   NECK: soft, Supple, No JVD,   CHEST/LUNG: Clear to auscultate bilaterally; No wheezing  HEART: S1S2+, Regular rate and rhythm; No murmurs  ABDOMEN: Soft, Nontender, Nondistended; Bowel sounds present  EXTREMITIES:  1+ Peripheral Pulses, No edema, left forearm is swelling, no tenderness   SKIN: No rashes or lesions  NEURO: AOX3, able to move all extremities, no tremulousness, no tongue fasciculation   PSYCH: less Anxious mood      LABS:    03-18    135  |  99  |  20.0  ----------------------------<  100<H>  3.8   |  24.0  |  0.81    Ca    9.1      18 Mar 2020 21:57    TPro  6.9  /  Alb  4.2  /  TBili  <0.2<L>  /  DBili  0.1  /  AST  34<H>  /  ALT  48<H>  /  AlkPhos  138<H>  03-18            I&O's Summary      MEDICATIONS  (STANDING):  aspirin enteric coated 81 milliGRAM(s) Oral daily  chlordiazePOXIDE 25 milliGRAM(s) Oral two times a day  enoxaparin Injectable 40 milliGRAM(s) SubCutaneous daily  folic acid 1 milliGRAM(s) Oral daily  influenza   Vaccine 0.5 milliLiter(s) IntraMuscular once  multivitamin 1 Tablet(s) Oral daily  pantoprazole    Tablet 40 milliGRAM(s) Oral before breakfast  thiamine 100 milliGRAM(s) Oral daily    MEDICATIONS  (PRN):  LORazepam     Tablet 0.5 milliGRAM(s) Oral every 12 hours PRN Anxity and agitation  ondansetron    Tablet 4 milliGRAM(s) Oral every 6 hours PRN Nausea and/or Vomiting

## 2020-03-19 NOTE — PROGRESS NOTE ADULT - ASSESSMENT
54 F w Hx EtOH abuse, TBI and ?SAH/SDH presented w/ altered mental status, She drinks approximately 1-1.5L vodka per day and her last drink was supposedly night before admission, She is also on Klonopin but does not remember taking it for months, Denied any other substance abuse. No recent falls but was c/o L hand pain to the ED physician earlier, she was hallucinating and c/o feeling ants on her skin and occasional visual hallucinations in the ED, She was given ~10mg Versed, ~ 8mg Ativan and 130mg x 2 phenobarb in the ED and was admitted in ICU, she was started on CIWA protocol. 1:1 observations. CTH grossly normal, Started on Phenobarb 130mg q6h along w/ 130mg q2 PRN along w/ thiamine, FA and multivitamins, patient has no more hallucination, patient is being accepted for further management under hospitalist services.     Plan:     Alcohol Withdrawal: Seizure precaution, fall precaution, will continue with Librium as per CIWA protocol, will continue with thiamine, folate and multivitamins as there is likely deficiency, as per patient she has sister who is heavy drinker, she comes to her and she start drinking,  consult appreciated, her CIWA scale is being done and she is not scoring much, she was given choices for the rehab program she does not wanted to go the the rehab program, she wanted to stay here and wanted prolong tapering of Librium, but she has not much symptoms and she is not scoring much on CIWA scale, she is not comfortable to go home and would like to take 2 more dose of librium and then go home with anxiety medication tomorrow to go home with and family league service appointment for Monday social service made aware and they are working on it.     Hx of TBI, SHA and SDH: Will do seizure precaution, CT head done showed No acute intracranial hemorrhage, mass effect, or acute territorial infarction. Chronic small left frontal encephalomalacia. If symptoms persist, MR imaging is recommended, no more Seizures over the course.     Anxiety: Ativan PRN, Psych consult appreciated, she has some transaminitis so could not give Remeron, [facility]      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     DVT prophylaxis: Lovenox sc

## 2020-03-20 ENCOUNTER — TRANSCRIPTION ENCOUNTER (OUTPATIENT)
Age: 55
End: 2020-03-20

## 2020-03-20 VITALS
HEART RATE: 84 BPM | RESPIRATION RATE: 18 BRPM | SYSTOLIC BLOOD PRESSURE: 112 MMHG | DIASTOLIC BLOOD PRESSURE: 75 MMHG | OXYGEN SATURATION: 96 % | TEMPERATURE: 98 F

## 2020-03-20 PROCEDURE — 73130 X-RAY EXAM OF HAND: CPT

## 2020-03-20 PROCEDURE — 80048 BASIC METABOLIC PNL TOTAL CA: CPT

## 2020-03-20 PROCEDURE — 85610 PROTHROMBIN TIME: CPT

## 2020-03-20 PROCEDURE — 93005 ELECTROCARDIOGRAM TRACING: CPT

## 2020-03-20 PROCEDURE — 99239 HOSP IP/OBS DSCHRG MGMT >30: CPT

## 2020-03-20 PROCEDURE — 36415 COLL VENOUS BLD VENIPUNCTURE: CPT

## 2020-03-20 PROCEDURE — 82962 GLUCOSE BLOOD TEST: CPT

## 2020-03-20 PROCEDURE — 93971 EXTREMITY STUDY: CPT

## 2020-03-20 PROCEDURE — 85730 THROMBOPLASTIN TIME PARTIAL: CPT

## 2020-03-20 PROCEDURE — 96376 TX/PRO/DX INJ SAME DRUG ADON: CPT

## 2020-03-20 PROCEDURE — 99285 EMERGENCY DEPT VISIT HI MDM: CPT | Mod: 25

## 2020-03-20 PROCEDURE — 96375 TX/PRO/DX INJ NEW DRUG ADDON: CPT

## 2020-03-20 PROCEDURE — 80307 DRUG TEST PRSMV CHEM ANLYZR: CPT

## 2020-03-20 PROCEDURE — 84100 ASSAY OF PHOSPHORUS: CPT

## 2020-03-20 PROCEDURE — 82248 BILIRUBIN DIRECT: CPT

## 2020-03-20 PROCEDURE — 84702 CHORIONIC GONADOTROPIN TEST: CPT

## 2020-03-20 PROCEDURE — 83735 ASSAY OF MAGNESIUM: CPT

## 2020-03-20 PROCEDURE — 85027 COMPLETE CBC AUTOMATED: CPT

## 2020-03-20 PROCEDURE — 70450 CT HEAD/BRAIN W/O DYE: CPT

## 2020-03-20 PROCEDURE — 80053 COMPREHEN METABOLIC PANEL: CPT

## 2020-03-20 PROCEDURE — 80076 HEPATIC FUNCTION PANEL: CPT

## 2020-03-20 PROCEDURE — 83605 ASSAY OF LACTIC ACID: CPT

## 2020-03-20 PROCEDURE — 96374 THER/PROPH/DIAG INJ IV PUSH: CPT

## 2020-03-20 PROCEDURE — 83690 ASSAY OF LIPASE: CPT

## 2020-03-20 RX ORDER — CLONAZEPAM 1 MG
1 TABLET ORAL
Qty: 0 | Refills: 0 | DISCHARGE

## 2020-03-20 RX ORDER — DM/P-EPHED/ACETAMINOPH/DOXYLAM
0 LIQUID (ML) ORAL
Qty: 0 | Refills: 0 | DISCHARGE

## 2020-03-20 RX ADMIN — Medication 0.5 MILLIGRAM(S): at 08:27

## 2020-03-20 NOTE — DISCHARGE NOTE NURSING/CASE MANAGEMENT/SOCIAL WORK - PATIENT PORTAL LINK FT
You can access the FollowMyHealth Patient Portal offered by [facility] by registering at the following website: [email or web address]. By joining [email or web address]’s FollowMyHealth portal, you will also be able to view your health information using other applications (apps) compatible with our system.

## 2020-07-28 NOTE — BEHAVIORAL HEALTH ASSESSMENT NOTE - GAIT / STATION
Patient c/o HA, nausea and sore throat x about 1 week. Denies being around anyone who has tested positive for covid.
Other

## 2020-11-01 PROCEDURE — G9001: CPT

## 2020-11-01 PROCEDURE — G9005: CPT

## 2020-11-02 ENCOUNTER — RESULT REVIEW (OUTPATIENT)
Age: 55
End: 2020-11-02

## 2020-12-01 ENCOUNTER — OUTPATIENT (OUTPATIENT)
Dept: OUTPATIENT SERVICES | Facility: HOSPITAL | Age: 55
LOS: 1 days | End: 2020-12-01

## 2020-12-01 DIAGNOSIS — Z98.890 OTHER SPECIFIED POSTPROCEDURAL STATES: Chronic | ICD-10-CM

## 2020-12-01 DIAGNOSIS — Z90.89 ACQUIRED ABSENCE OF OTHER ORGANS: Chronic | ICD-10-CM

## 2020-12-01 DIAGNOSIS — Z98.891 HISTORY OF UTERINE SCAR FROM PREVIOUS SURGERY: Chronic | ICD-10-CM

## 2020-12-11 NOTE — ED PROVIDER NOTE - DISCHARGE DATE
[address] [facility] ordered - CMP, CBC with diff, and TSH. Yes, virtual visit is fine. 03-Jan-2019

## 2020-12-23 DIAGNOSIS — Z71.89 OTHER SPECIFIED COUNSELING: ICD-10-CM

## 2021-01-01 ENCOUNTER — OUTPATIENT (OUTPATIENT)
Dept: OUTPATIENT SERVICES | Facility: HOSPITAL | Age: 56
LOS: 1 days | End: 2021-01-01
Payer: MEDICAID

## 2021-01-01 DIAGNOSIS — Z90.89 ACQUIRED ABSENCE OF OTHER ORGANS: Chronic | ICD-10-CM

## 2021-01-01 DIAGNOSIS — Z98.890 OTHER SPECIFIED POSTPROCEDURAL STATES: Chronic | ICD-10-CM

## 2021-01-01 DIAGNOSIS — Z98.891 HISTORY OF UTERINE SCAR FROM PREVIOUS SURGERY: Chronic | ICD-10-CM

## 2021-01-22 DIAGNOSIS — Z71.89 OTHER SPECIFIED COUNSELING: ICD-10-CM

## 2021-03-01 PROCEDURE — G9005: CPT

## 2021-03-02 NOTE — ED PROVIDER NOTE - CHIEF COMPLAINT
The patient is a 54y Female complaining of alcohol intoxication.
Hospitalist
Nephrology
Nephrology
Hospitalist
Hospitalist
Nephrology
Nephrology

## 2021-05-18 NOTE — ED ADULT NURSE NOTE - NS ED NOTE  TALK SOMEONE YN
SUBJECTIVE  Vita Vail is a 43 year old female presenting for follow-up of ROSIE.  In lab sleep study completed at [facility] on 2021  Mild  ROSIE documented.  AHI-9.5 based on 4% desaturation criteria for hypopneas.  REM AHI-28.7  Presently on CPAP at a pressure of 8 cwp.  Tolerating well other than some soreness around her nose from the nasal mask..  Wakes up feeling refreshed and excessive daytime somnolence has resolved.    Compliance suboptimal.  She has skipped a number of days secondary to sinus issues.  Average AHI 0.8.  Average utilization 6 hours 43 minutes but only using better than 4 hours on 56% of nights.  Download from -2021 reviewed.      Social History:  Social History     Tobacco Use   • Smoking status: Former Smoker     Packs/day: 0.50     Years: 13.00     Pack years: 6.50     Types: Cigarettes     Start date:      Quit date:      Years since quittin.3   • Smokeless tobacco: Never Used   Substance Use Topics   • Alcohol use: Yes     Patient Active Problem List   Diagnosis   • At high risk for breast cancer   • Breast calcifications on mammogram   • Neoplasm of breast   • Inconclusive mammogram due to dense breasts   • Abnormal mammogram   • Abnormal ultrasound of breast   • Other hyperlipidemia   • Hyperlipidemia   • Sleep apnea     Medications:  Current Outpatient Medications   Medication Sig Dispense Refill   • FLUoxetine (PROzac) 10 MG capsule Take 1 capsule by mouth daily. 7 capsule 0   • FLUoxetine (PROzac) 20 MG capsule Take 1 capsule by mouth daily. 90 capsule 1   • atorvastatin (LIPITOR) 10 MG tablet Take 1 tablet by mouth daily. 90 tablet 3   • valACYclovir (VALTREX) 500 MG tablet Take 1 tablet by mouth daily. 90 tablet 3   • rizatriptan (MAXALT) 10 MG tablet TAKE 1 TABLET BY MOUTH AS NEEDED FOR MIGRAINE. MAY REPEAT IN 2 HOURS AS NEEDED       No current facility-administered medications for this visit.       OBJECTIVE:  Visit Vitals  LMP 2020  Comment: pt had an ablation       ASSESSMENT:  ROSIE on CPAP at 8 CWP.  Treatment has been effective and beneficial but suboptimal utilization initially.    PLAN:  Encourage nightly use  Repeat download in 30 days  Follow-up in 1 year    Electronically signed: Cliff Yadav MD     No

## 2021-06-02 NOTE — ED ADULT NURSE NOTE - GENITOURINARY ASSESSMENT
What Type Of Note Output Would You Prefer (Optional)?: Bullet Format What Is The Reason For Today's Visit?: Full Body Skin Examination What Is The Reason For Today's Visit? (Being Monitored For X): the development of new lesions Additional History: Pt here for a fbse with Spots of concern on face and behind left ear. Pt has hx of bcc and scc. WDL

## 2021-09-30 NOTE — ED ADULT TRIAGE NOTE - ESI TRIAGE ACUITY LEVEL, MLM
"Oncology Rooming Note    September 30, 2021 10:07 AM   Ramu Espinoza is a 62 year old male who presents for:    No chief complaint on file.    Initial Vitals: /62   Pulse 58   Temp 98.4  F (36.9  C)   Resp 16   Wt 118.4 kg (261 lb)   SpO2 96%   BMI 33.51 kg/m   Estimated body mass index is 33.51 kg/m  as calculated from the following:    Height as of 1/22/21: 1.88 m (6' 2\").    Weight as of this encounter: 118.4 kg (261 lb). Body surface area is 2.49 meters squared.  Mild Pain (3) Comment: Data Unavailable   No LMP for male patient.  Allergies reviewed: Yes  Medications reviewed: Yes    Medications: Medication refills not needed today.  Pharmacy name entered into [facility]:    [id number] - [place], [place] - [address] AT [facility] [address]  [facility] - [place], [place] - [address]        Jeanette Omer RN              " 2

## 2021-10-29 NOTE — ED PROVIDER NOTE - DISPOSITION TYPE
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 Patient was noted to be nonverbal, chronically ill looking, contracted and in respiratory distress. Initially oxygen saturations were in the 80s. She was placed on a nonrebreather. CTA of the chest showed multi focal airspace disease and a small cavitary lesion in the left lower lobe. Patient was admitted and started on Zosyn. Family requested DNR CODE STATUS although did agree to intubation if needed    Labs showed a sodium of 164    Patient was initially weaned to room air but on the evening of 10/19 she desaturated and was transferred to the ICU. She was quickly weaned back to room air. She was then transferred back to the floor. NG tube placed and she was started on tube feeds. GI was consulted for consideration of PEG tube    On 10/24 patient redeveloped fever and became hypoxic again, was restarted on oxygen. Patient was transferred back again to the ICU. Patient required intubation early this morning emergently due to worsening respiratory status. PO2 was only 41 on nonrebreather mask    Patient became hypotensive and was started on norepinephrine    ------    Patient is seen today for follow-up. She was successfully extubated yesterday and currently appears to be breathing comfortably on nasal cannula. She is somewhat alert. Seems to be following some simple commands with eye movements.     CT of the chest without contrast was done late last night but has not been read yet    Problem List:  Problem List as of 10/29/2021 Never Reviewed        Codes Class Noted - Resolved    Severe protein-calorie malnutrition ([facility] [address] ICD-10-CM: E43  ICD-9-CM: 262  10/18/2021 - Present        Acute respiratory failure ([facility] [address] ICD-10-CM: J96.00  ICD-9-CM: 518.81  10/17/2021 - Present        Failure to thrive in adult ICD-10-CM: R62.7  ICD-9-CM: 783.7  10/17/2021 - Present        Multifocal pneumonia ICD-10-CM: J18.9  ICD-9-CM: 888  10/17/2021 - Present              Medications reviewed  Current Facility-Administered Medications   Medication Dose Route Frequency    NOREPINephrine (LEVOPHED) 8 mg in 5% dextrose 250mL (32 mcg/mL) infusion  0.5-16 mcg/min IntraVENous TITRATE    propofol (DIPRIVAN) 10 mg/mL infusion  0-50 mcg/kg/min IntraVENous TITRATE    zinc oxide-white petrolatum 17-57 % topical paste   Topical TID    albuterol-ipratropium (DUO-NEB) 2.5 MG-0.5 MG/3 ML  3 mL Nebulization Q6HWA RT    albuterol-ipratropium (DUO-NEB) 2.5 MG-0.5 MG/3 ML  3 mL Nebulization Q6H PRN    sorbitoL 70 % solution 30 mL  30 mL Per G Tube DAILY    glycopyrrolate (ROBINUL) tablet 0.5 mg  0.5 mg Per G Tube BID    docusate (COLACE) 50 mg/5 mL oral liquid 100 mg  100 mg Per G Tube BID    potassium chloride (KLOR-CON) packet for solution 20 mEq  20 mEq Per NG tube TID WITH MEALS    [Held by provider] mirtazapine (REMERON) tablet 15 mg  15 mg Oral QHS    sodium chloride (NS) flush 5-40 mL  5-40 mL IntraVENous Q8H    sodium chloride (NS) flush 5-40 mL  5-40 mL IntraVENous PRN    acetaminophen (TYLENOL) tablet 650 mg  650 mg Oral Q6H PRN    Or    acetaminophen (TYLENOL) suppository 650 mg  650 mg Rectal Q6H PRN    polyethylene glycol (MIRALAX) packet 17 g  17 g Oral DAILY PRN    ondansetron (ZOFRAN ODT) tablet 4 mg  4 mg Oral Q8H PRN    Or    ondansetron (ZOFRAN) injection 4 mg  4 mg IntraVENous Q6H PRN    enoxaparin (LOVENOX) injection 40 mg  40 mg SubCUTAneous DAILY    piperacillin-tazobactam (ZOSYN) 3.375 g in 0.9% sodium chloride (MBP/ADV) 100 mL MBP  3.375 g IntraVENous Q8H       Review of Systems:   Review of systems not obtained due to patient factors.     Objective:   Physical Exam:     Visit Vitals  BP (!) 115/91 (BP Patient Position: At rest) Pulse 83   Temp 99 °F (37.2 °C)   Resp 15   Ht 5' 6\" (1.676 m)   Wt 34 kg (74 lb 15.3 oz)   LMP  (LMP Unknown)   SpO2 100%   BMI 12.10 kg/m²    O2 Flow Rate (L/min): 3 l/min O2 Device: Nasal cannula    Temp (24hrs), Av.4 °F (36.9 °C), Min:97.2 °F (36.2 °C), Max:99.3 °F (37.4 °C)    No intake/output data recorded. 10/27 1901 - 10/29 07  In: 995 [I.V.:100]  Out: -     General:   Unresponsive, cachectic and emaciated. Patient intubated   Lungs:    Rhonchi bilateral   Chest wall:  No tenderness or deformity. Heart:  Regular rate and rhythm, S1, S2 normal, no murmur, click, rub or gallop. Abdomen:   Soft, non-tender. Bowel sounds normal. No masses,  No organomegaly. Extremities:  Contracted   Pulses: 2+ and symmetric all extremities. Skin: Skin color, texture, turgor normal. No rashes or lesions   Neurologic: CNII-XII intact.   Contractures of upper and lower extremities         Data Review:       Recent Days:  Recent Labs     10/29/21  0539 10/27/21  0447   WBC 10.0 20.9*   HGB 10.0* 10.5*   HCT 31.0* 32.8*    240     Recent Labs     10/29/21  0539 10/27/21  0447    143  142   K 3.9 4.2  4.2   * 109*  109*   CO2 30 28  28   GLU 96 140*  139*   BUN 9 9  9   CREA <0.15* 0.26*  0.26*   CA 8.0* 7.9*  8.0*   PHOS 2.9 2.2*   ALB 1.7* 1.6*  1.7*   TBILI  --  0.5   ALT  --  40     Recent Labs     10/28/21  0330 10/27/21  0333   PH 7.52* 7.49*   PCO2 36 39   PO2 123* 217*   HCO3 30* 30*   FIO2 28.0 50.0       24 Hour Results:  Recent Results (from the past 24 hour(s))   GLUCOSE, POC    Collection Time: 10/28/21 12:16 PM   Result Value Ref Range    Glucose (POC) 122 (H) 65 - 117 mg/dL    Performed by CLARITZA DEE, POC    Collection Time: 10/28/21  6:01 PM   Result Value Ref Range    Glucose (POC) 80 65 - 117 mg/dL    Performed by Denver Schooner    CBC WITH AUTOMATED DIFF    Collection Time: 10/29/21  5:39 AM   Result Value Ref Range    WBC 10.0 3.6 - 11.0 K/uL    RBC 3.55 (L) 3.80 - 5.20 M/uL    HGB 10.0 (L) 11.5 - 16.0 g/dL    HCT 31.0 (L) 35.0 - 47.0 %    MCV 87.3 80.0 - 99.0 FL    MCH 28.2 26.0 - 34.0 PG    MCHC 32.3 30.0 - 36.5 g/dL    RDW 14.2 11.5 - 14.5 %    PLATELET 006 140 - 236 K/uL    MPV 9.7 8.9 - 12.9 FL    NRBC 0.0 0.0  WBC    ABSOLUTE NRBC 0.00 0.00 - 0.01 K/uL    NEUTROPHILS 85 (H) 32 - 75 %    LYMPHOCYTES 12 12 - 49 %    MONOCYTES 2 (L) 5 - 13 %    EOSINOPHILS 1 0 - 7 %    BASOPHILS 0 0 - 1 %    IMMATURE GRANULOCYTES 0 0 - 0.5 %    ABS. NEUTROPHILS 8.5 (H) 1.8 - 8.0 K/UL    ABS. LYMPHOCYTES 1.2 0.8 - 3.5 K/UL    ABS. MONOCYTES 0.2 0.0 - 1.0 K/UL    ABS. EOSINOPHILS 0.1 0.0 - 0.4 K/UL    ABS. BASOPHILS 0.0 0.0 - 0.1 K/UL    ABS. IMM. GRANS. 0.0 0.00 - 0.04 K/UL    DF AUTOMATED     RENAL FUNCTION PANEL    Collection Time: 10/29/21  5:39 AM   Result Value Ref Range    Sodium 142 136 - 145 mmol/L    Potassium 3.9 3.5 - 5.1 mmol/L    Chloride 109 (H) 97 - 108 mmol/L    CO2 30 21 - 32 mmol/L    Anion gap 3 (L) 5 - 15 mmol/L    Glucose 96 65 - 100 mg/dL    BUN 9 6 - 20 mg/dL    Creatinine <0.15 (L) 0.55 - 1.02 mg/dL    BUN/Creatinine ratio Not calculated 12 - 20      GFR est AA Not calculated >60 ml/min/1.73m2    GFR est non-AA Not calculated >60 ml/min/1.73m2    Calcium 8.0 (L) 8.5 - 10.1 mg/dL    Phosphorus 2.9 2.6 - 4.7 mg/dL    Albumin 1.7 (L) 3.5 - 5.0 g/dL   PROCALCITONIN    Collection Time: 10/29/21  5:39 AM   Result Value Ref Range    Procalcitonin 0.84 (H) 0 ng/mL       XR CHEST PORT   Final Result   Right perihilar infiltrative changes. Residual pneumomediastinum and   right subcutaneous emphysematous changes. XR CHEST PORT   Final Result      1. Similar appearance of lucencies paralleling the mediastinum that most likely   represent pneumomediastinum. Small anterior pneumothorax on the left is   difficult to completely exclude but overall appearance is similar to prior. Recommend continued attention on follow-up.    2. Pulmonary opacities appearing similar to prior. XR CHEST PORT   Final Result   FINDINGS: IMPRESSION: Single frontal view of the chest.      ET tube distal tip 6.7 cm above shahid. Normal heart size. Similar pneumomediastinum. The lungs are well inflated. Right perihilar bronchial thickening and opacity   unchanged. Left hand obscures the left lung. No sizable pleural effusion, lobar   consolidation, or evident pneumothorax. No free air under the diaphragm. PEG tube balloon projects over gastric air   bubble. Previous dense material in the expected location of esophagus has   resolved. Unchanged right greater than left subcutaneous emphysema. XR CHEST PORT   Final Result   FINDINGS: IMPRESSION: Single frontal view of the chest.      ET tube distal tip 6.5 cm above shahid. Normal heart size. Pneumomediastinum and subcutaneous emphysema has developed   since the prior study. No jacey evidence of pneumothorax. Called report to   patient's nurse HungPerry at 5:15 AM 10/27/2021. Left hand obscures the left hemithorax. Right parahilar bronchial thickening and   opacity unchanged. No lobar consolidation, sizable pleural effusion. Dense   material projects over the medial left lower hemithorax possibly tube feed   reflux in the esophagus. PEG tube balloon projects over the stomach. XR ABD PORT  1 V   Final Result      IR INSERT NON TUNL CVC OVER 5 YRS   Final Result   Successful placement of a triple-lumen central venous catheter. The catheter is   ready for use. IR US GUIDED VASCULAR ACCESS   Final Result   Successful placement of a triple-lumen central venous catheter. The catheter is   ready for use. XR ABD (KUB)   Final Result      XR CHEST PORT   Final Result   Endotracheal tube in satisfactory position. Otherwise stable exam.                XR CHEST PORT   Final Result   Elevation of left hemidiaphragm with atelectasis in left base.    Increased density in the perihilar regions which may represent atelectasis   and/or developing infiltrate. Aspiration could give this appearance. .                XR CHEST PORT   Final Result   Pulmonary hypoinflation. Unchanged cavitary lesion left lower lobe. XR CHEST PORT   Final Result      XR CHEST PORT   Final Result      XR CHEST PORT   Final Result   Feeding tube as above. No significant interval change. XR CHEST PORT   Final Result   FINDINGS: IMPRESSION: Single frontal view of the abdomen. Enteric tube passes below the left hemidiaphragm, coiled in the gastric fundus   region. Normal heart size. Increased hypoinflation and bilateral pulmonary airspace pneumonia and/or   atelectasis. Cavitary lesion left lower lobe as previous. No large pleural   effusion or pneumothorax. No free air under the diaphragm. XR CHEST PORT   Final Result      XR ABD (KUB)   Final Result      XR ABD PORT  1 V   Final Result      XR CHEST PORT   Final Result   FINDINGS: IMPRESSION: Single frontal view of the chest. Patient's left hand   obscures the left lung/hemithorax. Enteric tube distal tip below left hemidiaphragm. Normal heart size. Similar right perihilar and upper lobe airspace disease. No sizable pleural   effusion or pneumothorax. XR CHEST PORT   Final Result   Slightly retracted enteric tube, otherwise unchanged. XR CHEST PORT   Final Result   1. Enteric tube as above. 2. Persistent multifocal airspace disease with a cavitary lesion in the left   lateral lung. CTA CHEST W OR W WO CONT   Final Result   Negative for pulmonary embolus. Findings the lungs consistent with multilobar pneumonia with concomitant   bronchiolitis. The cavitary foci may represent pneumatoceles or potentially   septic emboli, echocardiogram recommended when clinically able. XR CHEST PORT   Final Result   1. Large right perihilar opacity, mass versus airspace disease. Further   characterization with CT is recommended. 2. Nodular opacity in the left midlung zone. Further characterization with CT is   recommended. XR NECK SOFT TISSUE   Final Result   No significant soft tissue swelling or radiodense foreign body. CT CHEST WO CONT    (Results Pending)        Assessment:  Multifocal bilateral pneumonia with cavitary lesion left lower lobe   -On Zosyn day #12    Acute respiratory failure with hypoxia, now requiring mechanical ventilation. Patient intubated 10/26   -Extubated 10/29    Pneumomediastinum, chest CT pending    Hypernatremia due to dehydration, improved    Severe sepsis with septic shock with tachycardia and elevated lactate. Present on admission    -Off pressors. WBC now normal    Hypokalemia. Repleted    Possible complicated UTI. Urine culture was negative    Hereditary spinocerebellar ataxia with chronic debilitation    Adult failure to thrive    Severe protein calorie malnutrition. Status post PEG tube this admission        Plan:  Await chest CT  Continue IV Zosyn  Continue tube feeds and supportive care    Transfer to floor if CT stable      Disposition: Continued inpatient care    Total time spent with patient: 30 minutes.     Meghana Hayes MD DISCHARGE

## 2021-11-01 ENCOUNTER — EMERGENCY (EMERGENCY)
Facility: HOSPITAL | Age: 56
LOS: 1 days | Discharge: HOME | End: 2021-11-01
Attending: STUDENT IN AN ORGANIZED HEALTH CARE EDUCATION/TRAINING PROGRAM
Payer: MEDICAID

## 2021-11-01 VITALS
SYSTOLIC BLOOD PRESSURE: 133 MMHG | DIASTOLIC BLOOD PRESSURE: 90 MMHG | RESPIRATION RATE: 16 BRPM | HEIGHT: 62 IN | OXYGEN SATURATION: 93 % | TEMPERATURE: 99 F | HEART RATE: 91 BPM | WEIGHT: 128.09 LBS

## 2021-11-01 DIAGNOSIS — Z98.891 HISTORY OF UTERINE SCAR FROM PREVIOUS SURGERY: Chronic | ICD-10-CM

## 2021-11-01 DIAGNOSIS — Z90.89 ACQUIRED ABSENCE OF OTHER ORGANS: Chronic | ICD-10-CM

## 2021-11-01 DIAGNOSIS — Z98.890 OTHER SPECIFIED POSTPROCEDURAL STATES: Chronic | ICD-10-CM

## 2021-11-01 PROBLEM — Z78.9 OTHER SPECIFIED HEALTH STATUS: Chronic | Status: ACTIVE | Noted: 2019-11-05

## 2021-11-01 PROBLEM — F10.10 ALCOHOL ABUSE, UNCOMPLICATED: Chronic | Status: ACTIVE | Noted: 2019-11-14

## 2021-11-01 LAB
ALBUMIN SERPL ELPH-MCNC: 4.6 G/DL — SIGNIFICANT CHANGE UP (ref 3.3–5.2)
ALP SERPL-CCNC: 120 U/L — SIGNIFICANT CHANGE UP (ref 40–120)
ALT FLD-CCNC: 11 U/L — SIGNIFICANT CHANGE UP
AMPHET UR-MCNC: NEGATIVE — SIGNIFICANT CHANGE UP
ANION GAP SERPL CALC-SCNC: 16 MMOL/L — SIGNIFICANT CHANGE UP (ref 5–17)
APPEARANCE UR: CLEAR — SIGNIFICANT CHANGE UP
AST SERPL-CCNC: 16 U/L — SIGNIFICANT CHANGE UP
BACTERIA # UR AUTO: ABNORMAL
BARBITURATES UR SCN-MCNC: NEGATIVE — SIGNIFICANT CHANGE UP
BASOPHILS # BLD AUTO: 0.04 K/UL — SIGNIFICANT CHANGE UP (ref 0–0.2)
BASOPHILS NFR BLD AUTO: 0.6 % — SIGNIFICANT CHANGE UP (ref 0–2)
BENZODIAZ UR-MCNC: NEGATIVE — SIGNIFICANT CHANGE UP
BILIRUB SERPL-MCNC: 0.3 MG/DL — LOW (ref 0.4–2)
BILIRUB UR-MCNC: NEGATIVE — SIGNIFICANT CHANGE UP
BUN SERPL-MCNC: 10.7 MG/DL — SIGNIFICANT CHANGE UP (ref 8–20)
CALCIUM SERPL-MCNC: 9.3 MG/DL — SIGNIFICANT CHANGE UP (ref 8.6–10.2)
CHLORIDE SERPL-SCNC: 105 MMOL/L — SIGNIFICANT CHANGE UP (ref 98–107)
CO2 SERPL-SCNC: 21 MMOL/L — LOW (ref 22–29)
COCAINE METAB.OTHER UR-MCNC: NEGATIVE — SIGNIFICANT CHANGE UP
COLOR SPEC: YELLOW — SIGNIFICANT CHANGE UP
CREAT SERPL-MCNC: 0.54 MG/DL — SIGNIFICANT CHANGE UP (ref 0.5–1.3)
DIFF PNL FLD: NEGATIVE — SIGNIFICANT CHANGE UP
EOSINOPHIL # BLD AUTO: 0.12 K/UL — SIGNIFICANT CHANGE UP (ref 0–0.5)
EOSINOPHIL NFR BLD AUTO: 1.8 % — SIGNIFICANT CHANGE UP (ref 0–6)
EPI CELLS # UR: SIGNIFICANT CHANGE UP
ETHANOL SERPL-MCNC: <10 MG/DL — SIGNIFICANT CHANGE UP (ref 0–9)
GLUCOSE SERPL-MCNC: 83 MG/DL — SIGNIFICANT CHANGE UP (ref 70–99)
GLUCOSE UR QL: NEGATIVE MG/DL — SIGNIFICANT CHANGE UP
HCT VFR BLD CALC: 41.5 % — SIGNIFICANT CHANGE UP (ref 34.5–45)
HGB BLD-MCNC: 14.1 G/DL — SIGNIFICANT CHANGE UP (ref 11.5–15.5)
IMM GRANULOCYTES NFR BLD AUTO: 0.3 % — SIGNIFICANT CHANGE UP (ref 0–1.5)
KETONES UR-MCNC: NEGATIVE — SIGNIFICANT CHANGE UP
LEUKOCYTE ESTERASE UR-ACNC: ABNORMAL
LYMPHOCYTES # BLD AUTO: 1.62 K/UL — SIGNIFICANT CHANGE UP (ref 1–3.3)
LYMPHOCYTES # BLD AUTO: 24.8 % — SIGNIFICANT CHANGE UP (ref 13–44)
MCHC RBC-ENTMCNC: 32.6 PG — SIGNIFICANT CHANGE UP (ref 27–34)
MCHC RBC-ENTMCNC: 34 GM/DL — SIGNIFICANT CHANGE UP (ref 32–36)
MCV RBC AUTO: 95.8 FL — SIGNIFICANT CHANGE UP (ref 80–100)
METHADONE UR-MCNC: NEGATIVE — SIGNIFICANT CHANGE UP
MONOCYTES # BLD AUTO: 0.49 K/UL — SIGNIFICANT CHANGE UP (ref 0–0.9)
MONOCYTES NFR BLD AUTO: 7.5 % — SIGNIFICANT CHANGE UP (ref 2–14)
NEUTROPHILS # BLD AUTO: 4.25 K/UL — SIGNIFICANT CHANGE UP (ref 1.8–7.4)
NEUTROPHILS NFR BLD AUTO: 65 % — SIGNIFICANT CHANGE UP (ref 43–77)
NITRITE UR-MCNC: NEGATIVE — SIGNIFICANT CHANGE UP
OPIATES UR-MCNC: POSITIVE
PCP SPEC-MCNC: SIGNIFICANT CHANGE UP
PCP UR-MCNC: NEGATIVE — SIGNIFICANT CHANGE UP
PH UR: 6.5 — SIGNIFICANT CHANGE UP (ref 5–8)
PLATELET # BLD AUTO: 346 K/UL — SIGNIFICANT CHANGE UP (ref 150–400)
POTASSIUM SERPL-MCNC: 4.5 MMOL/L — SIGNIFICANT CHANGE UP (ref 3.5–5.3)
POTASSIUM SERPL-SCNC: 4.5 MMOL/L — SIGNIFICANT CHANGE UP (ref 3.5–5.3)
PROT SERPL-MCNC: 7.1 G/DL — SIGNIFICANT CHANGE UP (ref 6.6–8.7)
PROT UR-MCNC: NEGATIVE MG/DL — SIGNIFICANT CHANGE UP
RAPID RVP RESULT: SIGNIFICANT CHANGE UP
RBC # BLD: 4.33 M/UL — SIGNIFICANT CHANGE UP (ref 3.8–5.2)
RBC # FLD: 12.5 % — SIGNIFICANT CHANGE UP (ref 10.3–14.5)
RBC CASTS # UR COMP ASSIST: SIGNIFICANT CHANGE UP /HPF (ref 0–4)
SARS-COV-2 RNA SPEC QL NAA+PROBE: SIGNIFICANT CHANGE UP
SODIUM SERPL-SCNC: 142 MMOL/L — SIGNIFICANT CHANGE UP (ref 135–145)
SP GR SPEC: 1 — LOW (ref 1.01–1.02)
THC UR QL: NEGATIVE — SIGNIFICANT CHANGE UP
UROBILINOGEN FLD QL: NEGATIVE MG/DL — SIGNIFICANT CHANGE UP
WBC # BLD: 6.54 K/UL — SIGNIFICANT CHANGE UP (ref 3.8–10.5)
WBC # FLD AUTO: 6.54 K/UL — SIGNIFICANT CHANGE UP (ref 3.8–10.5)
WBC UR QL: ABNORMAL

## 2021-11-01 PROCEDURE — 71045 X-RAY EXAM CHEST 1 VIEW: CPT | Mod: 26

## 2021-11-01 PROCEDURE — 74177 CT ABD & PELVIS W/CONTRAST: CPT | Mod: 26,MA

## 2021-11-01 PROCEDURE — 93010 ELECTROCARDIOGRAM REPORT: CPT

## 2021-11-01 PROCEDURE — 99285 EMERGENCY DEPT VISIT HI MDM: CPT

## 2021-11-01 RX ORDER — MORPHINE SULFATE 50 MG/1
4 CAPSULE, EXTENDED RELEASE ORAL ONCE
Refills: 0 | Status: DISCONTINUED | OUTPATIENT
Start: 2021-11-01 | End: 2021-11-01

## 2021-11-01 RX ORDER — ONDANSETRON 8 MG/1
4 TABLET, FILM COATED ORAL ONCE
Refills: 0 | Status: COMPLETED | OUTPATIENT
Start: 2021-11-01 | End: 2021-11-01

## 2021-11-01 RX ORDER — SODIUM CHLORIDE 9 MG/ML
1000 INJECTION INTRAMUSCULAR; INTRAVENOUS; SUBCUTANEOUS ONCE
Refills: 0 | Status: COMPLETED | OUTPATIENT
Start: 2021-11-01 | End: 2021-11-01

## 2021-11-01 RX ORDER — THIAMINE MONONITRATE (VIT B1) 100 MG
100 TABLET ORAL ONCE
Refills: 0 | Status: COMPLETED | OUTPATIENT
Start: 2021-11-01 | End: 2021-11-01

## 2021-11-01 RX ADMIN — Medication 50 MILLIGRAM(S): at 16:18

## 2021-11-01 RX ADMIN — Medication 2 MILLIGRAM(S): at 23:38

## 2021-11-01 RX ADMIN — MORPHINE SULFATE 4 MILLIGRAM(S): 50 CAPSULE, EXTENDED RELEASE ORAL at 15:56

## 2021-11-01 RX ADMIN — ONDANSETRON 4 MILLIGRAM(S): 8 TABLET, FILM COATED ORAL at 15:54

## 2021-11-01 RX ADMIN — SODIUM CHLORIDE 1000 MILLILITER(S): 9 INJECTION INTRAMUSCULAR; INTRAVENOUS; SUBCUTANEOUS at 15:53

## 2021-11-01 RX ADMIN — Medication 2 MILLIGRAM(S): at 15:54

## 2021-11-01 RX ADMIN — MORPHINE SULFATE 4 MILLIGRAM(S): 50 CAPSULE, EXTENDED RELEASE ORAL at 16:11

## 2021-11-01 RX ADMIN — SODIUM CHLORIDE 1000 MILLILITER(S): 9 INJECTION INTRAMUSCULAR; INTRAVENOUS; SUBCUTANEOUS at 17:45

## 2021-11-01 RX ADMIN — Medication 100 MILLIGRAM(S): at 15:54

## 2021-11-01 NOTE — ED PROVIDER NOTE - PATIENT PORTAL LINK FT
You can access the FollowMyHealth Patient Portal offered by Health system by registering at the following website: [email or web address]. By joining [employer]’s FollowMyHealth portal, you will also be able to view your health information using other applications (apps) compatible with our system.

## 2021-11-01 NOTE — ED ADULT TRIAGE NOTE - CHIEF COMPLAINT QUOTE
Pt states having PTSD over witnessing a suicide and state started drinking alcohol 4 months ago and binging with daily Klonopin use. Pt states she stopped cold turkey but did have 3 ounces of alcohol this morning. Pt noted to be anxious in triage.

## 2021-11-01 NOTE — ED PROVIDER NOTE - ATTENDING CONTRIBUTION TO CARE
The patient seen and examined    Abdominal Pain    I, Jt Bryant, performed the initial face to face bedside interview with this patient regarding history of present illness, review of symptoms and relevant past medical, social and family history.  I completed an independent physical examination.  I was the initial provider who evaluated this patient. I have signed out the follow up of any pending tests (i.e. labs, radiological studies) to the ACP.  I have communicated the patient’s plan of care and disposition with the ACP.

## 2021-11-01 NOTE — ED PROVIDER NOTE - CROS ED MARK PERT SYS NEG
Metronidazole Pregnancy And Lactation Text: This medication is Pregnancy Category B and considered safe during pregnancy. It is also excreted in breast milk. Tetracycline Counseling: Patient counseled regarding possible photosensitivity and increased risk for sunburn. Patient instructed to avoid sunlight, if possible. When exposed to sunlight, patients should wear protective clothing, sunglasses, and sunscreen. The patient was instructed to call the office immediately if the following severe adverse effects occur:  hearing changes, easy bruising/bleeding, severe headache, or vision changes. The patient verbalized understanding of the proper use and possible adverse effects of tetracycline. All of the patient's questions and concerns were addressed. Patient understands to avoid pregnancy while on therapy due to potential birth defects. Rituxan Counseling:  I discussed with the patient the risks of Rituxan infusions. Side effects can include infusion reactions, severe drug rashes including mucocutaneous reactions, reactivation of latent hepatitis and other infections and rarely progressive multifocal leukoencephalopathy. All of the patient's questions and concerns were addressed. Glycopyrrolate Counseling:  I discussed with the patient the risks of glycopyrrolate including but not limited to skin rash, drowsiness, dry mouth, difficulty urinating, and blurred vision. Arava Counseling:  Patient counseled regarding adverse effects of Arava including but not limited to nausea, vomiting, abnormalities in liver function tests. Patients may develop mouth sores, rash, diarrhea, and abnormalities in blood counts. The patient understands that monitoring is required including LFTs and blood counts. There is a rare possibility of scarring of the liver and lung problems that can occur when taking methotrexate. Persistent nausea, loss of appetite, pale stools, dark urine, cough, and shortness of breath should be reported immediately. Patient advised to discontinue Arava treatment and consult with a physician prior to attempting conception. The patient will have to undergo a treatment to eliminate Arava from the body prior to conception. Solaraze Pregnancy And Lactation Text: This medication is Pregnancy Category B and is considered safe. There is some data to suggest avoiding during the third trimester. It is unknown if this medication is excreted in breast milk. Carac Pregnancy And Lactation Text: This medication is Pregnancy Category X and contraindicated in pregnancy and in women who may become pregnant. It is unknown if this medication is excreted in breast milk. Cosentyx Counseling:  I discussed with the patient the risks of Cosentyx including but not limited to worsening of Crohn's disease, immunosuppression, allergic reactions and infections. The patient understands that monitoring is required including a PPD at baseline and must alert us or the primary physician if symptoms of infection or other concerning signs are noted. Cimzia Counseling:  I discussed with the patient the risks of Cimzia including but not limited to immunosuppression, allergic reactions and infections. The patient understands that monitoring is required including a PPD at baseline and must alert us or the primary physician if symptoms of infection or other concerning signs are noted. Carac Counseling:  I discussed with the patient the risks of Carac including but not limited to erythema, scaling, itching, weeping, crusting, and pain. Hydroquinone Pregnancy And Lactation Text: This medication has not been assigned a Pregnancy Risk Category but animal studies failed to show danger with the topical medication. It is unknown if the medication is excreted in breast milk. Cyclophosphamide Counseling:  I discussed with the patient the risks of cyclophosphamide including but not limited to hair loss, hormonal abnormalities, decreased fertility, abdominal pain, diarrhea, nausea and vomiting, bone marrow suppression and infection. The patient understands that monitoring is required while taking this medication. Methotrexate Pregnancy And Lactation Text: This medication is Pregnancy Category X and is known to cause fetal harm. This medication is excreted in breast milk. Dupixent Counseling: I discussed with the patient the risks of dupilumab including but not limited to eye infection and irritation, cold sores, injection site reactions, worsening of asthma, allergic reactions and increased risk of parasitic infection. Live vaccines should be avoided while taking dupilumab. Dupilumab will also interact with certain medications such as warfarin and cyclosporine. The patient understands that monitoring is required and they must alert us or the primary physician if symptoms of infection or other concerning signs are noted. Albendazole Counseling:  I discussed with the patient the risks of albendazole including but not limited to cytopenia, kidney damage, nausea/vomiting and severe allergy. The patient understands that this medication is being used in an off-label manner. Topical Sulfur Applications Pregnancy And Lactation Text: This medication is Pregnancy Category C and has an unknown safety profile during pregnancy. It is unknown if this topical medication is excreted in breast milk. Ilumya Pregnancy And Lactation Text: The risk during pregnancy and breastfeeding is uncertain with this medication. Hydroxyzine Pregnancy And Lactation Text: This medication is not safe during pregnancy and should not be taken. It is also excreted in breast milk and breast feeding isn't recommended. Azathioprine Counseling:  I discussed with the patient the risks of azathioprine including but not limited to myelosuppression, immunosuppression, hepatotoxicity, lymphoma, and infections. The patient understands that monitoring is required including baseline LFTs, Creatinine, possible TPMP genotyping and weekly CBCs for the first month and then every 2 weeks thereafter. The patient verbalized understanding of the proper use and possible adverse effects of azathioprine. All of the patient's questions and concerns were addressed. Cephalexin Pregnancy And Lactation Text: This medication is Pregnancy Category B and considered safe during pregnancy. It is also excreted in breast milk but can be used safely for shorter doses. Colchicine Counseling:  Patient counseled regarding adverse effects including but not limited to stomach upset (nausea, vomiting, stomach pain, or diarrhea). Patient instructed to limit alcohol consumption while taking this medication. Colchicine may reduce blood counts especially with prolonged use. The patient understands that monitoring of kidney function and blood counts may be required, especially at baseline. The patient verbalized understanding of the proper use and possible adverse effects of colchicine. All of the patient's questions and concerns were addressed. Thalidomide Pregnancy And Lactation Text: This medication is Pregnancy Category X and is absolutely contraindicated during pregnancy. It is unknown if it is excreted in breast milk. Eucrisa Counseling: Patient may experience a mild burning sensation during topical application. David Coe is not approved in children less than 3years of age. Spironolactone Pregnancy And Lactation Text: This medication can cause feminization of the male fetus and should be avoided during pregnancy. The active metabolite is also found in breast milk. Ketoconazole Pregnancy And Lactation Text: This medication is Pregnancy Category C and it isn't know if it is safe during pregnancy. It is also excreted in breast milk and breast feeding isn't recommended. Cellcept Counseling:  I discussed with the patient the risks of mycophenolate mofetil including but not limited to infection/immunosuppression, GI upset, hypokalemia, hypercholesterolemia, bone marrow suppression, lymphoproliferative disorders, malignancy, GI ulceration/bleed/perforation, colitis, interstitial lung disease, kidney failure, progressive multifocal leukoencephalopathy, and birth defects. The patient understands that monitoring is required including a baseline creatinine and regular CBC testing. In addition, patient must alert us immediately if symptoms of infection or other concerning signs are noted. Zyclara Pregnancy And Lactation Text: This medication is Pregnancy Category C. It is unknown if this medication is excreted in breast milk. Azathioprine Pregnancy And Lactation Text: This medication is Pregnancy Category D and isn't considered safe during pregnancy. It is unknown if this medication is excreted in breast milk. Clofazimine Counseling:  I discussed with the patient the risks of clofazimine including but not limited to skin and eye pigmentation, liver damage, nausea/vomiting, gastrointestinal bleeding and allergy. Turner Counseling: Drew Bending Counseling: I discussed with the patient the risks of Drew Bending therapy including increased risk of infection, liver issues, headache, diarrhea, or cold symptoms. Live vaccines should be avoided. They were instructed to call if they have any problems. Isotretinoin Pregnancy And Lactation Text: This medication is Pregnancy Category X and is considered extremely dangerous during pregnancy. It is unknown if it is excreted in breast milk. Use Enhanced Medication Counseling?: No Skyrizi Counseling: I discussed with the patient the risks of risankizumab-rzaa including but not limited to immunosuppression, and serious infections. The patient understands that monitoring is required including a PPD at baseline and must alert us or the primary physician if symptoms of infection or other concerning signs are noted. Prednisone Counseling:  I discussed with the patient the risks of prolonged use of prednisone including but not limited to weight gain, insomnia, osteoporosis, mood changes, diabetes, susceptibility to infection, glaucoma and high blood pressure. In cases where prednisone use is prolonged, patients should be monitored with blood pressure checks, serum glucose levels and an eye exam.  Additionally, the patient may need to be placed on GI prophylaxis, PCP prophylaxis, and calcium and vitamin D supplementation and/or a bisphosphonate. The patient verbalized understanding of the proper use and the possible adverse effects of prednisone. All of the patient's questions and concerns were addressed. Erythromycin Pregnancy And Lactation Text: This medication is Pregnancy Category B and is considered safe during pregnancy. It is also excreted in breast milk. Elidel Counseling: Patient may experience a mild burning sensation during topical application. Elidel is not approved in children less than 3years of age. There have been case reports of hematologic and skin malignancies in patients using topical calcineurin inhibitors although causality is questionable. Xolair Counseling:  Patient informed of potential adverse effects including but not limited to fever, muscle aches, rash and allergic reactions. The patient verbalized understanding of the proper use and possible adverse effects of Xolair. All of the patient's questions and concerns were addressed. Prednisone Pregnancy And Lactation Text: This medication is Pregnancy Category C and it isn't know if it is safe during pregnancy. This medication is excreted in breast milk. Azithromycin Counseling:  I discussed with the patient the risks of azithromycin including but not limited to GI upset, allergic reaction, drug rash, diarrhea, and yeast infections. Griseofulvin Pregnancy And Lactation Text: This medication is Pregnancy Category X and is known to cause serious birth defects. It is unknown if this medication is excreted in breast milk but breast feeding should be avoided. Azithromycin Pregnancy And Lactation Text: This medication is considered safe during pregnancy and is also secreted in breast milk. Picato Counseling:  I discussed with the patient the risks of Picato including but not limited to erythema, scaling, itching, weeping, crusting, and pain. Colchicine Pregnancy And Lactation Text: This medication is Pregnancy Category C and isn't considered safe during pregnancy. It is excreted in breast milk. Tazorac Pregnancy And Lactation Text: This medication is not safe during pregnancy. It is unknown if this medication is excreted in breast milk. Topical Retinoid counseling:  Patient advised to apply a pea-sized amount only at bedtime and wait 30 minutes after washing their face before applying. If too drying, patient may add a non-comedogenic moisturizer. The patient verbalized understanding of the proper use and possible adverse effects of retinoids. All of the patient's questions and concerns were addressed. Doxycycline Pregnancy And Lactation Text: This medication is Pregnancy Category D and not consider safe during pregnancy. It is also excreted in breast milk but is considered safe for shorter treatment courses. Erivedge Counseling- I discussed with the patient the risks of Erivedge including but not limited to nausea, vomiting, diarrhea, constipation, weight loss, changes in the sense of taste, decreased appetite, muscle spasms, and hair loss. The patient verbalized understanding of the proper use and possible adverse effects of Erivedge. All of the patient's questions and concerns were addressed. Minocycline Counseling: Patient advised regarding possible photosensitivity and discoloration of the teeth, skin, lips, tongue and gums. Patient instructed to avoid sunlight, if possible. When exposed to sunlight, patients should wear protective clothing, sunglasses, and sunscreen. The patient was instructed to call the office immediately if the following severe adverse effects occur:  hearing changes, easy bruising/bleeding, severe headache, or vision changes. The patient verbalized understanding of the proper use and possible adverse effects of minocycline. All of the patient's questions and concerns were addressed. Infliximab Counseling:  I discussed with the patient the risks of infliximab including but not limited to myelosuppression, immunosuppression, autoimmune hepatitis, demyelinating diseases, lymphoma, and serious infections. The patient understands that monitoring is required including a PPD at baseline and must alert us or the primary physician if symptoms of infection or other concerning signs are noted. Siliq Counseling:  I discussed with the patient the risks of Siliq including but not limited to new or worsening depression, suicidal thoughts and behavior, immunosuppression, malignancy, posterior leukoencephalopathy syndrome, and serious infections. The patient understands that monitoring is required including a PPD at baseline and must alert us or the primary physician if symptoms of infection or other concerning signs are noted. There is also a special program designed to monitor depression which is required with Siliq. Cosentyx Pregnancy And Lactation Text: This medication is Pregnancy Category B and is considered safe during pregnancy. It is unknown if this medication is excreted in breast milk. Methotrexate Counseling:  Patient counseled regarding adverse effects of methotrexate including but not limited to nausea, vomiting, abnormalities in liver function tests. Patients may develop mouth sores, rash, diarrhea, and abnormalities in blood counts. The patient understands that monitoring is required including LFT's and blood counts. There is a rare possibility of scarring of the liver and lung problems that can occur when taking methotrexate. Persistent nausea, loss of appetite, pale stools, dark urine, cough, and shortness of breath should be reported immediately. Patient advised to discontinue methotrexate treatment at least three months before attempting to become pregnant. I discussed the need for folate supplements while taking methotrexate. These supplements can decrease side effects during methotrexate treatment. The patient verbalized understanding of the proper use and possible adverse effects of methotrexate. All of the patient's questions and concerns were addressed. Drysol Pregnancy And Lactation Text: This medication is considered safe during pregnancy and breast feeding. Doxepin Counseling:  Patient advised that the medication is sedating and not to drive a car after taking this medication. Patient informed of potential adverse effects including but not limited to dry mouth, urinary retention, and blurry vision. The patient verbalized understanding of the proper use and possible adverse effects of doxepin. All of the patient's questions and concerns were addressed. Minocycline Pregnancy And Lactation Text: This medication is Pregnancy Category D and not consider safe during pregnancy. It is also excreted in breast milk. Oxybutynin Counseling:  I discussed with the patient the risks of oxybutynin including but not limited to skin rash, drowsiness, dry mouth, difficulty urinating, and blurred vision. 5-Fu Counseling: 5-Fluorouracil Counseling:  I discussed with the patient the risks of 5-fluorouracil including but not limited to erythema, scaling, itching, weeping, crusting, and pain. Ivermectin Pregnancy And Lactation Text: This medication is Pregnancy Category C and it isn't known if it is safe during pregnancy. It is also excreted in breast milk. Hydroxyzine Counseling: Patient advised that the medication is sedating and not to drive a car after taking this medication. Patient informed of potential adverse effects including but not limited to dry mouth, urinary retention, and blurry vision. The patient verbalized understanding of the proper use and possible adverse effects of hydroxyzine. All of the patient's questions and concerns were addressed. Topical Sulfur Applications Counseling: Topical Sulfur Counseling: Patient counseled that this medication may cause skin irritation or allergic reactions. In the event of skin irritation, the patient was advised to reduce the amount of the drug applied or use it less frequently. The patient verbalized understanding of the proper use and possible adverse effects of topical sulfur application. All of the patient's questions and concerns were addressed. Bexarotene Pregnancy And Lactation Text: This medication is Pregnancy Category X and should not be given to women who are pregnant or may become pregnant. This medication should not be used if you are breast feeding. Acitretin Pregnancy And Lactation Text: This medication is Pregnancy Category X and should not be given to women who are pregnant or may become pregnant in the future. This medication is excreted in breast milk. Sski Pregnancy And Lactation Text: This medication is Pregnancy Category D and isn't considered safe during pregnancy. It is excreted in breast milk. Topical Clindamycin Counseling: Patient counseled that this medication may cause skin irritation or allergic reactions. In the event of skin irritation, the patient was advised to reduce the amount of the drug applied or use it less frequently. The patient verbalized understanding of the proper use and possible adverse effects of clindamycin. All of the patient's questions and concerns were addressed. Minoxidil Counseling: Minoxidil is a topical medication which can increase blood flow where it is applied. It is uncertain how this medication increases hair growth. Side effects are uncommon and include stinging and allergic reactions. Otezla Counseling: Parth [place] Counseling: The side effects of Parth [place] were discussed with the patient, including but not limited to worsening or new depression, weight loss, diarrhea, nausea, upper respiratory tract infection, and headache. Patient instructed to call the office should any adverse effect occur. The patient verbalized understanding of the proper use and possible adverse effects of Otezla. All the patient's questions and concerns were addressed. Quinolones Pregnancy And Lactation Text: This medication is Pregnancy Category C and it isn't know if it is safe during pregnancy. It is also excreted in breast milk. Isotretinoin Counseling: Patient should get monthly blood tests, not donate blood, not drive at night if vision affected, not share medication, and not undergo elective surgery for 6 months after tx completed. Side effects reviewed, pt to contact office should one occur. danyell all pertinent systems negative Thalidomide Counseling: I discussed with the patient the risks of thalidomide including but not limited to birth defects, anxiety, weakness, chest pain, dizziness, cough and severe allergy. Dupixent Pregnancy And Lactation Text: This medication likely crosses the placenta but the risk for the fetus is uncertain. This medication is excreted in breast milk. Detail Level: Detailed Sarecycline Counseling: Patient advised regarding possible photosensitivity and discoloration of the teeth, skin, lips, tongue and gums. Patient instructed to avoid sunlight, if possible. When exposed to sunlight, patients should wear protective clothing, sunglasses, and sunscreen. The patient was instructed to call the office immediately if the following severe adverse effects occur:  hearing changes, easy bruising/bleeding, severe headache, or vision changes. The patient verbalized understanding of the proper use and possible adverse effects of sarecycline. All of the patient's questions and concerns were addressed. Dapsone Counseling: I discussed with the patient the risks of dapsone including but not limited to hemolytic anemia, agranulocytosis, rashes, methemoglobinemia, kidney failure, peripheral neuropathy, headaches, GI upset, and liver toxicity. Patients who start dapsone require monitoring including baseline LFTs and weekly CBCs for the first month, then every month thereafter. The patient verbalized understanding of the proper use and possible adverse effects of dapsone. All of the patient's questions and concerns were addressed. Benzoyl Peroxide Pregnancy And Lactation Text: This medication is Pregnancy Category C. It is unknown if benzoyl peroxide is excreted in breast milk. Rifampin Pregnancy And Lactation Text: This medication is Pregnancy Category C and it isn't know if it is safe during pregnancy. It is also excreted in breast milk and should not be used if you are breast feeding. Odomzo Counseling- I discussed with the patient the risks of Odomzo including but not limited to nausea, vomiting, diarrhea, constipation, weight loss, changes in the sense of taste, decreased appetite, muscle spasms, and hair loss. The patient verbalized understanding of the proper use and possible adverse effects of Odomzo. All of the patient's questions and concerns were addressed. SSKI Counseling:  I discussed with the patient the risks of SSKI including but not limited to thyroid abnormalities, metallic taste, GI upset, fever, headache, acne, arthralgias, paraesthesias, lymphadenopathy, easy bleeding, arrhythmias, and allergic reaction. Hydroxychloroquine Pregnancy And Lactation Text: This medication has been shown to cause fetal harm but it isn't assigned a Pregnancy Risk Category. There are small amounts excreted in breast milk. Hydroxychloroquine Counseling:  I discussed with the patient that a baseline ophthalmologic exam is needed at the start of therapy and every year thereafter while on therapy. A CBC may also be warranted for monitoring. The side effects of this medication were discussed with the patient, including but not limited to agranulocytosis, aplastic anemia, seizures, rashes, retinopathy, and liver toxicity. Patient instructed to call the office should any adverse effect occur. The patient verbalized understanding of the proper use and possible adverse effects of Plaquenil. All the patient's questions and concerns were addressed. Cephalexin Counseling: I counseled the patient regarding use of cephalexin as an antibiotic for prophylactic and/or therapeutic purposes. Cephalexin (commonly prescribed under brand name Keflex) is a cephalosporin antibiotic which is active against numerous classes of bacteria, including most skin bacteria. Side effects may include nausea, diarrhea, gastrointestinal upset, rash, hives, yeast infections, and in rare cases, hepatitis, kidney disease, seizures, fever, confusion, neurologic symptoms, and others. Patients with severe allergies to penicillin medications are cautioned that there is about a 10% incidence of cross-reactivity with cephalosporins. When possible, patients with penicillin allergies should use alternatives to cephalosporins for antibiotic therapy. Tremfya Counseling: I discussed with the patient the risks of guselkumab including but not limited to immunosuppression, serious infections, worsening of inflammatory bowel disease and drug reactions. The patient understands that monitoring is required including a PPD at baseline and must alert us or the primary physician if symptoms of infection or other concerning signs are noted. Topical Clindamycin Pregnancy And Lactation Text: This medication is Pregnancy Category B and is considered safe during pregnancy. It is unknown if it is excreted in breast milk. Clindamycin Counseling: I counseled the patient regarding use of clindamycin as an antibiotic for prophylactic and/or therapeutic purposes. Clindamycin is active against numerous classes of bacteria, including skin bacteria. Side effects may include nausea, diarrhea, gastrointestinal upset, rash, hives, yeast infections, and in rare cases, colitis. Imiquimod Counseling:  I discussed with the patient the risks of imiquimod including but not limited to erythema, scaling, itching, weeping, crusting, and pain. Patient understands that the inflammatory response to imiquimod is variable from person to person and was educated regarded proper titration schedule. If flu-like symptoms develop, patient knows to discontinue the medication and contact us. Nsaids Counseling: NSAID Counseling: I discussed with the patient that NSAIDs should be taken with food. Prolonged use of NSAIDs can result in the development of stomach ulcers. Patient advised to stop taking NSAIDs if abdominal pain occurs. The patient verbalized understanding of the proper use and possible adverse effects of NSAIDs. All of the patient's questions and concerns were addressed. Benzoyl Peroxide Counseling: Patient counseled that medicine may cause skin irritation and bleach clothing. In the event of skin irritation, the patient was advised to reduce the amount of the drug applied or use it less frequently. The patient verbalized understanding of the proper use and possible adverse effects of benzoyl peroxide. All of the patient's questions and concerns were addressed. Terbinafine Pregnancy And Lactation Text: This medication is Pregnancy Category B and is considered safe during pregnancy. It is also excreted in breast milk and breast feeding isn't recommended. Birth Control Pills Counseling: Birth Control Pill Counseling: I discussed with the patient the potential side effects of OCPs including but not limited to increased risk of stroke, heart attack, thrombophlebitis, deep venous thrombosis, hepatic adenomas, breast changes, GI upset, headaches, and depression. The patient verbalized understanding of the proper use and possible adverse effects of OCPs. All of the patient's questions and concerns were addressed. Zyclara Counseling:  I discussed with the patient the risks of imiquimod including but not limited to erythema, scaling, itching, weeping, crusting, and pain. Patient understands that the inflammatory response to imiquimod is variable from person to person and was educated regarded proper titration schedule. If flu-like symptoms develop, patient knows to discontinue the medication and contact us. Stelara Counseling:  I discussed with the patient the risks of ustekinumab including but not limited to immunosuppression, malignancy, posterior leukoencephalopathy syndrome, and serious infections. The patient understands that monitoring is required including a PPD at baseline and must alert us or the primary physician if symptoms of infection or other concerning signs are noted. Glycopyrrolate Pregnancy And Lactation Text: This medication is Pregnancy Category B and is considered safe during pregnancy. It is unknown if it is excreted breast milk. Protopic Counseling: Patient may experience a mild burning sensation during topical application. Protopic is not approved in children less than 3years of age. There have been case reports of hematologic and skin malignancies in patients using topical calcineurin inhibitors although causality is questionable. Dapsone Pregnancy And Lactation Text: This medication is Pregnancy Category C and is not considered safe during pregnancy or breast feeding. Spironolactone Counseling: Patient advised regarding risks of diarrhea, abdominal pain, hyperkalemia, birth defects (for female patients), liver toxicity and renal toxicity. The patient may need blood work to monitor liver and kidney function and potassium levels while on therapy. The patient verbalized understanding of the proper use and possible adverse effects of spironolactone. All of the patient's questions and concerns were addressed. Ivermectin Counseling:  Patient instructed to take medication on an empty stomach with a full glass of water. Patient informed of potential adverse effects including but not limited to nausea, diarrhea, dizziness, itching, and swelling of the extremities or lymph nodes. The patient verbalized understanding of the proper use and possible adverse effects of ivermectin. All of the patient's questions and concerns were addressed. Protopic Pregnancy And Lactation Text: This medication is Pregnancy Category C. It is unknown if this medication is excreted in breast milk when applied topically. Enbrel Counseling:  I discussed with the patient the risks of etanercept including but not limited to myelosuppression, immunosuppression, autoimmune hepatitis, demyelinating diseases, lymphoma, and infections. The patient understands that monitoring is required including a PPD at baseline and must alert us or the primary physician if symptoms of infection or other concerning signs are noted. Rifampin Counseling: I discussed with the patient the risks of rifampin including but not limited to liver damage, kidney damage, red-orange body fluids, nausea/vomiting and severe allergy. Erythromycin Counseling:  I discussed with the patient the risks of erythromycin including but not limited to GI upset, allergic reaction, drug rash, diarrhea, increase in liver enzymes, and yeast infections. Humira Counseling:  I discussed with the patient the risks of adalimumab including but not limited to myelosuppression, immunosuppression, autoimmune hepatitis, demyelinating diseases, lymphoma, and serious infections. The patient understands that monitoring is required including a PPD at baseline and must alert us or the primary physician if symptoms of infection or other concerning signs are noted. Gabapentin Counseling: I discussed with the patient the risks of gabapentin including but not limited to dizziness, somnolence, fatigue and ataxia. High Dose Vitamin A Pregnancy And Lactation Text: High dose vitamin A therapy is contraindicated during pregnancy and breast feeding. Tazorac Counseling:  Patient advised that medication is irritating and drying. Patient may need to apply sparingly and wash off after an hour before eventually leaving it on overnight. The patient verbalized understanding of the proper use and possible adverse effects of tazorac. All of the patient's questions and concerns were addressed. Quinolones Counseling:  I discussed with the patient the risks of fluoroquinolones including but not limited to GI upset, allergic reaction, drug rash, diarrhea, dizziness, photosensitivity, yeast infections, liver function test abnormalities, tendonitis/tendon rupture. Xelmalikz Pregnancy And Lactation Text: This medication is Pregnancy Category D and is not considered safe during pregnancy. The risk during breast feeding is also uncertain. Metronidazole Counseling:  I discussed with the patient the risks of metronidazole including but not limited to seizures, nausea/vomiting, a metallic taste in the mouth, nausea/vomiting and severe allergy. Cimetidine Counseling:  I discussed with the patient the risks of Cimetidine including but not limited to gynecomastia, headache, diarrhea, nausea, drowsiness, arrhythmias, pancreatitis, skin rashes, psychosis, bone marrow suppression and kidney toxicity. Otezla Pregnancy And Lactation Text: This medication is Pregnancy Category C and it isn't known if it is safe during pregnancy. It is unknown if it is excreted in breast milk. Solaraze Counseling:  I discussed with the patient the risks of Solaraze including but not limited to erythema, scaling, itching, weeping, crusting, and pain. Terbinafine Counseling: Patient counseling regarding adverse effects of terbinafine including but not limited to headache, diarrhea, rash, upset stomach, liver function test abnormalities, itching, taste/smell disturbance, nausea, abdominal pain, and flatulence. There is a rare possibility of liver failure that can occur when taking terbinafine. The patient understands that a baseline LFT and kidney function test may be required. The patient verbalized understanding of the proper use and possible adverse effects of terbinafine. All of the patient's questions and concerns were addressed. Xolair Pregnancy And Lactation Text: This medication is Pregnancy Category B and is considered safe during pregnancy. This medication is excreted in breast milk. Griseofulvin Counseling:  I discussed with the patient the risks of griseofulvin including but not limited to photosensitivity, cytopenia, liver damage, nausea/vomiting and severe allergy. The patient understands that this medication is best absorbed when taken with a fatty meal (e.g., ice cream or french fries). Itraconazole Counseling:  I discussed with the patient the risks of itraconazole including but not limited to liver damage, nausea/vomiting, neuropathy, and severe allergy. The patient understands that this medication is best absorbed when taken with acidic beverages such as non-diet cola or ginger ale. The patient understands that monitoring is required including baseline LFTs and repeat LFTs at intervals. The patient understands that they are to contact us or the primary physician if concerning signs are noted. Birth Control Pills Pregnancy And Lactation Text: This medication should be avoided if pregnant and for the first 30 days post-partum. Cimzia Pregnancy And Lactation Text: This medication crosses the placenta but can be considered safe in certain situations. Cimzia may be excreted in breast milk. Fluconazole Counseling:  Patient counseled regarding adverse effects of fluconazole including but not limited to headache, diarrhea, nausea, upset stomach, liver function test abnormalities, taste disturbance, and stomach pain. There is a rare possibility of liver failure that can occur when taking fluconazole. The patient understands that monitoring of LFTs and kidney function test may be required, especially at baseline. The patient verbalized understanding of the proper use and possible adverse effects of fluconazole. All of the patient's questions and concerns were addressed. High Dose Vitamin A Counseling: Side effects reviewed, pt to contact office should one occur. Nsaids Pregnancy And Lactation Text: These medications are considered safe up to 30 weeks gestation. It is excreted in breast milk. Ketoconazole Counseling:   Patient counseled regarding improving absorption with orange juice. Adverse effects include but are not limited to breast enlargement, headache, diarrhea, nausea, upset stomach, liver function test abnormalities, taste disturbance, and stomach pain. There is a rare possibility of liver failure that can occur when taking ketoconazole. The patient understands that monitoring of LFTs may be required, especially at baseline. The patient verbalized understanding of the proper use and possible adverse effects of ketoconazole. All of the patient's questions and concerns were addressed. Drysol Counseling:  I discussed with the patient the risks of drysol/aluminum chloride including but not limited to skin rash, itching, irritation, burning. Bactrim Counseling:  I discussed with the patient the risks of sulfa antibiotics including but not limited to GI upset, allergic reaction, drug rash, diarrhea, dizziness, photosensitivity, and yeast infections. Rarely, more serious reactions can occur including but not limited to aplastic anemia, agranulocytosis, methemoglobinemia, blood dyscrasias, liver or kidney failure, lung infiltrates or desquamative/blistering drug rashes. Rituxan Pregnancy And Lactation Text: This medication is Pregnancy Category C and it isn't know if it is safe during pregnancy. It is unknown if this medication is excreted in breast milk but similar antibodies are known to be excreted. Cyclophosphamide Pregnancy And Lactation Text: This medication is Pregnancy Category D and it isn't considered safe during pregnancy. This medication is excreted in breast milk. Doxepin Pregnancy And Lactation Text: This medication is Pregnancy Category C and it isn't known if it is safe during pregnancy. It is also excreted in breast milk and breast feeding isn't recommended. Cyclosporine Counseling:  I discussed with the patient the risks of cyclosporine including but not limited to hypertension, gingival hyperplasia,myelosuppression, immunosuppression, liver damage, kidney damage, neurotoxicity, lymphoma, and serious infections. The patient understands that monitoring is required including baseline blood pressure, CBC, CMP, lipid panel and uric acid, and then 1-2 times monthly CMP and blood pressure. Valtrex Pregnancy And Lactation Text: this medication is Pregnancy Category B and is considered safe during pregnancy. This medication is not directly found in breast milk but it's metabolite acyclovir is present. Doxycycline Counseling:  Patient counseled regarding possible photosensitivity and increased risk for sunburn. Patient instructed to avoid sunlight, if possible. When exposed to sunlight, patients should wear protective clothing, sunglasses, and sunscreen. The patient was instructed to call the office immediately if the following severe adverse effects occur:  hearing changes, easy bruising/bleeding, severe headache, or vision changes. The patient verbalized understanding of the proper use and possible adverse effects of doxycycline. All of the patient's questions and concerns were addressed. Simponi Counseling:  I discussed with the patient the risks of golimumab including but not limited to myelosuppression, immunosuppression, autoimmune hepatitis, demyelinating diseases, lymphoma, and serious infections. The patient understands that monitoring is required including a PPD at baseline and must alert us or the primary physician if symptoms of infection or other concerning signs are noted. Taltz Counseling: I discussed with the patient the risks of ixekizumab including but not limited to immunosuppression, serious infections, worsening of inflammatory bowel disease and drug reactions. The patient understands that monitoring is required including a PPD at baseline and must alert us or the primary physician if symptoms of infection or other concerning signs are noted. Bexarotene Counseling:  I discussed with the patient the risks of bexarotene including but not limited to hair loss, dry lips/skin/eyes, liver abnormalities, hyperlipidemia, pancreatitis, depression/suicidal ideation, photosensitivity, drug rash/allergic reactions, hypothyroidism, anemia, leukopenia, infection, cataracts, and teratogenicity. Patient understands that they will need regular blood tests to check lipid profile, liver function tests, white blood cell count, thyroid function tests and pregnancy test if applicable. Ilumya Counseling: I discussed with the patient the risks of tildrakizumab including but not limited to immunosuppression, malignancy, posterior leukoencephalopathy syndrome, and serious infections. The patient understands that monitoring is required including a PPD at baseline and must alert us or the primary physician if symptoms of infection or other concerning signs are noted. Clindamycin Pregnancy And Lactation Text: This medication can be used in pregnancy if certain situations. Clindamycin is also present in breast milk. Acitretin Counseling:  I discussed with the patient the risks of acitretin including but not limited to hair loss, dry lips/skin/eyes, liver damage, hyperlipidemia, depression/suicidal ideation, photosensitivity. Serious rare side effects can include but are not limited to pancreatitis, pseudotumor cerebri, bony changes, clot formation/stroke/heart attack. Patient understands that alcohol is contraindicated since it can result in liver toxicity and significantly prolong the elimination of the drug by many years. Bactrim Pregnancy And Lactation Text: This medication is Pregnancy Category D and is known to cause fetal risk. It is also excreted in breast milk. Valtrex Counseling: I discussed with the patient the risks of valacyclovir including but not limited to kidney damage, nausea, vomiting and severe allergy. The patient understands that if the infection seems to be worsening or is not improving, they are to call. Hydroquinone Counseling:  Patient advised that medication may result in skin irritation, lightening (hypopigmentation), dryness, and burning. In the event of skin irritation, the patient was advised to reduce the amount of the drug applied or use it less frequently. Rarely, spots that are treated with hydroquinone can become darker (pseudoochronosis). Should this occur, patient instructed to stop medication and call the office. The patient verbalized understanding of the proper use and possible adverse effects of hydroquinone. All of the patient's questions and concerns were addressed.

## 2021-11-01 NOTE — ED ADULT NURSE NOTE - PRO INTERPRETER NEED 2
Cardiac rehab education completed with patient  Patient referred to cardiac rehab  Cardiac rehab appt made
English

## 2021-11-01 NOTE — ED ADULT NURSE NOTE - NSICDXPASTSURGICALHX_GEN_ALL_CORE_FT
PAST SURGICAL HISTORY:  H/O ovarian cystectomy     No significant past surgical history     S/P      S/P tonsillectomy

## 2021-11-01 NOTE — SBIRT NOTE ADULT - NSSBIRTUNABLESCROTHER_GEN_A_CORE
Patient initially resistant to completing the SBIRT FULL SCREEN; ED MD at bedside, patient asked health  to return to finish our conversation.

## 2021-11-01 NOTE — ED PROVIDER NOTE - OBJECTIVE STATEMENT
The patient is a 56 year old female presents with tremors and midepigastric pain today last drink this morning.  The patient is a chronic alcoholic drinks 2-3 large elen/day  Depressed but no SI  The patient wants to stop drinking

## 2021-11-01 NOTE — ED PROVIDER NOTE - PROGRESS NOTE DETAILS
CT results as noted.  Pt sleeping in NAD after receiving Ativan 2 mg just after 2300.  Informed of results of CT.  Still c/o epigastric abd pain.  IV Protonix and Carafate ordered.  Will re-eval for possible d/c vs eval by SBIRT in the AM MD Dale: pt complains of nausea and was given 1 dose of ativan. pt has soft and nontender abdomen, no actionable abnormalities on blood work or CT. vitals wnl. pt requests outpt librium taper. pt was evaluated by SBIRT. will discharge patient home at this time. discussed return precautions and need for outpatient follow up.

## 2021-11-01 NOTE — ED ADULT NURSE NOTE - NSICDXPASTMEDICALHX_GEN_ALL_CORE_FT
PAST MEDICAL HISTORY:  Alcohol abuse     Anxiety     ETOH abuse     Hypertension     No pertinent past medical history     PTSD (post-traumatic stress disorder)     Subdural hematoma

## 2021-11-01 NOTE — ED PROVIDER NOTE - NSFOLLOWUPINSTRUCTIONS_ED_ALL_ED_FT
You were seen in the emergency room for abdominal pain.    Please follow up with your primary care provider in the next few days.    1 tab of librium every 8 hours for 1 day   1 tab of librium every 12 hours x 1 day   1 tab of librium x 1 day.    Return to the emergency room if you have any of the following symptoms:  Moderate to severe trouble with coordination, speech, memory, or attention.  Trouble staying awake.  Severe confusion.  A seizure.  Light-headedness.  Fainting.  Vomiting bright red blood or material that looks like coffee grounds.  Bloody stool (feces). The blood may be bright red, or it may make stool look black and tarry and make it smell bad.  Shakiness when trying to stop drinking.  Thoughts about hurting yourself or others.

## 2021-11-02 VITALS — SYSTOLIC BLOOD PRESSURE: 163 MMHG | HEART RATE: 85 BPM | TEMPERATURE: 99 F | DIASTOLIC BLOOD PRESSURE: 99 MMHG

## 2021-11-02 PROCEDURE — 99284 EMERGENCY DEPT VISIT MOD MDM: CPT | Mod: 25

## 2021-11-02 PROCEDURE — 36415 COLL VENOUS BLD VENIPUNCTURE: CPT

## 2021-11-02 PROCEDURE — 85025 COMPLETE CBC W/AUTO DIFF WBC: CPT

## 2021-11-02 PROCEDURE — 0225U NFCT DS DNA&RNA 21 SARSCOV2: CPT

## 2021-11-02 PROCEDURE — 96374 THER/PROPH/DIAG INJ IV PUSH: CPT | Mod: XU

## 2021-11-02 PROCEDURE — 96361 HYDRATE IV INFUSION ADD-ON: CPT

## 2021-11-02 PROCEDURE — 84702 CHORIONIC GONADOTROPIN TEST: CPT

## 2021-11-02 PROCEDURE — 74177 CT ABD & PELVIS W/CONTRAST: CPT | Mod: MA

## 2021-11-02 PROCEDURE — 96375 TX/PRO/DX INJ NEW DRUG ADDON: CPT

## 2021-11-02 PROCEDURE — 83690 ASSAY OF LIPASE: CPT

## 2021-11-02 PROCEDURE — 96376 TX/PRO/DX INJ SAME DRUG ADON: CPT

## 2021-11-02 PROCEDURE — 93005 ELECTROCARDIOGRAM TRACING: CPT

## 2021-11-02 PROCEDURE — 80053 COMPREHEN METABOLIC PANEL: CPT

## 2021-11-02 PROCEDURE — 81001 URINALYSIS AUTO W/SCOPE: CPT

## 2021-11-02 PROCEDURE — 80307 DRUG TEST PRSMV CHEM ANLYZR: CPT

## 2021-11-02 PROCEDURE — 71045 X-RAY EXAM CHEST 1 VIEW: CPT

## 2021-11-02 RX ORDER — FOLIC ACID 0.8 MG
1 TABLET ORAL ONCE
Refills: 0 | Status: DISCONTINUED | OUTPATIENT
Start: 2021-11-02 | End: 2021-11-06

## 2021-11-02 RX ORDER — DIAZEPAM 5 MG
5 TABLET ORAL ONCE
Refills: 0 | Status: DISCONTINUED | OUTPATIENT
Start: 2021-11-02 | End: 2021-11-02

## 2021-11-02 RX ORDER — PANTOPRAZOLE SODIUM 20 MG/1
20 TABLET, DELAYED RELEASE ORAL ONCE
Refills: 0 | Status: COMPLETED | OUTPATIENT
Start: 2021-11-02 | End: 2021-11-02

## 2021-11-02 RX ORDER — THIAMINE MONONITRATE (VIT B1) 100 MG
100 TABLET ORAL ONCE
Refills: 0 | Status: DISCONTINUED | OUTPATIENT
Start: 2021-11-02 | End: 2021-11-06

## 2021-11-02 RX ORDER — SUCRALFATE 1 G
1 TABLET ORAL ONCE
Refills: 0 | Status: COMPLETED | OUTPATIENT
Start: 2021-11-02 | End: 2021-11-02

## 2021-11-02 RX ADMIN — PANTOPRAZOLE SODIUM 20 MILLIGRAM(S): 20 TABLET, DELAYED RELEASE ORAL at 01:42

## 2021-11-02 RX ADMIN — Medication 5 MILLIGRAM(S): at 09:12

## 2021-11-02 NOTE — ED ADULT NURSE REASSESSMENT NOTE - NSIMPLEMENTINTERV_GEN_ALL_ED
Implemented All Universal Safety Interventions:  [place] to call system. Call bell, personal items and telephone within reach. Instruct patient to call for assistance. Room bathroom lighting operational. Non-slip footwear when patient is off stretcher. Physically safe environment: no spills, clutter or unnecessary equipment. Stretcher in lowest position, wheels locked, appropriate side rails in place.

## 2021-11-02 NOTE — ED ADULT NURSE REASSESSMENT NOTE - NS ED NURSE REASSESS COMMENT FT1
Patient met on unit ambulatory and in no distress. Patient removed IV line and states she want librium PO and ativan IV. Patient alert and oriented and has no physical complaints.  She states she wants to be detoxed but does not want to go to a detox facility(?). She also states she wants to admitted to "check out her other medical conditions".  Patient awaiting disposition.

## 2022-01-05 NOTE — ED PROVIDER NOTE - RESPIRATORY, MLM
Patient will continue to control risk factors including hypertension, hyperlipidemia, diabetes mellitus type 2  He remains on Accupril and Crestor    Patient does have CT coronary calcium scoring performed through his executive health screening which she will do this spring Breath sounds clear and equal bilaterally.

## 2022-02-18 NOTE — ED BEHAVIORAL HEALTH ASSESSMENT NOTE - AXIS IV
Riley
Occupational problems/Problems with primary support/Other psychosocial and environmental problems
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
## 2022-11-19 NOTE — ED ADULT NURSE NOTE - DOES PATIENT HAVE ADVANCE DIRECTIVE
Last appointment: 8/31/2022  Next appointment: Visit date not found  Last refill: 8/22/2022  Left a message for patient to call back to schedule an appointment No

## 2022-11-29 ENCOUNTER — EMERGENCY (EMERGENCY)
Facility: HOSPITAL | Age: 57
LOS: 0 days | Discharge: LEFT BEFORE DISCHARGE | End: 2022-11-30
Attending: EMERGENCY MEDICINE
Payer: MEDICAID

## 2022-11-29 VITALS
SYSTOLIC BLOOD PRESSURE: 130 MMHG | TEMPERATURE: 100 F | HEART RATE: 115 BPM | OXYGEN SATURATION: 100 % | RESPIRATION RATE: 20 BRPM | HEIGHT: 62 IN | WEIGHT: 130.95 LBS | DIASTOLIC BLOOD PRESSURE: 81 MMHG

## 2022-11-29 DIAGNOSIS — F43.10 POST-TRAUMATIC STRESS DISORDER, UNSPECIFIED: ICD-10-CM

## 2022-11-29 DIAGNOSIS — R00.0 TACHYCARDIA, UNSPECIFIED: ICD-10-CM

## 2022-11-29 DIAGNOSIS — R45.1 RESTLESSNESS AND AGITATION: ICD-10-CM
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
## 2022-11-29 DIAGNOSIS — S06.5X0A TRAUMATIC SUBDURAL HEMORRHAGE WITHOUT LOSS OF CONSCIOUSNESS, INITIAL ENCOUNTER: ICD-10-CM

## 2022-11-29 DIAGNOSIS — F43.22 ADJUSTMENT DISORDER WITH ANXIETY: ICD-10-CM

## 2022-11-29 DIAGNOSIS — Z20.822 CONTACT WITH AND (SUSPECTED) EXPOSURE TO COVID-19: ICD-10-CM

## 2022-11-29 DIAGNOSIS — F10.10 ALCOHOL ABUSE, UNCOMPLICATED: ICD-10-CM

## 2022-11-29 DIAGNOSIS — F41.1 GENERALIZED ANXIETY DISORDER: ICD-10-CM

## 2022-11-29 DIAGNOSIS — F32.9 MAJOR DEPRESSIVE DISORDER, SINGLE EPISODE, UNSPECIFIED: ICD-10-CM

## 2022-11-29 DIAGNOSIS — Z98.890 OTHER SPECIFIED POSTPROCEDURAL STATES: Chronic | ICD-10-CM
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
## 2022-11-29 DIAGNOSIS — Z90.89 ACQUIRED ABSENCE OF OTHER ORGANS: Chronic | ICD-10-CM

## 2022-11-29 DIAGNOSIS — F10.120 ALCOHOL ABUSE WITH INTOXICATION, UNCOMPLICATED: ICD-10-CM

## 2022-11-29 DIAGNOSIS — Z98.891 HISTORY OF UTERINE SCAR FROM PREVIOUS SURGERY: Chronic | ICD-10-CM

## 2022-11-29 DIAGNOSIS — S06.9XAA UNSPECIFIED INTRACRANIAL INJURY WITH LOSS OF CONSCIOUSNESS STATUS UNKNOWN, INITIAL ENCOUNTER: ICD-10-CM

## 2022-11-29 DIAGNOSIS — I10 ESSENTIAL (PRIMARY) HYPERTENSION: ICD-10-CM

## 2022-11-29 DIAGNOSIS — F41.0 PANIC DISORDER [EPISODIC PAROXYSMAL ANXIETY]: ICD-10-CM

## 2022-11-29 LAB
BASOPHILS # BLD AUTO: 0.04 K/UL — SIGNIFICANT CHANGE UP (ref 0–0.2)
BASOPHILS NFR BLD AUTO: 0.4 % — SIGNIFICANT CHANGE UP (ref 0–2)
EOSINOPHIL # BLD AUTO: 0.16 K/UL — SIGNIFICANT CHANGE UP (ref 0–0.5)
EOSINOPHIL NFR BLD AUTO: 1.6 % — SIGNIFICANT CHANGE UP (ref 0–6)
HCT VFR BLD CALC: 44.3 % — SIGNIFICANT CHANGE UP (ref 34.5–45)
HGB BLD-MCNC: 15.2 G/DL — SIGNIFICANT CHANGE UP (ref 11.5–15.5)
IMM GRANULOCYTES NFR BLD AUTO: 0.3 % — SIGNIFICANT CHANGE UP (ref 0–0.9)
LYMPHOCYTES # BLD AUTO: 1.89 K/UL — SIGNIFICANT CHANGE UP (ref 1–3.3)
LYMPHOCYTES # BLD AUTO: 19 % — SIGNIFICANT CHANGE UP (ref 13–44)
MCHC RBC-ENTMCNC: 31.2 PG — SIGNIFICANT CHANGE UP (ref 27–34)
MCHC RBC-ENTMCNC: 34.3 GM/DL — SIGNIFICANT CHANGE UP (ref 32–36)
MCV RBC AUTO: 91 FL — SIGNIFICANT CHANGE UP (ref 80–100)
MONOCYTES # BLD AUTO: 0.31 K/UL — SIGNIFICANT CHANGE UP (ref 0–0.9)
MONOCYTES NFR BLD AUTO: 3.1 % — SIGNIFICANT CHANGE UP (ref 2–14)
NEUTROPHILS # BLD AUTO: 7.53 K/UL — HIGH (ref 1.8–7.4)
NEUTROPHILS NFR BLD AUTO: 75.6 % — SIGNIFICANT CHANGE UP (ref 43–77)
PLATELET # BLD AUTO: 351 K/UL — SIGNIFICANT CHANGE UP (ref 150–400)
RBC # BLD: 4.87 M/UL — SIGNIFICANT CHANGE UP (ref 3.8–5.2)
RBC # FLD: 12.1 % — SIGNIFICANT CHANGE UP (ref 10.3–14.5)
WBC # BLD: 9.96 K/UL — SIGNIFICANT CHANGE UP (ref 3.8–10.5)
WBC # FLD AUTO: 9.96 K/UL — SIGNIFICANT CHANGE UP (ref 3.8–10.5)

## 2022-11-29 PROCEDURE — 81001 URINALYSIS AUTO W/SCOPE: CPT

## 2022-11-29 PROCEDURE — 36415 COLL VENOUS BLD VENIPUNCTURE: CPT

## 2022-11-29 PROCEDURE — 80053 COMPREHEN METABOLIC PANEL: CPT

## 2022-11-29 PROCEDURE — 93005 ELECTROCARDIOGRAM TRACING: CPT

## 2022-11-29 PROCEDURE — 70450 CT HEAD/BRAIN W/O DYE: CPT | Mod: MG

## 2022-11-29 PROCEDURE — 85025 COMPLETE CBC W/AUTO DIFF WBC: CPT

## 2022-11-29 PROCEDURE — 96372 THER/PROPH/DIAG INJ SC/IM: CPT

## 2022-11-29 PROCEDURE — G1004: CPT

## 2022-11-29 PROCEDURE — 99285 EMERGENCY DEPT VISIT HI MDM: CPT

## 2022-11-29 PROCEDURE — 87635 SARS-COV-2 COVID-19 AMP PRB: CPT

## 2022-11-29 PROCEDURE — 80307 DRUG TEST PRSMV CHEM ANLYZR: CPT

## 2022-11-29 PROCEDURE — 99285 EMERGENCY DEPT VISIT HI MDM: CPT | Mod: 25

## 2022-11-29 RX ORDER — SODIUM CHLORIDE 9 MG/ML
1000 INJECTION INTRAMUSCULAR; INTRAVENOUS; SUBCUTANEOUS ONCE
Refills: 0 | Status: COMPLETED | OUTPATIENT
Start: 2022-11-29 | End: 2022-11-29

## 2022-11-29 RX ADMIN — Medication 2 MILLIGRAM(S): at 23:56

## 2022-11-29 NOTE — ED ADULT NURSE NOTE - NSFALLRSKPASTHIST_ED_ALL_ED
[de-identified] : I saw her in December of last year with complaints of back pain and a lumbar radiculopathy as well as neck pain and a cervical radiculopathy.  She was started on nonsteroidal anti-inflammatory medicine.  She attended physical therapy and all of her spine symptoms resolved.  In the last couple of months she had the onset of left knee pain.  She does not remember a specific injury.  The pain is more to the lateral side of the left knee.  She had an MRI of her knee ordered by her primary care physician.  The pain was considerably worse yesterday.
unable to determine

## 2022-11-29 NOTE — ED ADULT NURSE NOTE - OBJECTIVE STATEMENT
Pt states she is here because she "feels sick" but wants to go home, behavioral work up already started. Pt refusing medications at this time

## 2022-11-29 NOTE — ED ADULT TRIAGE NOTE - CHIEF COMPLAINT QUOTE
pt BIBA complaining of "odd acting behavior" at home. throwing kentrell decorations.  pt states shes "emotionally disturbed. constant observation started.  pt denies SI/HI.
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
## 2022-11-30 VITALS
HEART RATE: 120 BPM | RESPIRATION RATE: 23 BRPM | DIASTOLIC BLOOD PRESSURE: 98 MMHG | SYSTOLIC BLOOD PRESSURE: 138 MMHG | TEMPERATURE: 99 F | OXYGEN SATURATION: 97 %

## 2022-11-30 DIAGNOSIS — F10.20 ALCOHOL DEPENDENCE, UNCOMPLICATED: ICD-10-CM

## 2022-11-30 LAB
ALBUMIN SERPL ELPH-MCNC: 4.3 G/DL — SIGNIFICANT CHANGE UP (ref 3.3–5)
ALP SERPL-CCNC: 131 U/L — HIGH (ref 40–120)
ALT FLD-CCNC: 26 U/L — SIGNIFICANT CHANGE UP (ref 12–78)
ANION GAP SERPL CALC-SCNC: 7 MMOL/L — SIGNIFICANT CHANGE UP (ref 5–17)
APAP SERPL-MCNC: < 2 UG/ML (ref 10–30)
APPEARANCE UR: CLEAR — SIGNIFICANT CHANGE UP
AST SERPL-CCNC: 22 U/L — SIGNIFICANT CHANGE UP (ref 15–37)
BILIRUB SERPL-MCNC: 0.3 MG/DL — SIGNIFICANT CHANGE UP (ref 0.2–1.2)
BILIRUB UR-MCNC: NEGATIVE — SIGNIFICANT CHANGE UP
BUN SERPL-MCNC: 13 MG/DL — SIGNIFICANT CHANGE UP (ref 7–23)
CALCIUM SERPL-MCNC: 8.7 MG/DL — SIGNIFICANT CHANGE UP (ref 8.5–10.1)
CHLORIDE SERPL-SCNC: 108 MMOL/L — SIGNIFICANT CHANGE UP (ref 96–108)
CO2 SERPL-SCNC: 26 MMOL/L — SIGNIFICANT CHANGE UP (ref 22–31)
COLOR SPEC: YELLOW — SIGNIFICANT CHANGE UP
CREAT SERPL-MCNC: 0.74 MG/DL — SIGNIFICANT CHANGE UP (ref 0.5–1.3)
DIFF PNL FLD: ABNORMAL
EGFR: 94 ML/MIN/1.73M2 — SIGNIFICANT CHANGE UP
ETHANOL SERPL-MCNC: 201 MG/DL — HIGH (ref 0–10)
ETHANOL SERPL-MCNC: 74 MG/DL — HIGH (ref 0–10)
GLUCOSE SERPL-MCNC: 93 MG/DL — SIGNIFICANT CHANGE UP (ref 70–99)
GLUCOSE UR QL: NEGATIVE — SIGNIFICANT CHANGE UP
KETONES UR-MCNC: NEGATIVE — SIGNIFICANT CHANGE UP
LEUKOCYTE ESTERASE UR-ACNC: NEGATIVE — SIGNIFICANT CHANGE UP
NITRITE UR-MCNC: NEGATIVE — SIGNIFICANT CHANGE UP
PCP SPEC-MCNC: SIGNIFICANT CHANGE UP
PH UR: 5 — SIGNIFICANT CHANGE UP (ref 5–8)
POTASSIUM SERPL-MCNC: 4.1 MMOL/L — SIGNIFICANT CHANGE UP (ref 3.5–5.3)
POTASSIUM SERPL-SCNC: 4.1 MMOL/L — SIGNIFICANT CHANGE UP (ref 3.5–5.3)
PROT SERPL-MCNC: 8.5 GM/DL — HIGH (ref 6–8.3)
PROT UR-MCNC: NEGATIVE — SIGNIFICANT CHANGE UP
SALICYLATES SERPL-MCNC: <1.7 MG/DL (ref 2.8–20)
SARS-COV-2 RNA SPEC QL NAA+PROBE: SIGNIFICANT CHANGE UP
SODIUM SERPL-SCNC: 141 MMOL/L — SIGNIFICANT CHANGE UP (ref 135–145)
SP GR SPEC: 1.02 — SIGNIFICANT CHANGE UP (ref 1.01–1.02)
UROBILINOGEN FLD QL: NEGATIVE — SIGNIFICANT CHANGE UP

## 2022-11-30 PROCEDURE — G1004: CPT

## 2022-11-30 PROCEDURE — 90792 PSYCH DIAG EVAL W/MED SRVCS: CPT | Mod: 95

## 2022-11-30 PROCEDURE — 70450 CT HEAD/BRAIN W/O DYE: CPT | Mod: 26,MG

## 2022-11-30 PROCEDURE — 93010 ELECTROCARDIOGRAM REPORT: CPT

## 2022-11-30 RX ORDER — IBUPROFEN 200 MG
600 TABLET ORAL ONCE
Refills: 0 | Status: COMPLETED | OUTPATIENT
Start: 2022-11-30 | End: 2022-11-30

## 2022-11-30 RX ORDER — CLONAZEPAM 1 MG
0.5 TABLET ORAL ONCE
Refills: 0 | Status: DISCONTINUED | OUTPATIENT
Start: 2022-11-30 | End: 2022-11-30

## 2022-11-30 RX ADMIN — Medication 0.5 MILLIGRAM(S): at 10:37

## 2022-11-30 RX ADMIN — Medication 600 MILLIGRAM(S): at 00:40

## 2022-11-30 NOTE — ED BEHAVIORAL HEALTH ASSESSMENT NOTE - OTHER
Family Pt initially presented with poor impulse control but was in good control on assessment Unclear given the discrepancy between pt's report of last night's events vs EMS'
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
## 2022-11-30 NOTE — ED BEHAVIORAL HEALTH ASSESSMENT NOTE - RISK ASSESSMENT
Pt has multiple modifiable risk factors (AUD with recent intoxication, recent depressed mood/anxiety s/p COVID infection, not currently connected to outpt psych services) and chronic risk factors (previous psych dx/admission, h/o DTs, h/o active SI with thoughts to OD), which are mitigated by several protective factors (no pSA/SIB, no e/o psychosis/mateo, future-orientation, cites children as her protective factors).
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
## 2022-11-30 NOTE — ED BEHAVIORAL HEALTH PROGRESS NOTE - SUMMARY
Patient is a 57 F, , domiciled alone, has adult children, per psyckes carries d/o JOJO, PTSD, MDD, Panic disorder, adjustment disorder, and severe alcohol use disorder including h/o withdrawal DTs, remission status unclear, 1 previous psych admission at [facility] May 2013, multiple inpatient detox/rehabs, denies pSA or NSSIB, PMH significant for SDH , ?TBI, and HTN, not in outpatient psych treatment, but reports taking zoloft 100 mg and Klonopin 0.5 mg BID prescribed by her outpatient NP, who per chart was BIB EMS a/b family for a psych eval after being combative at home, including throwing kentrell decs. Shortly after arriving to the ED, the patient was found to have a  and she attempted to elope, requiring emergent medications for safety.    The patient was given time to metabolize and on reassessment, she presents with no signs or symptoms of an acute mood or psychotic episode, denies any current or recent SI/HI, demonstrates future-orientation, and is not interested in inpt/outpt rehab services. Despite her reassuring exam, however, there is a significant discrepancy between the charted reason she was brought to the ED and the reason she provides - which is that she activated EMS herself to address chest discomfort.     Patient reassessed this AM, maintains she called EMS herself and that she lives alone, daughter was called and she had no knowledge of any events overnight or that patient was in the hospital. Patient denies SHIIP. No AVH. No mateo. No paranoia. Endorses intermittent social EtOH use, like minimizing as per past Hx of EtOH abuse and BAL upon arrival to ED, declined detox/rehab referrals. Patient is not presenting as an imminent risk for harm to self, and does not meet criteria for involuntary in-patient hospitalization. Patient and agreeable to discharge plan, and engaged in safety planning. Patient to follow-up with out-patient provider in the near future.

## 2022-11-30 NOTE — ED BEHAVIORAL HEALTH NOTE - BEHAVIORAL HEALTH NOTE
========================     FOR EACH COLLATERAL     ========================     Collateral below has requested that the information provided remain confidential: Yes [  ] No [ X ]     Collateral below has provided information that patient is/may be unaware of: Yes [  ] No [ X ]     Patient gives permission to obtain collateral from _____:     ( X ) Yes     (  )  No     Rationale for overriding objection               ( X ) Lack of capacity. Details: BTCM was unable to reach collateral and left a voicemail.                (  ) Assessing risk of danger to self/others. Details: ________     Rationale for selecting specific collateral source               (  ) Potential to impact risk of danger to self/others and no alternative equivalent. Details: _____     NAME: Sanjuana Padilla      NUMBER: [phone]     RELATIONSHIP: Daughter     RELIABILITY: Unreliable

## 2022-11-30 NOTE — ED BEHAVIORAL HEALTH ASSESSMENT NOTE - PAST PSYCHOTROPIC MEDICATION
Per chart/psyckes, was previously on Remeron, Paxil, Wellbutrin, prozac, seroquel, Trazodone, Hydroxyzine, Gabapentin, Clonidine

## 2022-11-30 NOTE — ED BEHAVIORAL HEALTH ASSESSMENT NOTE - DETAILS
Per chart, mother with depression and father with AUD See hpi This writer updated ED provider At this time it is unclear whether pt or family activated EMS. Team left VM for collateral contact in chart

## 2022-11-30 NOTE — ED BEHAVIORAL HEALTH PROGRESS NOTE - CASE SUMMARY/FORMULATION (CLEARLY DOCUMENT RATIONALE FOR DISPOSITION CHANGE)
Patient is not presenting as an imminent risk for harm to self, and does not meet criteria for involuntary in-patient hospitalization. Patient agreeable to discharge plan, and engaged in safety planning. Patient to follow-up with out-patient provider in the near future.
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
## 2022-11-30 NOTE — ED BEHAVIORAL HEALTH ASSESSMENT NOTE - SUMMARY
Patient is a 57 F, , domiciled alone, has adult children, per psyckes carries d/o JOJO, PTSD, MDD, Panic disorder, adjustment disorder, and severe alcohol use disorder including h/o withdrawal DTs, remission status unclear, 1 previous psych admission at [facility] May 2013, multiple inpatient detox/rehabs, denies pSA or NSSIB, PMH significant for SDH , ?TBI, and HTN, not in outpatient psych treatment, but reports taking zoloft 100 mg and Klonopin 0.5 mg BID prescribed by her outpatient NP, who per chart was BIB EMS a/b family for a psych eval after being combative at home, including throwing kentrell decs. Shortly after arriving to the ED, the patient was found to have a  and she attempted to elope, requiring emergent medications for safety.    On reassessment, the patient pre Patient is a 57 F, , domiciled alone, has adult children, per psyckes carries d/o JOJO, PTSD, MDD, Panic disorder, adjustment disorder, and severe alcohol use disorder including h/o withdrawal DTs, remission status unclear, 1 previous psych admission at [facility] May 2013, multiple inpatient detox/rehabs, denies pSA or NSSIB, PMH significant for SDH , ?TBI, and HTN, not in outpatient psych treatment, but reports taking zoloft 100 mg and Klonopin 0.5 mg BID prescribed by her outpatient NP, who per chart was BIB EMS a/b family for a psych eval after being combative at home, including throwing kentrell decs. Shortly after arriving to the ED, the patient was found to have a  and she attempted to elope, requiring emergent medications for safety.    The patient was given time to metabolize and on reassessment, she presents with no signs or symptoms of an acute mood or psychotic episode, denies any current or recent SI/HI, demonstrates future-orientation, and is not interested in inpt/outpt rehab services. Despite her reassuring exam, however, there is a significant discrepancy between the charted reason she was brought to the ED and the reason she provides - which is that she activated EMS herself to address chest discomfort. Given this discrepancy, will plan to hold the patient in the ED to attempt to obtain collateral during daytime hours to ensure there are no acute safety concerns.    Plan:  -Hold for collateral  -Start [facility] protocol for etoh withdrawal precautions  -Maintain on 1:1 for elopement precautions  -For episodes of agitation can offer PO Haldol 5 mg/Ativan 2 mg Q6H PRN. If refusing PO and is in acute risk of harm to self/other, can offer IM Haldol 5 mg/Versed 2 mg Q6H PRN. If given, please repeat ecg and hold antipsychotics if QTC>500

## 2022-11-30 NOTE — ED BEHAVIORAL HEALTH PROGRESS NOTE - OTHER
Pt initially presented with poor impulse control but was in good control on assessment Unclear given the discrepancy between pt's report of last night's events vs EMS

## 2022-11-30 NOTE — ED BEHAVIORAL HEALTH PROGRESS NOTE - NSBHATTESTAPPBILLTIME_PSY_A_CORE
I attest my time as COREY is greater than 50% of the total combined time spent on qualifying patient care activities. I have reviewed and verified the documentation.

## 2023-01-20 NOTE — PROGRESS NOTE ADULT - EXTREMITIES
[facility]   Physical Therapy Treatment Note     Name: Tere Velasco  Clinic Number: [id number]    Therapy Diagnosis:   Encounter Diagnoses   Name Primary?    Chronic bilateral low back pain without sciatica Yes    Pain aggravated by standing     Decreased range of motion of lumbar spine          Physician: Rosy Santos MD    Visit Date: 1/20/2023    Physician Orders: PT Eval and Treat   Medical Diagnosis: M54.50,G89.29 (ICD-10-CM) - Chronic bilateral low back pain without sciatica  Evaluation Date: 1/6/2023  Authorization Period Expiration: 11/14/2023  Plan of Care Certification Period: 1/6/2023 to 03/03/2023  Visit # / Visits authorized: 5/20 FOTO:  5/5  PTA Visit #: 4/5     Time In: 2:00 PM  Time Out: 2:55 PM  Total Billable Time: 55 minutes (4 TE)    Precautions: CKD, fall     SUBJECTIVE     Pt reports: Minimal change in symptoms. Increased fatigue due to being unable to sleep last night.   She was not given home exercise program.  Response to previous treatment: Mild soreness  Functional change: Ongoing    Pain: 4/10  Location: bilateral lumbosacral area    OBJECTIVE     Objective Measures updated at progress report unless specified.     Treatment     Tere received the treatments listed below:      therapeutic exercises to develop strength, endurance, ROM, flexibility, and posture for 55 minutes including:  Nu step 6' lvl 2  SLR 2 x 10 B  Hip abduction with red band 3 x 10  Hip adduction with yellow ball 3 x 10  Bridges 2 x 8 partial range due to pain  Sit to stand from elevated mat 3 x 10  Walking loop 7 loops x ~150 ft with rolling walker   Heel raises 2 x 10  Standing hip abduction/extension: 2x10 B  Lateral walking 6 lengths x 10 feet with yellow theraband       Patient Education and Home Exercises     Home Exercises Provided and Patient Education Provided     Education provided:   - PT diagnosis and recommended treatment course.   - benefits of LSO  - benefits of  walker for ambulation.     Written Home Exercises Provided: Yes. Exercises were reviewed and Tere was able to demonstrate them prior to the end of the session. Tere demonstrated good  understanding of the education provided. See EMR under Patient Instructions for exercises provided during therapy sessions    ASSESSMENT     Tere is a 83 y.o. female referred to outpatient Physical Therapy with a medical diagnosis of M54.50,G89.29 (ICD-10-CM) - Chronic bilateral low back pain without sciatica. Known scoliosis as well as sagittal plane thoracolumbar changes. Denies radicular symptoms. Deconditioning over the last 3+ months due to decreased tolerance to standing/walking activity due to back pain. She arrived to session with reports of moderate back pain but was agreeable to treatment.  Session focused on mostly mat based BLE strengthening and walking trials with rolling walker demo to encourage upright posture.  No increase in pain with any exercise.  Patient reported a subjective decrease in pain before exiting clinic but was not specific to scale.  Will monitor patient response to session and progress as appropriate.    Tere Is progressing well towards her goals.   Pt prognosis is Fair.     Pt will continue to benefit from skilled outpatient physical therapy to address the deficits listed in the problem list box on initial evaluation, provide pt/family education and to maximize pt's level of independence in the home and community environment.     Pt's spiritual, cultural and educational needs considered and pt agreeable to plan of care and goals.     Anticipated barriers to physical therapy: age, motivation, co-morbities, advanced scoliosis    Goals: Short Term Goals: 3 weeks  1.  Patient will demonstrate understanding and compliance with home exercise program.   2.  Patient will demonstrate ability to perform >8 sit-to-stands from standard chair height without use of upper extremity for improved functional  transfer ability.   3.  Patient will report >10% improvement on KAREEM.      Long Term Goals: 8 weeks   1. Patient will demonstrate ability to complete 6MWT within age appropriate limits with appropriate assistive device.   2. Patient will report <53% limitation on FOTO survey.   3. Patient will demonstrate at least 8 point improvement on [place] Balance Scale score.   4. Patient will demonstrate ability to stand > 15 minutes without increased low back pain.     PLAN     Certification Period/Plan of care expiration: 1/6/2023 to 03/03/2023.  Cont with treatment per ARIEL Gomes PTA              No cyanosis, clubbing or edema

## 2023-02-18 NOTE — ED PROVIDER NOTE - CARDIAC, MLM
Please follow-up with your primary care provider in the next 24 to 72 hours as needed. Please return to the emergency department for any new or worsening symptoms.  You can try taking Maalox prescription as needed for symptomatic relief.    
Normal rate, regular rhythm.  Heart sounds S1, S2.  No murmurs, rubs or gallops.

## 2023-06-06 ENCOUNTER — EMERGENCY (EMERGENCY)
Facility: HOSPITAL | Age: 58
LOS: 0 days | Discharge: HOME | End: 2023-06-06
Attending: FAMILY MEDICINE
Payer: SELF-PAY

## 2023-06-06 VITALS
HEART RATE: 82 BPM | WEIGHT: 139.99 LBS | DIASTOLIC BLOOD PRESSURE: 82 MMHG | TEMPERATURE: 99 F | OXYGEN SATURATION: 100 % | RESPIRATION RATE: 17 BRPM | SYSTOLIC BLOOD PRESSURE: 137 MMHG | HEIGHT: 64 IN

## 2023-06-06 DIAGNOSIS — F41.9 ANXIETY DISORDER, UNSPECIFIED: ICD-10-CM

## 2023-06-06 DIAGNOSIS — S40.811A ABRASION OF RIGHT UPPER ARM, INITIAL ENCOUNTER: ICD-10-CM

## 2023-06-06 DIAGNOSIS — Z86.59 PERSONAL HISTORY OF OTHER MENTAL AND BEHAVIORAL DISORDERS: ICD-10-CM
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
## 2023-06-06 DIAGNOSIS — Z85.72 PERSONAL HISTORY OF NON-HODGKIN LYMPHOMAS: ICD-10-CM

## 2023-06-06 DIAGNOSIS — Z90.89 ACQUIRED ABSENCE OF OTHER ORGANS: Chronic | ICD-10-CM

## 2023-06-06 DIAGNOSIS — S40.812A ABRASION OF LEFT UPPER ARM, INITIAL ENCOUNTER: ICD-10-CM
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
## 2023-06-06 DIAGNOSIS — F43.10 POST-TRAUMATIC STRESS DISORDER, UNSPECIFIED: ICD-10-CM

## 2023-06-06 DIAGNOSIS — Z98.890 OTHER SPECIFIED POSTPROCEDURAL STATES: Chronic | ICD-10-CM

## 2023-06-06 DIAGNOSIS — Z98.891 HISTORY OF UTERINE SCAR FROM PREVIOUS SURGERY: Chronic | ICD-10-CM

## 2023-06-06 DIAGNOSIS — I10 ESSENTIAL (PRIMARY) HYPERTENSION: ICD-10-CM

## 2023-06-06 DIAGNOSIS — Z90.89 ACQUIRED ABSENCE OF OTHER ORGANS: ICD-10-CM

## 2023-06-06 DIAGNOSIS — S20.319A ABRASION OF UNSPECIFIED FRONT WALL OF THORAX, INITIAL ENCOUNTER: ICD-10-CM

## 2023-06-06 PROCEDURE — 99283 EMERGENCY DEPT VISIT LOW MDM: CPT

## 2023-06-06 PROCEDURE — 99282 EMERGENCY DEPT VISIT SF MDM: CPT
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
## 2023-06-06 NOTE — ED ADULT NURSE NOTE - NSFALLUNIVINTERV_ED_ALL_ED
Bed/Stretcher in lowest position, wheels locked, appropriate side rails in place/Call bell, personal items and telephone in reach/Instruct patient to call for assistance before getting out of bed/chair/stretcher/Non-slip footwear applied when patient is off stretcher/[place] to call system/Physically safe environment - no spills, clutter or unnecessary equipment/Purposeful proactive rounding/Room/bathroom lighting operational, light cord in reach
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
## 2023-06-06 NOTE — ED PROVIDER NOTE - NSFOLLOWUPINSTRUCTIONS_ED_ALL_ED_FT
Keep wounds clean and dry. Apply bacitracin and watch for signs of infection. Take tylenol for discomfort. Follow up with your doctor as needed.

## 2023-06-06 NOTE — ED PROVIDER NOTE - PATIENT PORTAL LINK FT
You can access the FollowMyHealth Patient Portal offered by [facility] by registering at the following website: [email or web address]. By joining [facility]’s FollowMyHealth portal, you will also be able to view your health information using other applications (apps) compatible with our system.

## 2023-06-06 NOTE — ED PROVIDER NOTE - NEUROLOGICAL, MLM
Alert and oriented, no focal deficits, no motor or sensory deficits.
This document is complete and the patient is ready for discharge.
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
## 2023-08-01 NOTE — ED BEHAVIORAL HEALTH PROGRESS NOTE - NSBHMSEHYG_PSY_A_CORE
pt states he does not urinate except very little on random days and says he does not have urine to give today. NP notified.    Fair

## 2023-08-16 NOTE — ED ADULT TRIAGE NOTE - SOURCE OF INFORMATION
Patient

## 2023-08-28 NOTE — ED PROVIDER NOTE - NEUROLOGICAL, MLM
Detail Level: Detailed Detail Level: Generalized Detail Level: Zone Alert and oriented, no focal deficits, no motor or sensory deficits.

## 2023-08-31 NOTE — ED ADULT NURSE NOTE - CAS EDN DISCHARGE ASSESSMENT
Mild CHEMO with AHI 5.1. This is associated with snoring and excessive daytime sleepiness. Unfortunately she has not done well with CPAP and brought her machine to return to [facility] today. She has tried multiple mask including nasal pillows and fullface mask. We discussed alternative options for the treatment of mild obstructive sleep apnea including dental appliances, positional therapy, weight loss. Currently she feels that weight loss is probably her best option. I have placed a referral for weight management. She can follow-up with us as needed if she needs to restart CPAP or be referred for dental appliance therapy.     Her sleep apnea is too mild to qualify for hypoglossal nerve stimulation
Alert and oriented to person, place and time

## 2023-10-26 NOTE — ED PROVIDER NOTE - NS ED MD DISPO DISCHARGE
Quality 431: Preventive Care And Screening: Unhealthy Alcohol Use - Screening: Patient not identified as an unhealthy alcohol user when screened for unhealthy alcohol use using a systematic screening method Quality 110: Preventive Care And Screening: Influenza Immunization: Influenza Immunization previously received during influenza season Detail Level: Simple Quality 226: Preventive Care And Screening: Tobacco Use: Screening And Cessation Intervention: Patient screened for tobacco use and is an ex/non-smoker Home

## 2023-12-11 NOTE — ED PROVIDER NOTE - DATE/TIME 3

## 2024-01-31 NOTE — ED PROVIDER NOTE - PMH
Called to get patient scheduled for labs - VIT D and CMP only.   No answer. Left message .  
Anxiety    ETOH abuse    Hypertension    PTSD (post-traumatic stress disorder)    Subdural hematoma

## 2024-02-08 NOTE — ED PROVIDER NOTE - DATE/TIME 1
Chronic Disease Management (CDM) Services  Diabetes - Progress Note    Referring Provider: Elvira Schuster MD   Supervising Provider: Mansi Patrick MD  Order Expiration Date: 01/10/25    Jamal Granda is a 67 year old White male presenting for follow-up visit for management of diabetes    Visit History:   24: RESTART Trulicity 0.75 mg subcut injection weekly  01/10/24: RESTART Trulicity 1.5 mg subcut injection once weekly   23: no changes made  23: no changes made  23: INCR metformin-glipizide 500-5 mg two tablets twice daily before meals  02/10/23: no changes made   22: no changes made   22: no changes made  22: INCR Trulicity 3 mg subcut injection weekly, DECR metformin-glipizide 500-5 mg twice daily  22: INCR Trulicity 1.5 mg subcut injection weekly, DECR metformin-glipizide 500-5 mg twice daily  22: START Trulicity 0.75 mg subcut injection weekly  22: INCR metformin-glipizide 500-5 mg 2 tablets in the morning and 1 tablet in the evening before meals, START aspirin 81 mg daily    Recent Hospitalizations: No    HPI    Patient reports has not been able to obtain Trulicity since last visit due to back order. Patient endorses ocassional cough with mucus production throughout the day since pneumonia. Patient reports taking NyQuil every night with no improvement in cough.    Diabetes    Type 2 Diabetes  Symptoms: none     SMBG: available to assess per meter  -checking infrequently  FP this AM, 130s mg/dL not at goal  PPG: n/a  Hypoglycemia: none  In-office: n/a mg/dL    Routine Health Maintenance:  Statin: Yes   ACE / ARB / ARNI: Yes   Aspirin: Yes   Dilated eye exam: Not up to date   Foot exam: Not up to date  UACR: Up to date    Diet:   Patient states he does not follow a strict diet and eats whatever is available at home   Beverages: orange juice and coffee everyday with cream   Sodium restriction: no     Lifestyle:  Exercise: walking  1x/week  Alcohol consumption: No  Nicotine use: former smoker quit 01/2023    Medication Adherence: medications reviewed at today's visit  Barriers to Adherence: forgetfulness    Current Outpatient Medications   Medication Instructions    Alcohol Swabs Pads Use to clean skin to check blood sugar twice daily    aspirin (ECOTRIN) 81 mg, Oral, DAILY    atorvastatin (LIPITOR) 40 mg, Oral, DAILY    Blood Glucose Monitoring Suppl w/Device Kit Use to check blood sugar twice daily    blood glucose test strip Test blood sugar 2 times daily as directed. Diagnosis: T2DM.    diclofenac (VOLTAREN) 2 g, Topical, 4 TIMES DAILY, Apply to the knees.    dulaglutide (TRULICITY) 0.75 mg, Subcutaneous, EVERY 7 DAYS    fluticasone (FLONASE) 50 MCG/ACT nasal spray 2 sprays, Nasal, DAILY    glipizide-metformin (METAGLIP) 5-500 MG per tablet 2 tablets before breakfast and 1 tablet before dinner    Lancets Thin Misc Use to check blood sugar up to twice daily    lisinopril (ZESTRIL) 2.5 mg, Oral, DAILY    sildenafil (VIAGRA) 50 mg, Oral, PRN    zoster vaccine recomb adjuvanted (Shingrix) 50 MCG/0.5ML injection 0.5 mLs, Intramuscular, 1 time     ALLERGIES:  No Known Allergies    Vitals:   BP Readings from Last 3 Encounters:   02/08/24 138/77   01/10/24 123/71   08/25/23 131/77     Pulse Readings from Last 3 Encounters:   02/08/24 66   01/10/24 74   08/25/23 71     Wt Readings from Last 3 Encounters:   02/08/24 84.5 kg   01/10/24 83.6 kg   08/25/23 83.4 kg     Labs:  Glucose Blood, POC (mg/dL)   Date Value   06/09/2023 107 (A)     Hemoglobin A1C (%)   Date Value   01/05/2024 7.0 (H)     Potassium (mmol/L)   Date Value   01/05/2024 4.6     Creatinine (mg/dL)   Date Value   01/05/2024 0.72     Microalbumin/ Creatinine Ratio (mg/g)   Date Value   05/05/2023 8.5     Glomerular Filtration Rate (no units)   Date Value   01/05/2024 >90     Cholesterol (mg/dL)   Date Value   01/05/2024 108     HDL (mg/dL)   Date Value   01/05/2024 50     LDL (mg/dL)    Date Value   01/05/2024 31     Triglycerides (mg/dL)   Date Value   01/05/2024 133     The ASCVD Risk score (Dejan DK, et al., 2019) failed to calculate for the following reasons:    The valid total cholesterol range is 130 to 320 mg/dL       Assessment / Plan    Diabetes  Goal(s): A1c < 7.0%, FPG  mg/dL, 2-hour PPG <180 mg/dL  Assessment:  SMBG: not at goal  A1c: at goal     Current Medications:   Metformin-glipizide 500-5 mg 2 tablets twice daily (taking 2 tablets AM and 1 tablet PM) and Trulicity 1.5 mg subcut injection weekly (not taking)    Recommendations:   RESTART Trulicity 0.75 mg subcut injection weekly since has been out for >2 months  CPM metformin-glipizide 500-5 mg    Continue to check blood glucose at home    Blood Pressure  Goal(s): <130/80 mmHg  BP: did not take medications today, office - at goal    Current Medications:   Lisinopril 2.5 mg daily    Recommendations:   Continue same medication regimen since did not taking BP medication prior to visit    Primary Prevention  Goal(s): LDL < 100 mg/dL  Lipids: at goal     Current Medications:   Atorvastatin 40 mg daily and aspirin 81 mg daily (takes infrequently)    Recommendations:   Continue same medication regimen   Counseled patient on importance of taking medications as prescribed     Labs due: A1c, due on 04/2024    Follow-up: 03/07/24 & 04/12/24 w/ PCP    Counseling:  General  -A1c and SMBG goals  -Importance of checking blood glucose as directed  -Importance of eating a balanced diet that includes vegetables, fruits, and whole grains     Medications  -None    Patient verbalized understanding and agreement with plan. Patient provided with information about how to contact CDM Clinician with any questions.     15 minutes were spent via face-to-face discussion related to the medical management of the patient.    Genie Love, PharmD, BCACP  Ambulatory Pharmacy Specialist - Chronic Disease Management  [facility]       Patient Privacy Notice  The 21st Century Cures Act makes medical notes like these available to patients in the interest of transparency.   Please be advised this is a medical document. Medical documents are intended to carry relevant information and the clinical opinion of the practitioner.   The medical note is used as communication between medical providers and may appear blunt or direct.   The medical note is written in medical language and may contain abbreviations or verbiage that are unfamiliar.      30-Nov-2022 05:45

## 2024-04-30 NOTE — PATIENT PROFILE ADULT - EQUIPMENT CURRENTLY USED AT HOME
Kwabena is a 74 year old who is being evaluated via a billable video visit.    Amber Sandoval   Kwabena is a 74 year old, presenting for the following health issues:  Pre-Op Exam (/)          JULIAN Kurtz LPN    
no

## 2024-05-28 NOTE — ED PROVIDER NOTE - MEDICAL DECISION MAKING DETAILS
[Negative] : Heme/Lymph pt presenting with anxiety and alcohol abuse no signs of acute withdrawal at this time will give Benzo, r/o ACS given CP and age, likely d/c.

## 2024-07-18 NOTE — PATIENT PROFILE ADULT - NSSCCAGEALCOHOLCUTDOWN_GEN_A_NUR
Name: Naveen Whaley    : 1981     [facility]  [address], [address]  [facility] [address]  Dept: [phone]  Dept Fax: [phone]     Chief Complaint   Patient presents with    Elbow Pain     Right        There were no vitals taken for this visit.     No Known Allergies     Current Outpatient Medications   Medication Sig Dispense Refill    ibuprofen (ADVIL;MOTRIN) 200 MG tablet Take by mouth every 6 hours as needed       No current facility-administered medications for this visit.       There is no problem list on file for this patient.     Family History   Family history unknown: Yes       History reviewed. No pertinent surgical history.   History reviewed. No pertinent past medical history.     I have reviewed and agree with [facility] and ROS and intake form in chart and the record furthermore I have reviewed prior medical record(s) regarding this patients care during this appointment.     Review of Systems:   Patient is a pleasant appearing individual, appropriately dressed, well hydrated, well nourished, who is alert, appropriately oriented for age, and in no acute distress with a normal gait and normal affect who does not appear to be in any significant pain.    Physical Exam:  Left Elbow - Full Range of motion, No point tenderness, No instability, Normal Strength, No skin lesions, No swelling, Grossly neurovascularly intact.      Physical examination of his right elbow shows extremely limited range of motion with significant arthritic changes already on his x-rays. Hardware appears to be intact. He is grossly neurovascularly intact but he has a significant amount of swelling and stiffness and arthrofibrosis.   Encounter Diagnosis   Name Primary?    Right elbow pain Yes         HPI:  The patient is here with right elbow pain, throbbing, burning pain.  Diagnosed with right distal humerus fracture.   yes

## 2024-07-29 NOTE — ED PROVIDER NOTE - MDM ORDERS SUBMITTED SELECTION
"Gildardo Escobar   [id number]    Your doctor has referred you for a CT examination that requires the injection of an iodinated contrast material into your bloodstream. This iodinated contrast material, sometimes referred to as \"x-ray dye\" allows for better interpretation of the x-ray films or CT images and results in a more accurate interpretation of the examination.     Without the use of iodinated contrast (x-ray dye), the examination may be less informative and result in a suboptimal examination, and possibly a delay in diagnosis and, if needed, treatment.     The iodinated contrast material is given through a small needle or catheter placed into a vein, usually on the inside of the elbow, on the back of hand, or in a vein in the foot or lower leg.    The most common, though still rare, potential reaction to an intravenous contrast injection is an allergic-like reaction. Most allergic-like reactions are mild, though a small subset of people can have more severe reactions. Mild reactions include mild / scattered hives, itching, scratchy throat, sneezing and nasal congestion. More severe reactions include facial swelling, severe difficulty breathing, wheezing and anaphylactic shock. Those with a prior history allergic-like reaction to the same class of contrast media (such as CT contrast or MRI contrast) are at the highest risk for an allergic reaction.     If you believe you had an allergic reaction to contrast in the past, please let our staff know. We can determine if this increases your risk for a future reaction and provide steps to decrease the risk. This may delay your examination, but it decreases the risk of having a new and possibly more severe reaction to the contrast injection.    People with a history of prior allergic reactions to other substances (such as unrelated medications and food) and patients with a history of asthma have slightly increased risk for an allergic reaction from contrast material " when compared with the general population, but do not require to be pretreated prior to a contrast injection.    You should notify the physician, nurse or technologist if you have ever had any of these conditions or if you have any questions.      Not Applicable

## 2024-09-23 NOTE — ED PROVIDER NOTE - MUSCULOSKELETAL [+], MLM
PAST SURGICAL HISTORY:  No Past Surgical History     S/P colon resection     
+difficulty bearing weight, +right foot pain

## 2024-09-23 NOTE — ED PROVIDER NOTE - TEMPLATE, MLM
glaucoma specialist tomorrow 9/24   General

## 2024-12-05 ENCOUNTER — INPATIENT (INPATIENT)
Facility: HOSPITAL | Age: 59
LOS: 2 days | Discharge: LEFT BEFORE DISCHARGE | DRG: 204 | End: 2024-12-08
Attending: STUDENT IN AN ORGANIZED HEALTH CARE EDUCATION/TRAINING PROGRAM | Admitting: STUDENT IN AN ORGANIZED HEALTH CARE EDUCATION/TRAINING PROGRAM
Payer: MEDICAID

## 2024-12-05 VITALS — WEIGHT: 132.72 LBS

## 2024-12-05 DIAGNOSIS — Z98.891 HISTORY OF UTERINE SCAR FROM PREVIOUS SURGERY: Chronic | ICD-10-CM

## 2024-12-05 DIAGNOSIS — Z98.890 OTHER SPECIFIED POSTPROCEDURAL STATES: Chronic | ICD-10-CM

## 2024-12-05 DIAGNOSIS — R55 SYNCOPE AND COLLAPSE: ICD-10-CM

## 2024-12-05 DIAGNOSIS — Z90.89 ACQUIRED ABSENCE OF OTHER ORGANS: Chronic | ICD-10-CM

## 2024-12-05 LAB
ALBUMIN SERPL ELPH-MCNC: 3.9 G/DL — SIGNIFICANT CHANGE UP (ref 3.3–5)
ALP SERPL-CCNC: 125 U/L — HIGH (ref 40–120)
ALT FLD-CCNC: 18 U/L — SIGNIFICANT CHANGE UP (ref 12–78)
ANION GAP SERPL CALC-SCNC: 4 MMOL/L — LOW (ref 5–17)
APTT BLD: 30.8 SEC — SIGNIFICANT CHANGE UP (ref 24.5–35.6)
AST SERPL-CCNC: 17 U/L — SIGNIFICANT CHANGE UP (ref 15–37)
BASOPHILS # BLD AUTO: 0.02 K/UL — SIGNIFICANT CHANGE UP (ref 0–0.2)
BASOPHILS NFR BLD AUTO: 0.2 % — SIGNIFICANT CHANGE UP (ref 0–2)
BILIRUB SERPL-MCNC: 0.4 MG/DL — SIGNIFICANT CHANGE UP (ref 0.2–1.2)
BUN SERPL-MCNC: 15 MG/DL — SIGNIFICANT CHANGE UP (ref 7–23)
CALCIUM SERPL-MCNC: 9.2 MG/DL — SIGNIFICANT CHANGE UP (ref 8.5–10.1)
CHLORIDE SERPL-SCNC: 106 MMOL/L — SIGNIFICANT CHANGE UP (ref 96–108)
CO2 SERPL-SCNC: 25 MMOL/L — SIGNIFICANT CHANGE UP (ref 22–31)
CREAT SERPL-MCNC: 0.62 MG/DL — SIGNIFICANT CHANGE UP (ref 0.5–1.3)
EGFR: 102 ML/MIN/1.73M2 — SIGNIFICANT CHANGE UP
EOSINOPHIL # BLD AUTO: 0.1 K/UL — SIGNIFICANT CHANGE UP (ref 0–0.5)
EOSINOPHIL NFR BLD AUTO: 1.2 % — SIGNIFICANT CHANGE UP (ref 0–6)
ETHANOL SERPL-MCNC: <10 MG/DL — SIGNIFICANT CHANGE UP (ref 0–10)
GLUCOSE SERPL-MCNC: 99 MG/DL — SIGNIFICANT CHANGE UP (ref 70–99)
HCT VFR BLD CALC: 36.7 % — SIGNIFICANT CHANGE UP (ref 34.5–45)
HGB BLD-MCNC: 12.3 G/DL — SIGNIFICANT CHANGE UP (ref 11.5–15.5)
IMM GRANULOCYTES NFR BLD AUTO: 0.2 % — SIGNIFICANT CHANGE UP (ref 0–0.9)
INR BLD: 0.96 RATIO — SIGNIFICANT CHANGE UP (ref 0.85–1.16)
LYMPHOCYTES # BLD AUTO: 1.23 K/UL — SIGNIFICANT CHANGE UP (ref 1–3.3)
LYMPHOCYTES # BLD AUTO: 15.1 % — SIGNIFICANT CHANGE UP (ref 13–44)
MCHC RBC-ENTMCNC: 31.9 PG — SIGNIFICANT CHANGE UP (ref 27–34)
MCHC RBC-ENTMCNC: 33.5 G/DL — SIGNIFICANT CHANGE UP (ref 32–36)
MCV RBC AUTO: 95.3 FL — SIGNIFICANT CHANGE UP (ref 80–100)
MONOCYTES # BLD AUTO: 0.5 K/UL — SIGNIFICANT CHANGE UP (ref 0–0.9)
MONOCYTES NFR BLD AUTO: 6.2 % — SIGNIFICANT CHANGE UP (ref 2–14)
NEUTROPHILS # BLD AUTO: 6.25 K/UL — SIGNIFICANT CHANGE UP (ref 1.8–7.4)
NEUTROPHILS NFR BLD AUTO: 77.1 % — HIGH (ref 43–77)
PLATELET # BLD AUTO: 280 K/UL — SIGNIFICANT CHANGE UP (ref 150–400)
POTASSIUM SERPL-MCNC: 3.9 MMOL/L — SIGNIFICANT CHANGE UP (ref 3.5–5.3)
POTASSIUM SERPL-SCNC: 3.9 MMOL/L — SIGNIFICANT CHANGE UP (ref 3.5–5.3)
PROT SERPL-MCNC: 7.7 GM/DL — SIGNIFICANT CHANGE UP (ref 6–8.3)
PROTHROM AB SERPL-ACNC: 11 SEC — SIGNIFICANT CHANGE UP (ref 9.9–13.4)
RBC # BLD: 3.85 M/UL — SIGNIFICANT CHANGE UP (ref 3.8–5.2)
RBC # FLD: 11.9 % — SIGNIFICANT CHANGE UP (ref 10.3–14.5)
SODIUM SERPL-SCNC: 135 MMOL/L — SIGNIFICANT CHANGE UP (ref 135–145)
TROPONIN I, HIGH SENSITIVITY RESULT: <3 NG/L — SIGNIFICANT CHANGE UP
WBC # BLD: 8.12 K/UL — SIGNIFICANT CHANGE UP (ref 3.8–10.5)
WBC # FLD AUTO: 8.12 K/UL — SIGNIFICANT CHANGE UP (ref 3.8–10.5)

## 2024-12-05 PROCEDURE — 70450 CT HEAD/BRAIN W/O DYE: CPT | Mod: 26,MC

## 2024-12-05 PROCEDURE — 36415 COLL VENOUS BLD VENIPUNCTURE: CPT

## 2024-12-05 PROCEDURE — 83735 ASSAY OF MAGNESIUM: CPT

## 2024-12-05 PROCEDURE — 95816 EEG AWAKE AND DROWSY: CPT

## 2024-12-05 PROCEDURE — 73020 X-RAY EXAM OF SHOULDER: CPT | Mod: LT

## 2024-12-05 PROCEDURE — 84443 ASSAY THYROID STIM HORMONE: CPT

## 2024-12-05 PROCEDURE — 70551 MRI BRAIN STEM W/O DYE: CPT | Mod: MC

## 2024-12-05 PROCEDURE — 73020 X-RAY EXAM OF SHOULDER: CPT | Mod: 26,LT

## 2024-12-05 PROCEDURE — 93010 ELECTROCARDIOGRAM REPORT: CPT

## 2024-12-05 PROCEDURE — 73030 X-RAY EXAM OF SHOULDER: CPT | Mod: 26,LT

## 2024-12-05 PROCEDURE — 80053 COMPREHEN METABOLIC PANEL: CPT

## 2024-12-05 PROCEDURE — 99223 1ST HOSP IP/OBS HIGH 75: CPT

## 2024-12-05 PROCEDURE — 71045 X-RAY EXAM CHEST 1 VIEW: CPT | Mod: 26

## 2024-12-05 PROCEDURE — 85027 COMPLETE CBC AUTOMATED: CPT

## 2024-12-05 PROCEDURE — 93306 TTE W/DOPPLER COMPLETE: CPT

## 2024-12-05 PROCEDURE — 99285 EMERGENCY DEPT VISIT HI MDM: CPT

## 2024-12-05 PROCEDURE — 73060 X-RAY EXAM OF HUMERUS: CPT | Mod: 26,LT

## 2024-12-05 PROCEDURE — 84480 ASSAY TRIIODOTHYRONINE (T3): CPT

## 2024-12-05 PROCEDURE — 84436 ASSAY OF TOTAL THYROXINE: CPT

## 2024-12-05 PROCEDURE — 84100 ASSAY OF PHOSPHORUS: CPT

## 2024-12-05 RX ORDER — OXYCODONE HYDROCHLORIDE 30 MG/1
5 TABLET ORAL
Refills: 0 | Status: DISCONTINUED | OUTPATIENT
Start: 2024-12-05 | End: 2024-12-06

## 2024-12-05 RX ORDER — ONDANSETRON HYDROCHLORIDE 4 MG/1
4 TABLET, FILM COATED ORAL EVERY 8 HOURS
Refills: 0 | Status: DISCONTINUED | OUTPATIENT
Start: 2024-12-05 | End: 2024-12-08

## 2024-12-05 RX ORDER — CLONIDINE HYDROCHLORIDE 0.3 MG/1
0.2 TABLET ORAL DAILY
Refills: 0 | Status: DISCONTINUED | OUTPATIENT
Start: 2024-12-06 | End: 2024-12-08

## 2024-12-05 RX ORDER — ROSUVASTATIN CALCIUM 5 MG/1
1 TABLET, FILM COATED ORAL
Refills: 0 | DISCHARGE

## 2024-12-05 RX ORDER — HYDROMORPHONE HYDROCHLORIDE 2 MG/1
0.5 TABLET ORAL
Refills: 0 | Status: DISCONTINUED | OUTPATIENT
Start: 2024-12-05 | End: 2024-12-06

## 2024-12-05 RX ORDER — ROSUVASTATIN CALCIUM 5 MG/1
20 TABLET, FILM COATED ORAL AT BEDTIME
Refills: 0 | Status: DISCONTINUED | OUTPATIENT
Start: 2024-12-05 | End: 2024-12-08

## 2024-12-05 RX ORDER — MAGNESIUM, ALUMINUM HYDROXIDE 200-225/5
30 SUSPENSION, ORAL (FINAL DOSE FORM) ORAL EVERY 4 HOURS
Refills: 0 | Status: DISCONTINUED | OUTPATIENT
Start: 2024-12-05 | End: 2024-12-08

## 2024-12-05 RX ORDER — OXYCODONE HYDROCHLORIDE 30 MG/1
5 TABLET ORAL ONCE
Refills: 0 | Status: DISCONTINUED | OUTPATIENT
Start: 2024-12-05 | End: 2024-12-05

## 2024-12-05 RX ORDER — ACETAMINOPHEN 500MG 500 MG/1
1000 TABLET, COATED ORAL EVERY 6 HOURS
Refills: 0 | Status: DISCONTINUED | OUTPATIENT
Start: 2024-12-05 | End: 2024-12-08

## 2024-12-05 RX ORDER — OXYCODONE HYDROCHLORIDE 30 MG/1
10 TABLET ORAL
Refills: 0 | Status: DISCONTINUED | OUTPATIENT
Start: 2024-12-05 | End: 2024-12-06

## 2024-12-05 RX ORDER — 0.9 % SODIUM CHLORIDE 0.9 %
1000 INTRAVENOUS SOLUTION INTRAVENOUS ONCE
Refills: 0 | Status: COMPLETED | OUTPATIENT
Start: 2024-12-05 | End: 2024-12-05

## 2024-12-05 RX ORDER — KETOROLAC TROMETHAMINE 30 MG/ML
15 INJECTION INTRAMUSCULAR; INTRAVENOUS ONCE
Refills: 0 | Status: DISCONTINUED | OUTPATIENT
Start: 2024-12-05 | End: 2024-12-05

## 2024-12-05 RX ORDER — HYDROMORPHONE HYDROCHLORIDE 2 MG/1
0.2 TABLET ORAL ONCE
Refills: 0 | Status: DISCONTINUED | OUTPATIENT
Start: 2024-12-05 | End: 2024-12-05

## 2024-12-05 RX ORDER — CLONIDINE HYDROCHLORIDE 0.3 MG/1
1 TABLET ORAL
Refills: 0 | DISCHARGE

## 2024-12-05 RX ORDER — ACETAMINOPHEN, DIPHENHYDRAMINE HCL, PHENYLEPHRINE HCL 325; 25; 5 MG/1; MG/1; MG/1
3 TABLET ORAL AT BEDTIME
Refills: 0 | Status: DISCONTINUED | OUTPATIENT
Start: 2024-12-05 | End: 2024-12-08

## 2024-12-05 RX ORDER — CLONAZEPAM 0.12 MG/1
1 TABLET, ORALLY DISINTEGRATING ORAL
Refills: 0 | DISCHARGE

## 2024-12-05 RX ORDER — KETOROLAC TROMETHAMINE 30 MG/ML
15 INJECTION INTRAMUSCULAR; INTRAVENOUS EVERY 8 HOURS
Refills: 0 | Status: DISCONTINUED | OUTPATIENT
Start: 2024-12-05 | End: 2024-12-05

## 2024-12-05 RX ORDER — ACETAMINOPHEN 500MG 500 MG/1
1000 TABLET, COATED ORAL ONCE
Refills: 0 | Status: COMPLETED | OUTPATIENT
Start: 2024-12-05 | End: 2024-12-05

## 2024-12-05 RX ADMIN — ONDANSETRON HYDROCHLORIDE 4 MILLIGRAM(S): 4 TABLET, FILM COATED ORAL at 22:36

## 2024-12-05 RX ADMIN — OXYCODONE HYDROCHLORIDE 10 MILLIGRAM(S): 30 TABLET ORAL at 22:57

## 2024-12-05 RX ADMIN — Medication 4 MILLIGRAM(S): at 17:25

## 2024-12-05 RX ADMIN — Medication 1000 MILLILITER(S): at 22:37

## 2024-12-05 RX ADMIN — KETOROLAC TROMETHAMINE 15 MILLIGRAM(S): 30 INJECTION INTRAMUSCULAR; INTRAVENOUS at 18:19

## 2024-12-05 RX ADMIN — HYDROMORPHONE HYDROCHLORIDE 0.2 MILLIGRAM(S): 2 TABLET ORAL at 20:44

## 2024-12-05 RX ADMIN — Medication 30 MILLILITER(S): at 22:37

## 2024-12-05 NOTE — ED ADULT NURSE NOTE - CHIEF COMPLAINT QUOTE
Pt ambulatory to ED w/ episode of syncope while making lunch, States it came on suddenly, she woke up and was on the ground. +head strike, not on blood thinners. C/o chest pain and left arm pain. Hx of HTN and "abnormal heart rate." .

## 2024-12-05 NOTE — ED STATDOCS - PROGRESS NOTE DETAILS
59-year-old female with past medical history of PTSD, alcohol abuse, anxiety hypertension and subdural hematoma presents to the ED status post episode of syncope at 1 PM today while at work.  Patient reports she was in her kitchen and without warning woke up on the ground.  Coworkers upstairs in the house found her after they heard a loud thump.  Patient complaining of severe pain in left shoulder with mild headache and nausea.  Patient denies having any warning that she was get a pass out.  Patient denies dizziness headache chest pain shortness of breath prior to onset.  Patient did not have illness over the last couple of days with fevers chills vomiting or diarrhea.  Patient denies any seizure activity per witnesses.  Patient denies any tongue biting or incontinence.  Will follow-up labs and imaging and reevaluate patient.  Paula Ahuja PA-C X-rays were reviewed and there is a comminuted nondisplaced left side humeral head fracture with likely surgical neck fracture.  Chest x-ray was clear.  CAT scan of head showed no evidence of an acute traumatic intracranial hemorrhage or injury.  There was mild to moderate atrophy.  There was a probable small right parietal developmental venous anomaly near the vertex.  I discussed this finding with ISAIAH Winslow from neurosurgery who reported this is benign in nature and there is nothing to do for this currently.  I also discussed patient with Dr. Jack for humeral head and neck fracture and patient was placed in sling.  Paula Ahuja PA-C

## 2024-12-05 NOTE — ED ADULT NURSE NOTE - OBJECTIVE STATEMENT
pt presenting to the ed C/O L arm pain S/P syncopal episode. Pt states fell from standing height, unsure HS, denies AC use. denies N/Vdizziness/blurry vision prior to fall.

## 2024-12-05 NOTE — ED STATDOCS - CLINICAL SUMMARY MEDICAL DECISION MAKING FREE TEXT BOX
Pt with hx of HTN. Pt with syncopal episode with resulting head and L shoulder injury. Do cardio/syncopal workup ,head CT and shoulder

## 2024-12-05 NOTE — CONSULT NOTE ADULT - SUBJECTIVE AND OBJECTIVE BOX
59y Female presents with L shoulder pain and limited ROM due to pain sp MF earlier today. States she passed out and does not remember what happened. States she was confused for at least 10 min after waking. Denies any numbness/tingling in the affected extremity. Does not know if she hit her head. Denies any other orthopedic injuries at this time. Patient is RIGHT hand dominant. Patient ambulates without assistance at baseline. Denies fever, chills, HA, dizziness, chest pain, SOB, N/V/D, urinary frequency, urgency, or incontinence.     PAST MEDICAL & SURGICAL HISTORY:  ETOH abuse      Subdural hematoma      Hypertension      Anxiety      No pertinent past medical history      Alcohol abuse      PTSD (post-traumatic stress disorder)      No significant past surgical history      S/P       S/P tonsillectomy      H/O ovarian cystectomy        Home Medications:  folic acid 1 mg oral tablet: 1 tab(s) orally once a day (19 Mar 2020 11:52)  Multiple Vitamins oral tablet: 1 tab(s) orally once a day (19 Mar 2020 11:52)  thiamine 100 mg oral tablet: 1 tab(s) orally once a day (19 Mar 2020 11:52)    Allergies    No Known Allergies    Intolerances                              12.3   8.12  )-----------( 280      ( 05 Dec 2024 16:38 )             36.7     12-    135  |  106  |  15  ----------------------------<  99  3.9   |  25  |  0.62    Ca    9.2      05 Dec 2024 16:38    TPro  7.7  /  Alb  3.9  /  TBili  0.4  /  DBili  x   /  AST  17  /  ALT  18  /  AlkPhos  125[H]  12-05    PT/INR - ( 05 Dec 2024 16:38 )   PT: 11.0 sec;   INR: 0.96 ratio         PTT - ( 05 Dec 2024 16:38 )  PTT:30.8 sec  Urinalysis Basic - ( 05 Dec 2024 16:38 )    Color: x / Appearance: x / SG: x / pH: x  Gluc: 99 mg/dL / Ketone: x  / Bili: x / Urobili: x   Blood: x / Protein: x / Nitrite: x   Leuk Esterase: x / RBC: x / WBC x   Sq Epi: x / Non Sq Epi: x / Bacteria: x          Vital Signs Last 24 Hrs  T(C): 36.7 (05 Dec 2024 14:32), Max: 36.7 (05 Dec 2024 14:32)  T(F): 98.1 (05 Dec 2024 14:32), Max: 98.1 (05 Dec 2024 14:32)  HR: 82 (05 Dec 2024 14:32) (82 - 82)  BP: 153/94 (05 Dec 2024 14:32) (153/94 - 153/94)  BP(mean): 113 (05 Dec 2024 14:32) (113 - 113)  RR: 18 (05 Dec 2024 14:32) (18 - 18)  SpO2: 100% (05 Dec 2024 14:32) (100% - 100%)    Parameters below as of 05 Dec 2024 14:32  Patient On (Oxygen Delivery Method): room air        PHYSICAL EXAM  General: In no acute distress, well appearing  LUE:  Skin intact, no abrasions or erythema  Limited ROM of shoulder and elbow 2/2 shoulder pain  Motor: AIN/PIN/Ulnr/Radl/Musc/Med/Ax  Sensory: SILT Ax/Med/Rad/Uln/Musc  Compartments soft and compressible  RP+     Secondary Assessment:  NC/AT, NTTP of clavicles, NTTP of Pelvis  RUEs: NTTP of Shoulder, Elbow, Wrist, Hand; NT with AROM/PROM of Shoulder, Elbow, Wrist, Hand; AIN/PIN/Med/Uln/Msc/Rad/Ax intact  LEs: Able to SLR, NT with Log Roll, NT with Heel Strike, NTTP of Hips, Knees, Ankles, Feet; NT with AROM/PROM of Hips, Knees, Ankles, Feet; Q/H/Gsc/TA/EHL/FHL intact       IMAGING:  XR L Shoulder: IMPRESSION: Comminuted surgical neck fracture with no displacement. Chest unremarkable.      Assessment/Plan:  59y Female with L Proximal humerus Fx    Pain control prn  DVT ppx: per primary  NWB LUE in shoulder sling, gentle ROM of elbow and wrist   No acute orthopaedic surgical intervention indicated at this time. This patient is orthopaedically stable for discharge.   Patient to follow up with Dr. Monique as an outpatient for further evaluation and management.   All of the patient's questions and concerns were answered and addressed.     To discuss with Dr. Monique for any further management and recommendations    59y Female presents with L shoulder pain and limited ROM due to pain sp syncopal Fall earlier today. States she passed out and does not remember what happened. States she was confused for at least 10 min after waking. Denies any numbness/tingling in the affected extremity. Does not know if she hit her head. Denies any other orthopedic injuries at this time. Patient is RIGHT hand dominant. Patient ambulates without assistance at baseline. Denies fever, chills, HA, dizziness, chest pain, SOB, N/V/D, urinary frequency, urgency, or incontinence.     PAST MEDICAL & SURGICAL HISTORY:  ETOH abuse      Subdural hematoma      Hypertension      Anxiety      No pertinent past medical history      Alcohol abuse      PTSD (post-traumatic stress disorder)      No significant past surgical history      S/P       S/P tonsillectomy      H/O ovarian cystectomy        Home Medications:  folic acid 1 mg oral tablet: 1 tab(s) orally once a day (19 Mar 2020 11:52)  Multiple Vitamins oral tablet: 1 tab(s) orally once a day (19 Mar 2020 11:52)  thiamine 100 mg oral tablet: 1 tab(s) orally once a day (19 Mar 2020 11:52)    Allergies    No Known Allergies    Intolerances                              12.3   8.12  )-----------( 280      ( 05 Dec 2024 16:38 )             36.7     12-    135  |  106  |  15  ----------------------------<  99  3.9   |  25  |  0.62    Ca    9.2      05 Dec 2024 16:38    TPro  7.7  /  Alb  3.9  /  TBili  0.4  /  DBili  x   /  AST  17  /  ALT  18  /  AlkPhos  125[H]  12-05    PT/INR - ( 05 Dec 2024 16:38 )   PT: 11.0 sec;   INR: 0.96 ratio         PTT - ( 05 Dec 2024 16:38 )  PTT:30.8 sec  Urinalysis Basic - ( 05 Dec 2024 16:38 )    Color: x / Appearance: x / SG: x / pH: x  Gluc: 99 mg/dL / Ketone: x  / Bili: x / Urobili: x   Blood: x / Protein: x / Nitrite: x   Leuk Esterase: x / RBC: x / WBC x   Sq Epi: x / Non Sq Epi: x / Bacteria: x          Vital Signs Last 24 Hrs  T(C): 36.7 (05 Dec 2024 14:32), Max: 36.7 (05 Dec 2024 14:32)  T(F): 98.1 (05 Dec 2024 14:32), Max: 98.1 (05 Dec 2024 14:32)  HR: 82 (05 Dec 2024 14:32) (82 - 82)  BP: 153/94 (05 Dec 2024 14:32) (153/94 - 153/94)  BP(mean): 113 (05 Dec 2024 14:32) (113 - 113)  RR: 18 (05 Dec 2024 14:32) (18 - 18)  SpO2: 100% (05 Dec 2024 14:32) (100% - 100%)    Parameters below as of 05 Dec 2024 14:32  Patient On (Oxygen Delivery Method): room air        PHYSICAL EXAM  General: In no acute distress, well appearing  LUE:  Skin intact, no abrasions or erythema  Limited ROM of shoulder and elbow 2/2 shoulder pain  Motor: AIN/PIN/Ulnr/Radl/Musc/Med/Ax  Sensory: SILT Ax/Med/Rad/Uln/Musc  Compartments soft and compressible  RP+     Secondary Assessment:  NC/AT, NTTP of clavicles, NTTP of Pelvis  RUEs: NTTP of Shoulder, Elbow, Wrist, Hand; NT with AROM/PROM of Shoulder, Elbow, Wrist, Hand; AIN/PIN/Med/Uln/Msc/Rad/Ax intact  LEs: Able to SLR, NT with Log Roll, NT with Heel Strike, NTTP of Hips, Knees, Ankles, Feet; NT with AROM/PROM of Hips, Knees, Ankles, Feet; Q/H/Gsc/TA/EHL/FHL intact       IMAGING:  XR L Shoulder: IMPRESSION: Comminuted surgical neck fracture with no displacement. Chest unremarkable.      Assessment/Plan:  59y Female with L Proximal humerus Fx    Pain control prn  DVT ppx: per primary  NWB LUE in shoulder sling, gentle ROM of elbow and wrist   No acute orthopaedic surgical intervention indicated at this time. This patient is orthopaedically stable for discharge.   Patient to follow up with Dr. Monique as an outpatient for further evaluation and management.   All of the patient's questions and concerns were answered and addressed.     To discuss with Dr. Monique for any further management and recommendations

## 2024-12-05 NOTE — H&P ADULT - NSHPPHYSICALEXAM_GEN_ALL_CORE
Vital Signs Last 24 Hrs  T(C): 36.7 (05 Dec 2024 14:32), Max: 36.7 (05 Dec 2024 14:32)  T(F): 98.1 (05 Dec 2024 14:32), Max: 98.1 (05 Dec 2024 14:32)  HR: 82 (05 Dec 2024 14:32) (82 - 82)  BP: 153/94 (05 Dec 2024 14:32) (153/94 - 153/94)  BP(mean): 113 (05 Dec 2024 14:32) (113 - 113)  RR: 18 (05 Dec 2024 14:32) (18 - 18)  SpO2: 100% (05 Dec 2024 14:32) (100% - 100%)    Parameters below as of 05 Dec 2024 14:32  Patient On (Oxygen Delivery Method): room air    CONSTITUTIONAL: Well groomed, no apparent distress  Head: Normocephalic  Eyes: No conjunctival icterus    RESP: RA  CV: RR  GI: ND  Extremities: Difficulty moving LUE  NEURO: CN II-XII intact   PSYCH: AOx3

## 2024-12-05 NOTE — H&P ADULT - ASSESSMENT
59y Female presents with L shoulder pain and limited ROM due to pain after syncopal fall earlier today. States she passed out and does not remember what happened. States she was confused for at least 10 min after waking. Denies any numbness/tingling in the affected extremity. Does not know if she hit her head. Denies any other orthopedic injuries at this time. Patient is R hand dominant. Patient ambulates without assistance at baseline. Denies fever, chills, HA, dizziness, chest pain, SOB, N/V/D, urinary frequency, urgency, or incontinence. Patient states that the last thing she remembers is getting up from a sitting position to get some coffee. Patient states that she has a history of seizures in the past, but hasn't had a seizure in years.     #Syncope likely from Orthostatic Hypotension, Seizure, and/or Arrythmia  #Dehydration  - IV fluids  - Orthostats pending  - Discussed starting Keppra with patient. Patient does not want any antiepileptics at this time.  - Telemetry  - Neurology consulted    #LUANGELINA Fracture  - Pain regimen  - Ortho consulted    #HTN  - Continue clonidine    #HLD  - Continue statin

## 2024-12-05 NOTE — H&P ADULT - HISTORY OF PRESENT ILLNESS
59y Female presents with L shoulder pain and limited ROM due to pain after syncopal fall earlier today. States she passed out and does not remember what happened. States she was confused for at least 10 min after waking. Denies any numbness/tingling in the affected extremity. Does not know if she hit her head. Denies any other orthopedic injuries at this time. Patient is R hand dominant. Patient ambulates without assistance at baseline. Denies fever, chills, HA, dizziness, chest pain, SOB, N/V/D, urinary frequency, urgency, or incontinence. Patient states that the last thing she remembers is getting up from a sitting position to get some coffee. Patient states that she has a history of seizures in the past, but hasn't had a seizure in years.     PMH: TBI, Epilepsy, HTN, Anxiety, and HLD  PSH: Ovarian cyst removal  Meds: Reviewed  Allergies: NKDA  FH: CAD  SH: No tobacco, rarely drinks alcohol, and no recreational drug use

## 2024-12-05 NOTE — ED STATDOCS - PHYSICAL EXAMINATION
Physical Exam:  Gen: NAD, non-toxic appearing, able to ambulate without assistance  Head: NCAT  HEENT: EOMI, PEERLA, normal conjunctiva, tongue midline, oral mucosa moist  Lung: CTAB, no respiratory distress, no wheezes/rhonchi/rales B/L, speaking in full sentences  CV: RRR, no murmurs, rubs or gallops, distal pulses 2+ b/l  Abd: soft, nontender, no distention, no guarding, no rigidity, no rebound tenderness  MSK: no visible deformities, ROM normal in UE/LE  Skin: Warm, well perfused, no rash  Psych: normal affect, calm Physical Exam:  Gen: NAD, non-toxic appearing, able to ambulate without assistance  Head: NCAT  HEENT: EOMI, PEERLA, normal conjunctiva, tongue midline, oral mucosa moist  Lung: CTAB, no respiratory distress, no wheezes/rhonchi/rales B/L, speaking in full sentences  CV: RRR, no murmurs, rubs or gallops, distal pulses 2+ b/l  Abd: soft, nontender, no distention, no guarding, no rigidity, no rebound tenderness  MSK:  Pt holding L shoulder at inflexion , tenderness of L shoulder   Skin: abrasion to L forehead  Psych: normal affect, calm

## 2024-12-05 NOTE — ED ADULT NURSE REASSESSMENT NOTE - NS ED NURSE REASSESS COMMENT FT1
pt reports feeling too hot and touching radiator, requested to be moved. Pt moved from RW 6 to RW 4. Pt updated on plan of care, pt awaiting admission bed. medicated as per MAR.

## 2024-12-05 NOTE — ED STATDOCS - ATTENDING APP SHARED VISIT CONTRIBUTION OF CARE
I,Hayden Arshad MD,  performed the initial face to face bedside interview with this patient regarding history of present illness, review of symptoms and relevant past medical, social and family history.  I completed an independent physical examination.  I was the initial provider who evaluated this patient. I have signed out the follow up of any pending tests (i.e. labs, radiological studies) to the ACP.  I have communicated the patient’s plan of care and disposition with the ACP.  The history, relevant review of systems, past medical and surgical history, medical decision making, and physical examination was documented by the scribe in my presence and I attest to the accuracy of the documentation.

## 2024-12-05 NOTE — ED ADULT TRIAGE NOTE - CHIEF COMPLAINT QUOTE
Pt ambulatory to ED w/ episode of syncope while making lunch, States it came on suddenly, she woke up and was on the ground. C/o chest pain and left arm pain. Hx of HTN and "abnormal heart rate." . Pt ambulatory to ED w/ episode of syncope while making lunch, States it came on suddenly, she woke up and was on the ground. +head strike, not on blood thinners. C/o chest pain and left arm pain. Hx of HTN and "abnormal heart rate." .

## 2024-12-05 NOTE — ED ADULT NURSE NOTE - NSFALLRISKINTERV_ED_ALL_ED

## 2024-12-06 ENCOUNTER — RESULT REVIEW (OUTPATIENT)
Age: 59
End: 2024-12-06

## 2024-12-06 DIAGNOSIS — R55 SYNCOPE AND COLLAPSE: ICD-10-CM

## 2024-12-06 LAB
ADD ON TEST-SPECIMEN IN LAB: SIGNIFICANT CHANGE UP
ADD ON TEST-SPECIMEN IN LAB: SIGNIFICANT CHANGE UP
ALBUMIN SERPL ELPH-MCNC: 3.8 G/DL — SIGNIFICANT CHANGE UP (ref 3.3–5)
ALP SERPL-CCNC: 133 U/L — HIGH (ref 40–120)
ALT FLD-CCNC: 18 U/L — SIGNIFICANT CHANGE UP (ref 12–78)
ANION GAP SERPL CALC-SCNC: 3 MMOL/L — LOW (ref 5–17)
AST SERPL-CCNC: 13 U/L — LOW (ref 15–37)
BILIRUB SERPL-MCNC: 0.5 MG/DL — SIGNIFICANT CHANGE UP (ref 0.2–1.2)
BUN SERPL-MCNC: 14 MG/DL — SIGNIFICANT CHANGE UP (ref 7–23)
CALCIUM SERPL-MCNC: 9 MG/DL — SIGNIFICANT CHANGE UP (ref 8.5–10.1)
CHLORIDE SERPL-SCNC: 105 MMOL/L — SIGNIFICANT CHANGE UP (ref 96–108)
CO2 SERPL-SCNC: 28 MMOL/L — SIGNIFICANT CHANGE UP (ref 22–31)
CREAT SERPL-MCNC: 0.67 MG/DL — SIGNIFICANT CHANGE UP (ref 0.5–1.3)
EGFR: 100 ML/MIN/1.73M2 — SIGNIFICANT CHANGE UP
GLUCOSE SERPL-MCNC: 127 MG/DL — HIGH (ref 70–99)
HCT VFR BLD CALC: 36.6 % — SIGNIFICANT CHANGE UP (ref 34.5–45)
HGB BLD-MCNC: 12.4 G/DL — SIGNIFICANT CHANGE UP (ref 11.5–15.5)
MAGNESIUM SERPL-MCNC: 2.5 MG/DL — SIGNIFICANT CHANGE UP (ref 1.6–2.6)
MCHC RBC-ENTMCNC: 32.4 PG — SIGNIFICANT CHANGE UP (ref 27–34)
MCHC RBC-ENTMCNC: 33.9 G/DL — SIGNIFICANT CHANGE UP (ref 32–36)
MCV RBC AUTO: 95.6 FL — SIGNIFICANT CHANGE UP (ref 80–100)
PHOSPHATE SERPL-MCNC: 3.3 MG/DL — SIGNIFICANT CHANGE UP (ref 2.5–4.5)
PLATELET # BLD AUTO: 232 K/UL — SIGNIFICANT CHANGE UP (ref 150–400)
POTASSIUM SERPL-MCNC: 3.9 MMOL/L — SIGNIFICANT CHANGE UP (ref 3.5–5.3)
POTASSIUM SERPL-SCNC: 3.9 MMOL/L — SIGNIFICANT CHANGE UP (ref 3.5–5.3)
PROT SERPL-MCNC: 7.4 GM/DL — SIGNIFICANT CHANGE UP (ref 6–8.3)
RBC # BLD: 3.83 M/UL — SIGNIFICANT CHANGE UP (ref 3.8–5.2)
RBC # FLD: 12 % — SIGNIFICANT CHANGE UP (ref 10.3–14.5)
SODIUM SERPL-SCNC: 136 MMOL/L — SIGNIFICANT CHANGE UP (ref 135–145)
TSH SERPL-MCNC: 4.83 UU/ML — HIGH (ref 0.34–4.82)
WBC # BLD: 6.27 K/UL — SIGNIFICANT CHANGE UP (ref 3.8–10.5)
WBC # FLD AUTO: 6.27 K/UL — SIGNIFICANT CHANGE UP (ref 3.8–10.5)

## 2024-12-06 PROCEDURE — 99233 SBSQ HOSP IP/OBS HIGH 50: CPT

## 2024-12-06 PROCEDURE — 93306 TTE W/DOPPLER COMPLETE: CPT | Mod: 26

## 2024-12-06 PROCEDURE — 99223 1ST HOSP IP/OBS HIGH 75: CPT

## 2024-12-06 PROCEDURE — 99222 1ST HOSP IP/OBS MODERATE 55: CPT

## 2024-12-06 RX ORDER — LANOLIN ALCOHOL/MO/W.PET/CERES
100 CREAM (GRAM) TOPICAL DAILY
Refills: 0 | Status: DISCONTINUED | OUTPATIENT
Start: 2024-12-06 | End: 2024-12-08

## 2024-12-06 RX ORDER — OXYCODONE HYDROCHLORIDE 30 MG/1
2.5 TABLET ORAL EVERY 4 HOURS
Refills: 0 | Status: DISCONTINUED | OUTPATIENT
Start: 2024-12-06 | End: 2024-12-07

## 2024-12-06 RX ORDER — LORAZEPAM 2 MG/1
2 TABLET ORAL
Refills: 0 | Status: DISCONTINUED | OUTPATIENT
Start: 2024-12-06 | End: 2024-12-06

## 2024-12-06 RX ORDER — CYANOCOBALAMIN/FOLIC AC/VIT B6 1-2.2-25MG
1 TABLET ORAL DAILY
Refills: 0 | Status: DISCONTINUED | OUTPATIENT
Start: 2024-12-06 | End: 2024-12-08

## 2024-12-06 RX ORDER — CLONAZEPAM 0.12 MG/1
0.5 TABLET, ORALLY DISINTEGRATING ORAL EVERY 8 HOURS
Refills: 0 | Status: DISCONTINUED | OUTPATIENT
Start: 2024-12-06 | End: 2024-12-08

## 2024-12-06 RX ORDER — DIPHENHYDRAMINE HCL 25 MG
25 CAPSULE ORAL EVERY 4 HOURS
Refills: 0 | Status: DISCONTINUED | OUTPATIENT
Start: 2024-12-06 | End: 2024-12-08

## 2024-12-06 RX ORDER — SODIUM CHLORIDE 9 MG/ML
1000 INJECTION, SOLUTION INTRAMUSCULAR; INTRAVENOUS; SUBCUTANEOUS ONCE
Refills: 0 | Status: COMPLETED | OUTPATIENT
Start: 2024-12-06 | End: 2024-12-06

## 2024-12-06 RX ORDER — GLUCOSAMINE SULFATE DIPOT CHLR 500 MG
1 CAPSULE ORAL DAILY
Refills: 0 | Status: DISCONTINUED | OUTPATIENT
Start: 2024-12-06 | End: 2024-12-08

## 2024-12-06 RX ORDER — HYDROMORPHONE HYDROCHLORIDE 2 MG/1
0.2 TABLET ORAL EVERY 6 HOURS
Refills: 0 | Status: DISCONTINUED | OUTPATIENT
Start: 2024-12-06 | End: 2024-12-08

## 2024-12-06 RX ORDER — HYDROMORPHONE HYDROCHLORIDE 2 MG/1
0.5 TABLET ORAL ONCE
Refills: 0 | Status: DISCONTINUED | OUTPATIENT
Start: 2024-12-06 | End: 2024-12-06

## 2024-12-06 RX ORDER — OXYCODONE HYDROCHLORIDE 30 MG/1
5 TABLET ORAL EVERY 4 HOURS
Refills: 0 | Status: DISCONTINUED | OUTPATIENT
Start: 2024-12-06 | End: 2024-12-06

## 2024-12-06 RX ORDER — ONDANSETRON HYDROCHLORIDE 4 MG/1
4 TABLET, FILM COATED ORAL EVERY 6 HOURS
Refills: 0 | Status: DISCONTINUED | OUTPATIENT
Start: 2024-12-06 | End: 2024-12-08

## 2024-12-06 RX ORDER — SODIUM CHLORIDE 9 MG/ML
1000 INJECTION, SOLUTION INTRAMUSCULAR; INTRAVENOUS; SUBCUTANEOUS
Refills: 0 | Status: DISCONTINUED | OUTPATIENT
Start: 2024-12-06 | End: 2024-12-06

## 2024-12-06 RX ORDER — LORAZEPAM 2 MG/1
0.5 TABLET ORAL THREE TIMES A DAY
Refills: 0 | Status: DISCONTINUED | OUTPATIENT
Start: 2024-12-06 | End: 2024-12-06

## 2024-12-06 RX ADMIN — HYDROMORPHONE HYDROCHLORIDE 0.2 MILLIGRAM(S): 2 TABLET ORAL at 18:52

## 2024-12-06 RX ADMIN — HYDROMORPHONE HYDROCHLORIDE 0.5 MILLIGRAM(S): 2 TABLET ORAL at 03:10

## 2024-12-06 RX ADMIN — ACETAMINOPHEN 500MG 1000 MILLIGRAM(S): 500 TABLET, COATED ORAL at 17:33

## 2024-12-06 RX ADMIN — ACETAMINOPHEN 500MG 1000 MILLIGRAM(S): 500 TABLET, COATED ORAL at 23:30

## 2024-12-06 RX ADMIN — SODIUM CHLORIDE 75 MILLILITER(S): 9 INJECTION, SOLUTION INTRAMUSCULAR; INTRAVENOUS; SUBCUTANEOUS at 20:53

## 2024-12-06 RX ADMIN — HYDROMORPHONE HYDROCHLORIDE 0.5 MILLIGRAM(S): 2 TABLET ORAL at 23:30

## 2024-12-06 RX ADMIN — CLONAZEPAM 0.5 MILLIGRAM(S): 0.12 TABLET, ORALLY DISINTEGRATING ORAL at 21:54

## 2024-12-06 RX ADMIN — OXYCODONE HYDROCHLORIDE 2.5 MILLIGRAM(S): 30 TABLET ORAL at 16:53

## 2024-12-06 RX ADMIN — HYDROMORPHONE HYDROCHLORIDE 0.2 MILLIGRAM(S): 2 TABLET ORAL at 19:40

## 2024-12-06 RX ADMIN — HYDROMORPHONE HYDROCHLORIDE 0.2 MILLIGRAM(S): 2 TABLET ORAL at 12:32

## 2024-12-06 RX ADMIN — ONDANSETRON HYDROCHLORIDE 4 MILLIGRAM(S): 4 TABLET, FILM COATED ORAL at 04:20

## 2024-12-06 RX ADMIN — ROSUVASTATIN CALCIUM 20 MILLIGRAM(S): 5 TABLET, FILM COATED ORAL at 23:30

## 2024-12-06 RX ADMIN — OXYCODONE HYDROCHLORIDE 2.5 MILLIGRAM(S): 30 TABLET ORAL at 21:49

## 2024-12-06 RX ADMIN — HYDROMORPHONE HYDROCHLORIDE 0.5 MILLIGRAM(S): 2 TABLET ORAL at 07:58

## 2024-12-06 RX ADMIN — SODIUM CHLORIDE 1000 MILLILITER(S): 9 INJECTION, SOLUTION INTRAMUSCULAR; INTRAVENOUS; SUBCUTANEOUS at 23:30

## 2024-12-06 RX ADMIN — CLONAZEPAM 0.5 MILLIGRAM(S): 0.12 TABLET, ORALLY DISINTEGRATING ORAL at 13:03

## 2024-12-06 RX ADMIN — SODIUM CHLORIDE 75 MILLILITER(S): 9 INJECTION, SOLUTION INTRAMUSCULAR; INTRAVENOUS; SUBCUTANEOUS at 18:25

## 2024-12-06 RX ADMIN — LORAZEPAM 0.5 MILLIGRAM(S): 2 TABLET ORAL at 04:20

## 2024-12-06 RX ADMIN — OXYCODONE HYDROCHLORIDE 2.5 MILLIGRAM(S): 30 TABLET ORAL at 22:48

## 2024-12-06 NOTE — DISCHARGE NOTE PROVIDER - CARE PROVIDER_API CALL
Bryan Monique  Orthopaedic Surgery  [address] [place] [address]  Phone: [phone]  Fax: [phone]  Follow Up Time: 1 week    Radha Patel  Cardiology  [address] [place] [address]  Phone: [phone]  Fax: [phone]  Follow Up Time: 1 week    Sean Lomeli  Neurology  [address] [facility], [address]  [place] [address]  Phone: [phone]  Fax: [phone]  Follow Up Time: 1 week

## 2024-12-06 NOTE — DISCHARGE NOTE PROVIDER - NSDCCAREPROVSEEN_GEN_ALL_CORE_FT
Amanda, Radha Miramontes, Keely Akbar, Nel Perkins, Javier Cruz, Gabriele Florence, Kalpana Lomeli, Sean Moore, Cezar White, Karthikeyan Piña, Jelani

## 2024-12-06 NOTE — DISCHARGE NOTE PROVIDER - CARE PROVIDERS DIRECT ADDRESSES
,DirectAddress_Unknown,[email or web address]

## 2024-12-06 NOTE — PATIENT PROFILE ADULT - COMPLETE THE FOLLOWING IF THE PATIENT REFUSES THE INFLUENZA VACCINE:
No pertinent past medical history
Risks/benefits discussed with patient/surrogate/Vaccine Information Sheet (VIS) provided-VIS date: 8/6/21

## 2024-12-06 NOTE — CONSULT NOTE ADULT - ASSESSMENT
59y Female with h/o ETOH abuse, anxiety, TBI c/b seizure x 3, was previously on AED but now not on presents with L shoulder pain and limited ROM due to pain after syncopal fall earlier today. States she was sitting at the computer table and  passed out.  She does not remember what happened.  There were workers in the house who heard a thump, and they came down to wake her up-she was out for 3 minutes.  When she came to she states she was confused for at least 10 min, did have urinary incontinence, no tongue bite.  Does not know if she hit her head.  Patient states that the last thing she remembers is getting up from a sitting position to get some coffee. Patient states that she has a history of seizures in the past x 3 after her TBI years ago, but weened off AED and hasn't had a seizure in years.  Over the last 5 months she has had a couple of episodes of metallic taste in her mouth, flashing wavy lines then total body numbness once in the grocery store and once in the bank.  She has been having urinary in continence for 7 months.  She has now a L shoulder fracture, and is having episodes of vomiting while being interviewed.  Neurology is consulted for possible seizure.  CT head is neg for acute findings.      #episode of syncope-cannot r/o seizure    #h/o ETOH abuse-patient with vomiting/resting tremor-cannot r/o ETOH withdrawal          Recommendations  -Monitor on tele  -seizure precautions/aspiration precautions  -Check orthostatics  -IVF if tolerated  -check for underlying metabolic instability/infection, check TSH  -EEG   -MRI brain  -observe for DT's, supplement with thiamine and FA  -DVT prophylaxis  -PT eval  -will follow      D/W Dr. Lomeli, Dr. Florence, patient 59y Female with h/o ETOH abuse, anxiety, TBI c/b seizure x 3, was previously on AED but now not on presents with L shoulder pain and limited ROM due to pain after syncopal fall earlier today. States she was sitting at the computer table and  passed out.  She does not remember what happened.  There were workers in the house who heard a thump, and they came down to wake her up-she was out for 3 minutes.  When she came to she states she was confused for at least 10 min, did have urinary incontinence, no tongue bite.  Does not know if she hit her head.  Patient states that the last thing she remembers is getting up from a sitting position to get some coffee. Patient states that she has a history of seizures in the past x 3 after her TBI years ago, but weened off AED and hasn't had a seizure in years.  Over the last 5 months she has had a couple of episodes of metallic taste in her mouth, flashing wavy lines then total body numbness once in the grocery store and once in the bank.  She has been having urinary in continence for 7 months.  She has now a L shoulder fracture, and is having episodes of vomiting while being interviewed.  Neurology is consulted for possible seizure.  CT head is neg for acute findings.      #episode of syncope-cannot r/o seizure          Recommendations  -Monitor on tele  -seizure precautions/aspiration precautions  -Check orthostatics  -IVF if tolerated  -check for underlying metabolic instability/infection, check TSH  -EEG   -MRI brain  -DVT prophylaxis  -PT eval  -will follow      D/W Dr. Lomeli, Dr. Florence, patient 59 y Female with PMHx of anxiety, TBI 12 years ago, seizure x 3, was previously on AED, was weaned off- not on any AED for years, she presented to ED with C/O L shoulder pain + limited ROM due to pain, patient reported she has a syncopal episode while sitting at the computer table, passed out, does not remember what happened, there were workers in the house who heard a thump, they came down to help her, she was out for 3 minutes, upon coming around, she was confused for at least 10 min, did have urinary incontinence, no tongue bit, possibly hit her head.  Patient states that the last thing she remembers is getting up from a sitting position to get some coffee. Pt does report that over the last 5 months she has had a couple of episodes of metallic taste in her mouth, flashing wavy lines then total body numbness, once occured in the grocery store and once in the bank.  She has been having urinary in continence as well - not associated with these phenomenon. She has now a L shoulder fracture, and is having episodes of vomiting while being interviewed.   CT head is neg for acute findings.      # Episode of syncope and fall from the description - possible seizure    # H/O TBI and 3 seizure episodes - Seizure-free for several years      Recommendations  -Monitor on tele  -seizure precautions/aspiration precautions  -Check orthostatics  -IVF if tolerated  -check for underlying metabolic instability/infection, check TSH  -EEG   -MRI brain  -DVT prophylaxis  -PT eval    Neurology attending  –-------------------------------------  Patient seen and examined, patient very upset regarding her situation/interaction with the nurse who was administering medication. I went 2–3 times, talked to her, she is only c/o pain in the left arm, did not want to pursue EEG, I advised her to consider EEG -would be very beneficial, if abnormal could start AED.    Dr. Quevedo is on service from 12/7/2024, he will follow-up, EEG will be attempted again in a.m. 59 y Female with PMHx of anxiety, TBI 12 years ago, seizure x 3, was previously on AED, was weaned off- not on any AED for years, she presented to ED with C/O L shoulder pain + limited ROM due to pain, patient reported she has a syncopal episode while sitting at the computer table, passed out, does not remember what happened, there were workers in the house who heard a thump, they came down to help her, she was out for 3 minutes, upon coming around, she was confused for at least 10 min, did have urinary incontinence, no tongue bit, possibly hit her head.  Patient states that the last thing she remembers is getting up from a sitting position to get some coffee. Pt does report that over the last 5 months she has had a couple of episodes of metallic taste in her mouth, flashing wavy lines then total body numbness, once occured in the grocery store and once in the bank.  She has been having urinary in continence as well - not associated with these phenomenon. She has now a L shoulder fracture, and is having episodes of vomiting while being interviewed.   CT head is neg for acute findings.      # Episode of syncope and fall from the description - possible seizure    # H/O TBI and 3 seizure episodes - Seizure-free for several years      Recommendations  -Monitor on tele  -seizure precautions/aspiration precautions  -Check orthostatics  -IVF if tolerated  -check for underlying metabolic instability/infection, check TSH  -EEG   -MRI brain  -DVT prophylaxis  -PT eval    Neurology attending  –-------------------------------------  Patient seen and examined, patient very upset regarding situation/interaction with the nurse who was administering medication. I went 2–3 times, talked to her, she is only c/o pain in the left arm, did not want to pursue EEG, I advised her to consider EEG will be beneficial, if abnormal could start AED.    Dr. Quevedo is on service from 12/7/2024, he will follow-up, EEG will be attempted again in a.m.

## 2024-12-06 NOTE — DISCHARGE NOTE PROVIDER - NSDCFUADDAPPT_GEN_ALL_CORE_FT
APPTS ARE READY TO BE MADE: [X] YES    Best Family or Patient Contact (if needed):    Additional Information about above appointments (if needed):    1:  2:  3:  APPTS ARE READY TO BE MADE: [X] YES    Best Family or Patient Contact (if needed):    Additional Information about above appointments (if needed):    1:  2:  3:     Appointment was scheduled in [place]. Barbara Velazquez on 1/7 at 2:30pm [address] [place] [address]

## 2024-12-06 NOTE — DISCHARGE NOTE PROVIDER - NSDCFUSCHEDAPPT_GEN_ALL_CORE_FT
[facility]  ORTHOSURG [address] [facility]3]  Scheduled Appointment: 01/07/2025

## 2024-12-06 NOTE — CONSULT NOTE ADULT - PROBLEM SELECTOR RECOMMENDATION 9
pt presenting with sudden LOC, no prodromal cardiac sx   Tele with no evidence of arrhythmia thus far   neurology following    recommend orthostatic vital signs  echocardiogram ordered  recommend outpatient cardiac monitor

## 2024-12-06 NOTE — PATIENT PROFILE ADULT - FALL HARM RISK - HARM RISK INTERVENTIONS

## 2024-12-06 NOTE — DISCHARGE NOTE PROVIDER - NSDCCPCAREPLAN_GEN_ALL_CORE_FT
PRINCIPAL DISCHARGE DIAGNOSIS  Diagnosis: Syncope  Assessment and Plan of Treatment: You presented to the hospital after a fall. You were found to have a left proximal humerus fracture. You were seen by the Orthopedic Surgery Team. They placed your arm in a sling and recommended close outpatient follow up. They do not recommend surgery at this time. Please follow up with Dr. Monique for outpatient management.  Syncope is the medical term for loss of consciousness. In the event of an unexplained syncopal episode, we recommend performing an EEG to evaluate for seizures and a MRI brain. In addition, we recommend having your heart rhythm recorded using a cardiac monitor. You deferred an inpatient workup and prefer to follow up outpatient. Please see the Neurologist and Cardiologist within 1 week for the workup above.

## 2024-12-06 NOTE — CONSULT NOTE ADULT - SUBJECTIVE AND OBJECTIVE BOX
CC: concern for seizure    HPI:  59y Female with h/o ETOH abuse, anxiety, TBI c/b seizure x 3, was previously on AED but now not on presents with L shoulder pain and limited ROM due to pain after syncopal fall earlier today. States she was sitting at the computer table and  passed out.  She does not remember what happened.  There were workers in the house who heard a thump, and they came down to wake her up-she was out for 3 minutes.  When she came to she states she was confused for at least 10 min, did have urinary incontinence, no tongue bite.  Does not know if she hit her head.  Patient states that the last thing she remembers is getting up from a sitting position to get some coffee. Patient states that she has a history of seizures in the past x 3 after her TBI years ago, but weened off AED and hasn't had a seizure in years.  Over the last 5 months she has had a couple of episodes of metallic taste in her mouth, flashing wavy lines then total body numbness once in the grocery store and once in the bank.  She has been having urinary in continence for 7 months.  She has now a L shoulder fracture, and is having episodes of vomiting while being interviewed.  Neurology is consulted for possible seizure.  CT head is neg for acute findings.    PMH: TBI, Seizures HTN, Anxiety, HLS, PTSD.    PSH: Ovarian cyst removal    FH: CAD, anxiety d/o-sibling, DM    SH: Denies tobacco, + past h/o ETOH abuse but states now rarely drinks alcohol, and denies recreational drug use        PAST MEDICAL & SURGICAL HISTORY:  ETOH abuse      Subdural hematoma      Hypertension      Anxiety      No pertinent past medical history      Alcohol abuse      PTSD (post-traumatic stress disorder)      No significant past surgical history      S/P       S/P tonsillectomy      H/O ovarian cystectomy        MEDICATIONS  (STANDING):  acetaminophen     Tablet .. 1000 milliGRAM(s) Oral every 6 hours  cloNIDine 0.2 milliGRAM(s) Oral daily  folic acid 1 milliGRAM(s) Oral daily  multivitamin 1 Tablet(s) Oral daily  rosuvastatin 20 milliGRAM(s) Oral at bedtime  thiamine 100 milliGRAM(s) Oral daily       Allergies  No Known Allergies        ROS: Pertinent positives in HPI, all other ROS were reviewed and are negative.      Vital Signs Last 24 Hrs  T(C): 36.7 (06 Dec 2024 09:10), Max: 37.1 (05 Dec 2024 21:45)  T(F): 98.1 (06 Dec 2024 09:10), Max: 98.7 (05 Dec 2024 21:45)  HR: 77 (06 Dec 2024 09:10) (77 - 95)  BP: 140/89 (06 Dec 2024 09:10) (118/80 - 153/94)  BP(mean): 90 (06 Dec 2024 03:16) (90 - 113)  RR: 18 (06 Dec 2024 09:10) (16 - 18)  SpO2: 100% (06 Dec 2024 09:10) (97% - 100%)        GEN:   Constitutional: awake, vomited x 1, NAD  HEENT: PERRLA, EOMI,   Neck: Supple.  Extremities:  no edema  Vascular: Caritid Bruit - no  Musculoskeletal: L arm sling  Skin: No rashes    Neurological exam:  HF: A x O x 3. Appropriately interactive, normal affect. Speech fluent, No Aphasia or paraphasic errors. Naming /repetition intact   CN: DENIS, EOMI, VFF, facial sensation normal, no NLFD, tongue midline, Palate moves equally, SCM equal bilaterally  Motor: No pronator drift, Strength 5/5 in all 3 EXT, LUE in sling, normal bulk and tone, +resting tremor RUE  Sens: Intact to light touch  Reflexes: Symmetric and normal, downgoing toes b/l  Coord:  No FNFA, dysmetria, LUE in sling cannot test  Gait/Balance: Cannot test          Labs:       136  |  105  |  14  ----------------------------<  127[H]  3.9   |  28  |  0.67    Ca    9.0      06 Dec 2024 08:23  Phos  3.3     12-06  Mg     2.5     12-    TPro  7.4  /  Alb  3.8  /  TBili  0.5  /  DBili  x   /  AST  13[L]  /  ALT  18  /  AlkPhos  133[H]  12-06    ETOH <10                          12.4   6.27  )-----------( 232      ( 06 Dec 2024 08:23 )             36.6       Radiology:    < from: CT Head No Cont (12.05.24 @ 16:20) >  IMPRESSION:  No evidence of an acute traumatic intracranial injury.               CC: Concern for seizure    HPI:  59 y Female with PMHx of anxiety, TBI, seizure x 3, was previously on AED but not on any AED for a long time presented to ED with C/O L shoulder pain + limited ROM due to pain, patient reported she has a syncopal episode while sitting at the computer table and passed out, does not remember what happened, there were workers in the house who heard a thump, and they came down to help her, she was out for 3 minutes.  When she came around, she was confused for at least 10 min, did have urinary incontinence, no tongue bite.  Does not know if she hit her head.  Patient states that the last thing she remembers is getting up from a sitting position to get some coffee. Patient states that she has a history of seizures in the past x 3 after her TBI 12 years ago, but was weened off AED and hasn't had a seizure in years.  Over the last 5 months she has had a couple of episodes of metallic taste in her mouth, flashing wavy lines then total body numbness once in the grocery store and once in the bank.  She has been having urinary in continence for 7 months.  She has now a L shoulder fracture, and is having episodes of vomiting while being interviewed.  Neurology is consulted for possible seizure.  CT head is neg for acute findings.        PAST MEDICAL & SURGICAL HISTORY:  Subdural hematoma  Hypertension  Anxiety  Alcohol abuse  PTSD (post-traumatic stress disorder)  S/P   S/P tonsillectomy  H/O ovarian cystectomy      FH:   CAD, anxiety d/o-sibling,   DM      SH: Denies tobacco, + past h/o ETOH abuse but states now rarely drinks alcohol, denies recreational drug use          MEDICATIONS  (STANDING):  acetaminophen     Tablet .. 1000 milliGRAM(s) Oral every 6 hours  cloNIDine 0.2 milliGRAM(s) Oral daily  folic acid 1 milliGRAM(s) Oral daily  multivitamin 1 Tablet(s) Oral daily  rosuvastatin 20 milliGRAM(s) Oral at bedtime  thiamine 100 milliGRAM(s) Oral daily       Allergies  No Known Allergies        ROS: Pertinent positives in HPI, all other ROS were reviewed and are negative.        Vital Signs Last 24 Hrs  T(C): 36.7 (06 Dec 2024 09:10), Max: 37.1 (05 Dec 2024 21:45)  T(F): 98.1 (06 Dec 2024 09:10), Max: 98.7 (05 Dec 2024 21:45)  HR: 77 (06 Dec 2024 09:10) (77 - 95)  BP: 140/89 (06 Dec 2024 09:10) (118/80 - 153/94)  BP(mean): 90 (06 Dec 2024 03:16) (90 - 113)  RR: 18 (06 Dec 2024 09:10) (16 - 18)  SpO2: 100% (06 Dec 2024 09:10) (97% - 100%)        GEN:   Constitutional: awake, in pain, vomited x 1  HEENT: PERRLA, EOMI,   Neck: Supple.  Extremities:  no edema  Vascular: Caritid Bruit - no  Musculoskeletal: L arm sling  Skin: No rashes    Neurological exam:  HF: A x O x 3. Appropriately interactive, normal affect. Speech fluent, No Aphasia or paraphasic errors. Naming /repetition intact   CN: DENIS, EOMI, VFF, facial sensation normal, no NLFD, tongue midline, Palate moves equally, SCM equal bilaterally  Motor: No pronator drift, Strength 5/5 in all 3 EXT, LUE in sling, normal bulk and tone, +resting tremor RUE  Sens: Intact to light touch  Reflexes: Symmetric and normal, downgoing toes b/l  Coord:  No FNFA, dysmetria, LUE in sling cannot test  Gait/Balance: Cannot test      Labs:       136  |  105  |  14  ----------------------------<  127[H]  3.9   |  28  |  0.67    Ca    9.0      06 Dec 2024 08:23  Phos  3.3     12-  Mg     2.5     12-    TPro  7.4  /  Alb  3.8  /  TBili  0.5  /  DBili  x   /  AST  13[L]  /  ALT  18  /  AlkPhos  133[H]  12-06    ETOH <10                          12.4   6.27  )-----------( 232      ( 06 Dec 2024 08:23 )             36.6       Radiology:    < from: CT Head No Cont (24 @ 16:20) >  IMPRESSION:  No evidence of an acute traumatic intracranial injury.

## 2024-12-06 NOTE — CONSULT NOTE ADULT - SUBJECTIVE AND OBJECTIVE BOX
CHIEF COMPLAINT: syncope     HPI:  Ms. Padilla is a 60 yo female with a hx HTN, HLD, anxiety, TBI, epilepsy presents after an episode of syncope.     She reports being in her usual state of health- was drinking coffee and got up from her seat when she suddenly developed LOC. Denies prodromal dizziness, chest pain, SOB. Reports hx of seizures but none in many years.     She has nocardiac hx.   Cardiology consultd for possible cardiac cause of syncope.     Hs trop neg x1.   ECG and tele with no evidence of arrhythmia       PAST MEDICAL / SURGICAL HISTORY:  PAST MEDICAL & SURGICAL HISTORY:  ETOH abuse      Subdural hematoma      Hypertension      Anxiety      No pertinent past medical history      Alcohol abuse      PTSD (post-traumatic stress disorder)      No significant past surgical history      S/P       S/P tonsillectomy      H/O ovarian cystectomy          SOCIAL HISTORY:   Alcohol: Denied  Smoking: Nonsmoker  Drug Use: Denied  Marital Status:     FAMILY HISTORY: FAMILY HISTORY:  Family history of anxiety disorder (Sibling)    Family history of diabetes mellitus (DM)        MEDICATIONS  (STANDING):  acetaminophen     Tablet .. 1000 milliGRAM(s) Oral every 6 hours  cloNIDine 0.2 milliGRAM(s) Oral daily  folic acid 1 milliGRAM(s) Oral daily  multivitamin 1 Tablet(s) Oral daily  rosuvastatin 20 milliGRAM(s) Oral at bedtime  thiamine 100 milliGRAM(s) Oral daily    MEDICATIONS  (PRN):  aluminum hydroxide/magnesium hydroxide/simethicone Suspension 30 milliLiter(s) Oral every 4 hours PRN Dyspepsia  diphenhydrAMINE 25 milliGRAM(s) Oral every 4 hours PRN Rash and/or Itching  LORazepam   Injectable 2 milliGRAM(s) IV Push every 1 hour PRN Symptom-triggered: each CIWA -Ar score 8 or GREATER  melatonin 3 milliGRAM(s) Oral at bedtime PRN Insomnia  ondansetron   Disintegrating Tablet 4 milliGRAM(s) Oral every 6 hours PRN Nausea and/or Vomiting  ondansetron Injectable 4 milliGRAM(s) IV Push every 8 hours PRN Nausea and/or Vomiting  oxyCODONE    IR 2.5 milliGRAM(s) Oral every 4 hours PRN Moderate Pain (4 - 6)  oxyCODONE    IR 5 milliGRAM(s) Oral every 4 hours PRN Severe Pain (7 - 10)      Allergies    No Known Allergies    Intolerances        REVIEW OF SYSTEMS:  CONSTITUTIONAL: No weakness, fevers or chills  Eyes: No visual changes  NECK: No pain or stiffness  RESPIRATORY: No cough, wheezing, hemoptysis; No shortness of breath  CARDIOVASCULAR: No chest pain or palpitations  GASTROINTESTINAL: No abdominal pain. No nausea, vomiting, or hematemesis; No diarrhea or constipation. No melena or hematochezia.  GENITOURINARY: No dysuria, frequency or hematuria  NEUROLOGICAL: No numbness. +syncope   SKIN: No itching or rash  All other review of systems is negative unless indicated above    VITAL SIGNS:   Vital Signs Last 24 Hrs  T(C): 36.7 (06 Dec 2024 09:10), Max: 37.1 (05 Dec 2024 21:45)  T(F): 98.1 (06 Dec 2024 09:10), Max: 98.7 (05 Dec 2024 21:45)  HR: 77 (06 Dec 2024 09:10) (77 - 95)  BP: 140/89 (06 Dec 2024 09:10) (118/80 - 153/94)  BP(mean): 90 (06 Dec 2024 03:16) (90 - 113)  RR: 18 (06 Dec 2024 09:10) (16 - 18)  SpO2: 100% (06 Dec 2024 09:10) (97% - 100%)    Parameters below as of 06 Dec 2024 09:10  Patient On (Oxygen Delivery Method): room air        I&O's Summary      PHYSICAL EXAM:  Constitutional: NAD, awake and alert  HEENT:  EOMI,  Pupils round, No oral cyanosis.  Pulmonary: Non-labored, breath sounds are clear bilaterally, No wheezing, rales or rhonchi  Cardiovascular: S1 and S2, tachycardic, no murmurs  Gastrointestinal: Bowel Sounds present, soft, nontender.   Lymph: No peripheral edema. No cervical lymphadenopathy.  Neurological: Alert, no focal deficits  Skin: No rashes.  Psych:  Mood & affect appropriate    LABS:                        12.4   6.27  )-----------( 232      ( 06 Dec 2024 08:23 )             36.6                         12.3   8.12  )-----------( 280      ( 05 Dec 2024 16:38 )             36.7     06 Dec 2024 08:23    136    |  105    |  14     ----------------------------<  127    3.9     |  28     |  0.67   05 Dec 2024 16:38    135    |  106    |  15     ----------------------------<  99     3.9     |  25     |  0.62     Ca    9.0        06 Dec 2024 08:23  Ca    9.2        05 Dec 2024 16:38  Phos  3.3       06 Dec 2024 08:23  Mg     2.5       06 Dec 2024 08:23    TPro  7.4    /  Alb  3.8    /  TBili  0.5    /  DBili  x      /  AST  13     /  ALT  18     /  AlkPhos  133    06 Dec 2024 08:23  TPro  7.7    /  Alb  3.9    /  TBili  0.4    /  DBili  x      /  AST  17     /  ALT  18     /  AlkPhos  125    05 Dec 2024 16:38    PT/INR - ( 05 Dec 2024 16:38 )   PT: 11.0 sec;   INR: 0.96 ratio         PTT - ( 05 Dec 2024 16:38 )  PTT:30.8 sec

## 2024-12-06 NOTE — DISCHARGE NOTE PROVIDER - HOSPITAL COURSE
Admission HPI: 59y Female presents with L shoulder pain and limited ROM due to pain after syncopal fall earlier today. States she passed out and does not remember what happened. States she was confused for at least 10 min after waking. Denies any numbness/tingling in the affected extremity. Does not know if she hit her head. Denies any other orthopedic injuries at this time. Patient is R hand dominant. Patient ambulates without assistance at baseline. Denies fever, chills, HA, dizziness, chest pain, SOB, N/V/D, urinary frequency, urgency, or incontinence. Patient states that the last thing she remembers is getting up from a sitting position to get some coffee. Patient states that she has a history of seizures in the past, but hasn't had a seizure in years.   PMH: TBI, Epilepsy, HTN, Anxiety, and HLD  PSH: Ovarian cyst removal  Meds: Reviewed  Allergies: NKDA  FH: CAD  SH: No tobacco, rarely drinks alcohol, and no recreational drug use     Patient found to have L Proximal humerus Fx -- seen by Orthopedic Team. NWB LUE in shoulder sling, gentle ROM of elbow and wrist.   Per ortho, no acute orthopaedic surgical intervention indicated at this time and stable for discharge.  Patient admitted to medicine service for syncope workup given fall. TTE, MRI, EEG, orthostatics all ordered. TTE completed -- prelim results normal EF, no acute findings, mild valve disease. Patient adamantly refusing MRI, EEG and orthostatics. Understands the risks of refusing the workup at this time. There's a chance of repeat syncopal episodes, which can lead to LOC/fall and possible death. Patient understands but wishes to proceed with the workup outpatient. Multiple RN present and witnessed our conversations.     Patient was discharged to: Home    Physical exam at the time of discharge:  LOS: 1d    VITALS:   T(C): 36.7 (12-06-24 @ 09:10), Max: 37.1 (12-05-24 @ 21:45)  HR: 77 (12-06-24 @ 09:10) (77 - 95)  BP: 140/89 (12-06-24 @ 09:10) (118/80 - 140/89)  RR: 18 (12-06-24 @ 09:10) (16 - 18)  SpO2: 100% (12-06-24 @ 09:10) (97% - 100%)    PHYSICAL EXAM:  GENERAL: Anxious, non-cooperative, non-toxic   HEAD:  Atraumatic, Normocephalic  EYES: EOMI, PERRLA, conjunctiva and sclera clear  ENMT: No tonsillar erythema, exudates, or enlargement; Moist mucous membranes  NECK: Supple, No JVD  HEART: Regular rate and rhythm; No murmurs, rubs, or gallops  RESPIRATORY: Unlabored respirations. CTA B/L, No W/R/R  ABDOMEN: Soft, Nontender, Nondistended; Bowel sounds present  MSK: LUE in sling   NEUROLOGY: A&Ox3  EXTREMITIES:  2+ Peripheral Pulses, No clubbing, cyanosis, or edema         Admission HPI: 59y Female presents with L shoulder pain and limited ROM due to pain after syncopal fall earlier today. States she passed out and does not remember what happened. States she was confused for at least 10 min after waking. Denies any numbness/tingling in the affected extremity. Does not know if she hit her head. Denies any other orthopedic injuries at this time. Patient is R hand dominant. Patient ambulates without assistance at baseline. Denies fever, chills, HA, dizziness, chest pain, SOB, N/V/D, urinary frequency, urgency, or incontinence. Patient states that the last thing she remembers is getting up from a sitting position to get some coffee. Patient states that she has a history of seizures in the past, but hasn't had a seizure in years.   PMH: TBI, Epilepsy, HTN, Anxiety, and HLD  PSH: Ovarian cyst removal  Meds: Reviewed  Allergies: NKDA  FH: CAD  SH: No tobacco, rarely drinks alcohol, and no recreational drug use     Patient found to have L Proximal humerus Fx -- seen by Orthopedic Team. NWB LUE in shoulder sling, gentle ROM of elbow and wrist.   Per ortho, no acute orthopaedic surgical intervention indicated at this time and stable for discharge.  Patient admitted to medicine service for syncope workup given fall. TTE, MRI, EEG, orthostatics all ordered. TTE completed -- prelim results normal EF, no acute findings, mild valve disease. Patient adamantly refusing MRI, EEG and orthostatics. Understands the risks of refusing the workup at this time. There's a chance of repeat syncopal episodes, which can lead to LOC/fall and possible death. Patient understands and has capacity. Patient disruptive to staff throughout the day by refusing care AND interrupting patient care by yelling at the nurses station constantly. Also being manipulative by accusing staff and Physicians of not providing certain treatments, but then immediately refusing that same treatment.        Admission HPI: 59y Female presents with L shoulder pain and limited ROM due to pain after syncopal fall earlier today. States she passed out and does not remember what happened. States she was confused for at least 10 min after waking. Denies any numbness/tingling in the affected extremity. Does not know if she hit her head. Denies any other orthopedic injuries at this time. Patient is R hand dominant. Patient ambulates without assistance at baseline. Denies fever, chills, HA, dizziness, chest pain, SOB, N/V/D, urinary frequency, urgency, or incontinence. Patient states that the last thing she remembers is getting up from a sitting position to get some coffee. Patient states that she has a history of seizures in the past, but hasn't had a seizure in years.   PMH: TBI, Epilepsy, HTN, Anxiety, and HLD  PSH: Ovarian cyst removal  Meds: Reviewed  Allergies: NKDA  FH: CAD  SH: No tobacco, rarely drinks alcohol, and no recreational drug use     Patient found to have L Proximal humerus Fx -- seen by Orthopedic Team. NWB LUE in shoulder sling, gentle ROM of elbow and wrist.   Per ortho, no acute orthopaedic surgical intervention indicated at this time and stable for discharge.  Patient admitted to medicine service for syncope workup given fall.

## 2024-12-06 NOTE — DISCHARGE NOTE PROVIDER - NSDCMRMEDTOKEN_GEN_ALL_CORE_FT
aspirin 325 mg oral tablet: 1 tab(s) orally once a day  clonazePAM 0.5 mg oral tablet: 1 tab(s) orally once a day as needed for  cloNIDine 0.2 mg oral tablet: 1 tab(s) orally once a day  rosuvastatin 20 mg oral tablet: 1 tab(s) orally once a day (at bedtime)

## 2024-12-06 NOTE — ED ADULT NURSE REASSESSMENT NOTE - NS ED NURSE REASSESS COMMENT FT1
pt about to be transported to [facility] when she started vomiting and endorsing anxiety and SOB. pt vital signs stable, md made aware, pt to remain in ED for md to assess bedside. [facility] RN also made aware of situation and that pt will be coming up to them shortly. pt about to be transported to [facility] when she started vomiting and endorsing anxiety and SOB. pt also brought up concerns over memory loss and urinary incontinence. pt vital signs stable, md made aware, pt to remain in ED for md to assess bedside. [facility] RN also made aware of situation and that pt will be coming up to them shortly.

## 2024-12-07 LAB
T3 SERPL-MCNC: 132 NG/DL — SIGNIFICANT CHANGE UP (ref 80–200)
T4 AB SER-ACNC: 7.8 UG/DL — SIGNIFICANT CHANGE UP (ref 4.6–12)

## 2024-12-07 PROCEDURE — 99233 SBSQ HOSP IP/OBS HIGH 50: CPT

## 2024-12-07 PROCEDURE — 70551 MRI BRAIN STEM W/O DYE: CPT | Mod: 26

## 2024-12-07 RX ORDER — HYDROMORPHONE HYDROCHLORIDE 2 MG/1
2 TABLET ORAL EVERY 6 HOURS
Refills: 0 | Status: DISCONTINUED | OUTPATIENT
Start: 2024-12-07 | End: 2024-12-08

## 2024-12-07 RX ADMIN — HYDROMORPHONE HYDROCHLORIDE 2 MILLIGRAM(S): 2 TABLET ORAL at 23:50

## 2024-12-07 RX ADMIN — CLONIDINE HYDROCHLORIDE 0.2 MILLIGRAM(S): 0.3 TABLET ORAL at 09:55

## 2024-12-07 RX ADMIN — HYDROMORPHONE HYDROCHLORIDE 2 MILLIGRAM(S): 2 TABLET ORAL at 17:50

## 2024-12-07 RX ADMIN — ACETAMINOPHEN 500MG 1000 MILLIGRAM(S): 500 TABLET, COATED ORAL at 00:25

## 2024-12-07 RX ADMIN — HYDROMORPHONE HYDROCHLORIDE 2 MILLIGRAM(S): 2 TABLET ORAL at 10:17

## 2024-12-07 RX ADMIN — ACETAMINOPHEN, DIPHENHYDRAMINE HCL, PHENYLEPHRINE HCL 3 MILLIGRAM(S): 325; 25; 5 TABLET ORAL at 23:50

## 2024-12-07 RX ADMIN — HYDROMORPHONE HYDROCHLORIDE 0.2 MILLIGRAM(S): 2 TABLET ORAL at 20:50

## 2024-12-07 RX ADMIN — HYDROMORPHONE HYDROCHLORIDE 0.2 MILLIGRAM(S): 2 TABLET ORAL at 13:37

## 2024-12-07 RX ADMIN — ROSUVASTATIN CALCIUM 20 MILLIGRAM(S): 5 TABLET, FILM COATED ORAL at 23:50

## 2024-12-07 RX ADMIN — HYDROMORPHONE HYDROCHLORIDE 0.2 MILLIGRAM(S): 2 TABLET ORAL at 07:33

## 2024-12-07 RX ADMIN — OXYCODONE HYDROCHLORIDE 2.5 MILLIGRAM(S): 30 TABLET ORAL at 05:26

## 2024-12-07 RX ADMIN — HYDROMORPHONE HYDROCHLORIDE 0.2 MILLIGRAM(S): 2 TABLET ORAL at 19:54

## 2024-12-07 RX ADMIN — CLONAZEPAM 0.5 MILLIGRAM(S): 0.12 TABLET, ORALLY DISINTEGRATING ORAL at 19:54

## 2024-12-07 RX ADMIN — ACETAMINOPHEN 500MG 1000 MILLIGRAM(S): 500 TABLET, COATED ORAL at 05:26

## 2024-12-07 RX ADMIN — Medication 25 MILLIGRAM(S): at 19:54

## 2024-12-07 RX ADMIN — ACETAMINOPHEN 500MG 1000 MILLIGRAM(S): 500 TABLET, COATED ORAL at 06:20

## 2024-12-07 RX ADMIN — ACETAMINOPHEN 500MG 1000 MILLIGRAM(S): 500 TABLET, COATED ORAL at 17:48

## 2024-12-07 RX ADMIN — HYDROMORPHONE HYDROCHLORIDE 0.5 MILLIGRAM(S): 2 TABLET ORAL at 00:25

## 2024-12-07 RX ADMIN — ACETAMINOPHEN 500MG 1000 MILLIGRAM(S): 500 TABLET, COATED ORAL at 12:40

## 2024-12-07 RX ADMIN — OXYCODONE HYDROCHLORIDE 2.5 MILLIGRAM(S): 30 TABLET ORAL at 06:20

## 2024-12-07 NOTE — PROGRESS NOTE ADULT - PROBLEM SELECTOR PLAN 1
pt presenting with sudden LOC, no prodromal cardiac sx   Tele with no evidence of arrhythmia thus far   neurology following    have ordered orthostatic vital signs  echocardiogram shows NL LV SYS FX EF 60-65%  recommend outpatient cardiac monitor consider ILR pt presenting with sudden LOC, no prodromal cardiac sx   Tele with no evidence of arrhythmia thus far   neurology following    have ordered orthostatic vital signs  echocardiogram shows NL LV SYS FX EF 60-65%  Patient states she has experienced prior similar episodes, I have recommended ILR  Will consult EP

## 2024-12-08 VITALS — RESPIRATION RATE: 18 BRPM | TEMPERATURE: 99 F | OXYGEN SATURATION: 99 %

## 2024-12-08 PROCEDURE — 99254 IP/OBS CNSLTJ NEW/EST MOD 60: CPT

## 2024-12-08 PROCEDURE — 99233 SBSQ HOSP IP/OBS HIGH 50: CPT

## 2024-12-08 RX ORDER — SODIUM CHLORIDE 9 MG/ML
1000 INJECTION, SOLUTION INTRAMUSCULAR; INTRAVENOUS; SUBCUTANEOUS
Refills: 0 | Status: DISCONTINUED | OUTPATIENT
Start: 2024-12-08 | End: 2024-12-08

## 2024-12-08 RX ORDER — HYDROMORPHONE HYDROCHLORIDE 2 MG/1
0.2 TABLET ORAL ONCE
Refills: 0 | Status: DISCONTINUED | OUTPATIENT
Start: 2024-12-08 | End: 2024-12-08

## 2024-12-08 RX ADMIN — CLONAZEPAM 0.5 MILLIGRAM(S): 0.12 TABLET, ORALLY DISINTEGRATING ORAL at 10:54

## 2024-12-08 RX ADMIN — HYDROMORPHONE HYDROCHLORIDE 2 MILLIGRAM(S): 2 TABLET ORAL at 15:12

## 2024-12-08 RX ADMIN — HYDROMORPHONE HYDROCHLORIDE 2 MILLIGRAM(S): 2 TABLET ORAL at 06:56

## 2024-12-08 RX ADMIN — CLONIDINE HYDROCHLORIDE 0.2 MILLIGRAM(S): 0.3 TABLET ORAL at 10:43

## 2024-12-08 RX ADMIN — Medication 1 TABLET(S): at 10:41

## 2024-12-08 RX ADMIN — HYDROMORPHONE HYDROCHLORIDE 2 MILLIGRAM(S): 2 TABLET ORAL at 06:07

## 2024-12-08 RX ADMIN — HYDROMORPHONE HYDROCHLORIDE 0.2 MILLIGRAM(S): 2 TABLET ORAL at 10:38

## 2024-12-08 RX ADMIN — HYDROMORPHONE HYDROCHLORIDE 0.2 MILLIGRAM(S): 2 TABLET ORAL at 04:09

## 2024-12-08 RX ADMIN — ACETAMINOPHEN 500MG 1000 MILLIGRAM(S): 500 TABLET, COATED ORAL at 00:44

## 2024-12-08 RX ADMIN — Medication 81 MILLIGRAM(S): at 10:41

## 2024-12-08 RX ADMIN — HYDROMORPHONE HYDROCHLORIDE 0.2 MILLIGRAM(S): 2 TABLET ORAL at 11:04

## 2024-12-08 RX ADMIN — Medication 1 MILLIGRAM(S): at 10:41

## 2024-12-08 RX ADMIN — ACETAMINOPHEN 500MG 1000 MILLIGRAM(S): 500 TABLET, COATED ORAL at 00:48

## 2024-12-08 RX ADMIN — HYDROMORPHONE HYDROCHLORIDE 0.2 MILLIGRAM(S): 2 TABLET ORAL at 05:08

## 2024-12-08 RX ADMIN — ACETAMINOPHEN 500MG 1000 MILLIGRAM(S): 500 TABLET, COATED ORAL at 06:07

## 2024-12-08 RX ADMIN — HYDROMORPHONE HYDROCHLORIDE 2 MILLIGRAM(S): 2 TABLET ORAL at 00:48

## 2024-12-08 RX ADMIN — ACETAMINOPHEN 500MG 1000 MILLIGRAM(S): 500 TABLET, COATED ORAL at 06:56

## 2024-12-08 RX ADMIN — Medication 100 MILLIGRAM(S): at 10:42

## 2024-12-08 NOTE — PROGRESS NOTE ADULT - PROBLEM SELECTOR PLAN 1
pt presenting with sudden LOC, no prodromal cardiac sx, patient states over the past 7 months she has experienced occasional pre syncopal episodes with no LOC, she also reports FH of early CAD father MI in his 40's and her daughter having h/o SVT s/p ablation, Tele with no evidence of arrhythmia thus far, ILR recommended   echocardiogram shows NL LV SYS FX EF 60-65%  Troponin negative denies chest pain, have advised OPT Ischemic work up ( CTA Coronary arteries )    + orthostatics supine to standing -116, encourage hydration and suggest IVF, LE support stocking, abd binder and avoid abrupt change in position

## 2024-12-08 NOTE — EEG REPORT - NS EEG TEXT BOX
[facility]   COMPREHENSIVE EPILEPSY CENTER   REPORT OF ROUTINE VIDEO EEG     [facility]: [address], [place] [address], Ph#: [phone]  [facility]: [address], [place] [address], Ph#: [phone]  Office: [address] [place] [address], [facility] [address], [place] [address] Ph#: [phone]    Patient Name: SUDHEER SANDOVAL  Age and : 59y (63)  MRN #: [id number]  Location: [place] [address]  Referring Physician: Kalpana Florence    Study Date: 24    _____________________________________________________________  TECHNICAL INFORMATION    Placement and Labeling of Electrodes:  The EEG was performed utilizing 20 channels referential EEG connections (coronal over temporal over parasagittal montage) using all standard 10-20 electrode placements with EKG.  Recording was at a sampling rate of 256 samples per second per channel.  Time synchronized digital video recording was done simultaneously with EEG recording.  A low light infrared camera was used for low light recording.  Pablito and seizure detection algorithms were utilized.    _____________________________________________________________  HISTORY    Patient is a 59y old  Female who presents with a chief complaint of Syncope (08 Dec 2024 11:36)      PERTINENT MEDICATION:  MEDICATIONS  (STANDING):    _____________________________________________________________  STUDY INTERPRETATION    Findings: The background was continuous, spontaneously variable and reactive. During wakefulness, the posterior dominant rhythm consisted of symmetric, well-modulated 9 Hz activity, with amplitude to 30 uV, that attenuated to eye opening.  Low amplitude frontal beta was noted in wakefulness.    Background Slowing:  No generalized background slowing was present.    Focal Slowing:   None was present.    Sleep Background:  Stage II sleep transients were not recorded.  Drowsiness and stage II sleep transients were not recorded.    Other Non-Epileptiform Findings:  None were present.    Interictal Epileptiform Activity:   None were present.    Events:  Clinical events: None recorded.  Seizures: None recorded.    Activation Procedures:   Hyperventilation was performed and did not elicit any abnormality.  Photic stimulation was performed and did not elicit any abnormality.     Artifacts:  Intermittent myogenic and movement artifacts were noted.    ECG:  The heart rate on single channel ECG was predominantly between 60-80 BPM.    _____________________________________________________________  EEG SUMMARY/CLASSIFICATION    Normal EEG in the awake state.  _____________________________________________________________  EEG IMPRESSION/CLINICAL CORRELATE    Normal awake EEG study.  Sleep was not recorded.    Montez Stearns MD  Neurology Attending Physician

## 2024-12-08 NOTE — CHART NOTE - NSCHARTNOTEFT_GEN_A_CORE
Patient left AMA yesterday evening without signing AMA form. Patient had capacity to leave and understands risks.

## 2024-12-08 NOTE — CONSULT NOTE ADULT - ASSESSMENT
A 60yo Female presents with L shoulder pain and limited ROM due to pain after syncopal fall earlier today. ( no prior events) States she passed out and does not remember what happened. States she was confused for at least 10 min after waking. now on tele s/p traumatic syncopal event  1) Continue tele monitoring  2) TTE with Normal LV function unremarkable  3) DIscussed lifestyle modifications with pt/ adequate hydration  4) Pt is agreeable to ILR insertion  5) plan for ILR insertion in am with EP team / pt does not need to be NPO or withhold any medications A 60yo Female presents with L shoulder pain and limited ROM due to pain after syncopal fall earlier today. ( no prior events) States she passed out and does not remember what happened. States she was confused for at least 10 min after waking. now on tele s/p traumatic syncopal event  1) Continue tele monitoring  2) TTE with Normal LV function unremarkable  3) Discussed lifestyle modifications with pt/ adequate hydration/ avoid dehydration triggers  4) Pt is agreeable to ILR insertion  5) plan for ILR insertion in am with EP team / pt does not need to be NPO or withhold any medications

## 2024-12-08 NOTE — CONSULT NOTE ADULT - CONSULT REQUESTED DATE/TIME
05-Dec-2024 19:55
06-Dec-2024 12:04
Last office Visit:08/16/2023  Next Appt:02/20/2024    Received request via: Pharmacy    Was the patient seen in the last year in this department? Yes    Does the patient have an active prescription (recently filled or refills available) for medication(s) requested? No    [facility]     Does the patient have group home Plus and need 100 day supply (blood pressure, diabetes and cholesterol meds only)? Patient does not have SCP    
08-Dec-2024 11:36
06-Dec-2024 10:56

## 2024-12-08 NOTE — CONSULT NOTE ADULT - SUBJECTIVE AND OBJECTIVE BOX
ELECTROPHYSIOLOGY CONSULTATION NOTE                                                                                             Consult requested by:  Dr Blake Cardiology  Reason for Consultation: Syncope/ ILR insertion   History obtained by: Patient and medical record   obtained: No    Chief complaint:    Patient is a 59y old  Female who presents with a chief complaint of syncope (07 Dec 2024 11:27)      HPI:  59y Female presents with L shoulder pain and limited ROM due to pain after syncopal fall earlier today. States she passed out and does not remember what happened. States she was confused for at least 10 min after waking. Denies any numbness/tingling in the affected extremity. Does not know if she hit her head. Denies any other orthopedic injuries at this time. Patient is R hand dominant. Patient ambulates without assistance at baseline. Denies fever, chills, HA, dizziness, chest pain, SOB, N/V/D, urinary frequency, urgency, or incontinence. Patient states that the last thing she remembers is getting up from a sitting position to get some coffee. Patient states that she has a history of seizures in the past, but hasn't had a seizure in years.     PMH: TBI, Epilepsy, HTN, Anxiety, and HLD  PSH: Ovarian cyst removal  Meds: Reviewed  Allergies: NKDA  FH: CAD  SH: No tobacco, rarely drinks alcohol, and no recreational drug use  (05 Dec 2024 20:47)        REVIEW OF SYMPTOMS:     CONSTITUTIONAL: No fever, weight loss, or fatigue  ENMT:  No difficulty hearing, tinnitus, vertigo; No sinus or throat pain  NECK: No pain or stiffness  CARDIOVASCULAR: No chest pain, dyspnea, syncope, palpitations, dizziness, Orthopnea, Paroxsymal nocturnal dyspnea  RESPIRATORY: No Dyspnea on exertion, Shortness of breath, cough, wheezing  : No dysuria, no hematuria   GI: No dark color stool, no melena, no diarrhea, no constipation, no abdominal pain   NEURO: No headache, no dizziness, no slurred speech   MUSCULOSKELETAL: No joint pain or swelling; No muscle, back, or extremity pain  PSYCH: No agitation, no anxiety.    ALL OTHER REVIEW OF SYSTEMS ARE NEGATIVE.      PREVIOUS DIAGNOSTIC TESTING  ECHO FINDINGS:  TRANSTHORACIC ECHOCARDIOGRAM REPORT  ________________________________________________________________________________                                      _______       Pt. Name:       SUDHEER SANDOVAL Study Date:    2024  MRN:            [id number]           YOB: 1965  Accession #:    [id number]          Age:           59 years  Account#:       [id number]       Gender:        F  Heart Rate:                        Height:        62.00 in (157.48 cm)  Rhythm:           Weight:        151.00 lb (68.49 kg)  Blood Pressure: 135/56 mmHg        BSA/BMI:       1.70 m² / 27.62 kg/m²  ________________________________________________________________________________________  Referring Physician:    [id number] Kalpana Florence  Interpreting Physician: Venugopal Palla MD  Primary Sonographer:    Anum Desai [facility]    CPT:               ECHO TTE WO CON COMP W DOPP - 35817.m  Indication(s):     Syncope and collapse - R55  Procedure:         Transthoracic echocardiogram with 2-D, M-mode and complete                     spectral and color flow Doppler.  Ordering Location:   Admission Status:  Inpatient  Study Information: Image quality for this study is technically difficult.    _______________________________________________________________________________________     CONCLUSIONS:      1. Left ventricular cavity is normal in size. Left ventricular wall thickness is normal. Left ventricular systolic function is normal with an ejection fraction visually estimated at 60 to 65 %.   2. Normal left ventricular diastolic function.   3. Normal right ventricular cavity size and normal right ventricular systolic function.   4. Normal left and right atrial size.   5. Mild mitral regurgitation.   6. Mild tricuspid regurgitation.   7. Estimated pulmonary artery systolic pressure is 20 mmHg, consistent with normal pulmonary artery pressure.   8. Technically difficult image quality.    ________________________________________________________________________________________        ALLERGIES: Allergies    No Known Allergies    Intolerances          PAST MEDICAL HISTORY  ETOH abuse    Subdural hematoma    Hypertension    Anxiety    No pertinent past medical history    Alcohol abuse    PTSD (post-traumatic stress disorder)        PAST SURGICAL HISTORY  No significant past surgical history    No significant past surgical history    S/P     S/P tonsillectomy    H/O ovarian cystectomy        FAMILY HISTORY:  Family history of anxiety disorder (Sibling)    Family history of diabetes mellitus (DM)        SOCIAL HISTORY:  Denies smoking/alcohol/drugs  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  aspirin enteric coated  folic acid  multivitamin  rosuvastatin  thiamine          Vital Signs Last 24 Hrs  T(C): 37.4 (08 Dec 2024 11:32), Max: 37.4 (08 Dec 2024 11:32)  T(F): 99.4 (08 Dec 2024 11:32), Max: 99.4 (08 Dec 2024 11:32)  HR: 76 (08 Dec 2024 04:46) (76 - 99)  BP: 118/82 (08 Dec 2024 04:46) (107/72 - 118/93)  BP(mean): 84 (08 Dec 2024 04:15) (84 - 84)  RR: 18 (08 Dec 2024 11:32) (18 - 18)  SpO2: 99% (08 Dec 2024 11:32) (93% - 99%)    Parameters below as of 08 Dec 2024 11:32  Patient On (Oxygen Delivery Method): room air          PHYSICAL EXAM:  Constitutional: Comfortable . No acute distress.   HEENT: Atraumatic and normocephalic , neck is supple . no JVD. No carotid bruit. PEERL   CNS: A&Ox3. No focal deficits. EOMI. Cranial nerves II-IX are intact.   Lymph Nodes: Cervical : Not palpable.  Respiratory: CTAB  Cardiovascular: S1S2 RRR. No murmur/rubs or gallop.  Gastrointestinal: Soft non-tender and non distended . +Bowel sounds. negative Norton's sign.  Extremities: No edema. Left arm sling in place  Psychiatric: Calm . no agitation.  Skin: No skin rash/ulcers visualized to face, hands or feet.    Intake and output:    @ 07:01  -   @ 07:00  --------------------------------------------------------  IN: 540 mL / OUT: 0 mL / NET: 540 mL          INTERPRETATION OF TELEMETRY: Reviewed by me. NSR HR 60-70s no ectopy detected  ECG: Reviewed by me.   Ventricular Rate 82 BPM    Atrial Rate 82 BPM    P-R Interval 148 ms    QRS Duration 82 ms    Q-T Interval 378 ms    QTC Calculation(Bazett) 441 ms    P Axis 38 degrees    R Axis -14 degrees    T Axis 0 degrees    Diagnosis Line Normal sinus rhythm  Septal infarct , age undetermined  Abnormal ECG  When compared with ECG of 2022 01:09,  Septal infarct is now Present  T wave inversion now evident in Inferior leads  Confirmed by MD KRISHNAN PAUL ([id number]) on 2024 6:43:37 PM    RADIOLOGY & ADDITIONAL STUDIES:    X-ray:  reviewed by me.   CT scan:   MRI:   PROCEDURE DATE:  2024          INTERPRETATION:  CLINICAL STATEMENT: Syncope    TECHNIQUE: MRI of the brain without gadolinium.    COMPARISON: CT head 2024    FINDINGS:  There is moderate diffuse parenchymal volume loss.    There are nonspecific T2 prolongation signal abnormalities in the   periventricular subcortical white matter likely related to mild chronic   microvascular ischemic changes.    Chronic infarct left frontal lobe.    There is no acute parenchymal hemorrhage, parenchymal mass, mass effect   or midline shift. There is no extra-axial fluid collection.  There is no   hydrocephalus.  There is no acute infarct. Partial empty sella.    Mucosal thickening and retention cyst/polyps noted in the paranasal   sinuses. Small air-fluid levels noted in the maxillary sinuses.      IMPRESSION:  No acute intracranial hemorrhage or acute infarct.    Small air-fluid levels maxillary sinuses which may represent acute on   chronic sinusitis in the correct clinical setting. Correlate clinically.    --- End of Report ---                                                                                                                                             ELECTROPHYSIOLOGY CONSULTATION NOTE                                                                                             Consult requested by:  Dr Blake Cardiology  Reason for Consultation: Syncope/ ILR insertion   History obtained by: Patient and medical record   obtained: No    Chief complaint:    Patient is a 59y old  Female who presents with a chief complaint of syncope (07 Dec 2024 11:27)      HPI:  59y Female presents with L shoulder pain and limited ROM due to pain after syncopal fall earlier today. States she passed out and does not remember what happened. States she was confused for at least 10 min after waking. Denies any numbness/tingling in the affected extremity. Does not know if she hit her head. Denies any other orthopedic injuries at this time. Patient is R hand dominant. Patient ambulates without assistance at baseline. Denies fever, chills, HA, dizziness, chest pain, SOB, N/V/D, urinary frequency, urgency, or incontinence. Patient states that the last thing she remembers is getting up from a sitting position to get some coffee. Patient states that she has a history of seizures in the past, but hasn't had a seizure in years.     24- EP asked to evaluate pt for ILR insertion secondary to the aforementioned traumatic syncopal event. Pt states she has had no prior events and no cardiac hx. Tele with NSR no ectopy /tachy/ or shefali occurrences.    PMH: TBI, Epilepsy, HTN, Anxiety, and HLD  PSH: Ovarian cyst removal  Meds: Reviewed  Allergies: NKDA  FH: CAD  SH: No tobacco, rarely drinks alcohol, and no recreational drug use  (05 Dec 2024 20:47)        REVIEW OF SYMPTOMS:     CONSTITUTIONAL: No fever, weight loss, or fatigue  ENMT:  No difficulty hearing, tinnitus, vertigo; No sinus or throat pain  NECK: No pain or stiffness  CARDIOVASCULAR: No chest pain, dyspnea, syncope, palpitations, dizziness, Orthopnea, Paroxsymal nocturnal dyspnea  RESPIRATORY: No Dyspnea on exertion, Shortness of breath, cough, wheezing  : No dysuria, no hematuria   GI: No dark color stool, no melena, no diarrhea, no constipation, no abdominal pain   NEURO: No headache, no dizziness, no slurred speech   MUSCULOSKELETAL: No joint pain or swelling; No muscle, back, or extremity pain  PSYCH: No agitation, no anxiety.    ALL OTHER REVIEW OF SYSTEMS ARE NEGATIVE.      PREVIOUS DIAGNOSTIC TESTING  ECHO FINDINGS:  TRANSTHORACIC ECHOCARDIOGRAM REPORT  ________________________________________________________________________________                                      _______       Pt. Name:       SUDHEER SANDOVAL Study Date:    2024  MRN:            [id number]           YOB: 1965  Accession #:    [id number]          Age:           59 years  Account#:       [id number]       Gender:        F  Heart Rate:                        Height:        62.00 in (157.48 cm)  Rhythm:           Weight:        151.00 lb (68.49 kg)  Blood Pressure: 135/56 mmHg        BSA/BMI:       1.70 m² / 27.62 kg/m²  ________________________________________________________________________________________  Referring Physician:    [id number] Kalpana Florence  Interpreting Physician: Venugopal Palla MD  Primary Sonographer:    Anum Desai RDCS    CPT:               ECHO TTE WO CON COMP W DOPP - 35329.m  Indication(s):     Syncope and collapse - R55  Procedure:         Transthoracic echocardiogram with 2-D, M-mode and complete                     spectral and color flow Doppler.  Ordering Location:   Admission Status:  Inpatient  Study Information: Image quality for this study is technically difficult.    _______________________________________________________________________________________     CONCLUSIONS:      1. Left ventricular cavity is normal in size. Left ventricular wall thickness is normal. Left ventricular systolic function is normal with an ejection fraction visually estimated at 60 to 65 %.   2. Normal left ventricular diastolic function.   3. Normal right ventricular cavity size and normal right ventricular systolic function.   4. Normal left and right atrial size.   5. Mild mitral regurgitation.   6. Mild tricuspid regurgitation.   7. Estimated pulmonary artery systolic pressure is 20 mmHg, consistent with normal pulmonary artery pressure.   8. Technically difficult image quality.    ________________________________________________________________________________________        ALLERGIES: Allergies    No Known Allergies    Intolerances          PAST MEDICAL HISTORY  ETOH abuse    Subdural hematoma    Hypertension    Anxiety    No pertinent past medical history    Alcohol abuse    PTSD (post-traumatic stress disorder)        PAST SURGICAL HISTORY  No significant past surgical history    No significant past surgical history    S/P     S/P tonsillectomy    H/O ovarian cystectomy        FAMILY HISTORY:  Family history of anxiety disorder (Sibling)    Family history of diabetes mellitus (DM)        SOCIAL HISTORY:  Denies smoking/alcohol/drugs  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  aspirin enteric coated  folic acid  multivitamin  rosuvastatin  thiamine          Vital Signs Last 24 Hrs  T(C): 37.4 (08 Dec 2024 11:32), Max: 37.4 (08 Dec 2024 11:32)  T(F): 99.4 (08 Dec 2024 11:32), Max: 99.4 (08 Dec 2024 11:32)  HR: 76 (08 Dec 2024 04:46) (76 - 99)  BP: 118/82 (08 Dec 2024 04:46) (107/72 - 118/93)  BP(mean): 84 (08 Dec 2024 04:15) (84 - 84)  RR: 18 (08 Dec 2024 11:32) (18 - 18)  SpO2: 99% (08 Dec 2024 11:32) (93% - 99%)    Parameters below as of 08 Dec 2024 11:32  Patient On (Oxygen Delivery Method): room air          PHYSICAL EXAM:  Constitutional: Comfortable . No acute distress.   HEENT: Atraumatic and normocephalic , neck is supple . no JVD. No carotid bruit. PEERL   CNS: A&Ox3. No focal deficits. EOMI. Cranial nerves II-IX are intact.   Lymph Nodes: Cervical : Not palpable.  Respiratory: CTAB  Cardiovascular: S1S2 RRR. No murmur/rubs or gallop.  Gastrointestinal: Soft non-tender and non distended . +Bowel sounds. negative Norton's sign.  Extremities: No edema. Left arm sling in place  Psychiatric: Calm . no agitation.  Skin: No skin rash/ulcers visualized to face, hands or feet.    Intake and output:    @ 07:01  -   @ 07:00  --------------------------------------------------------  IN: 540 mL / OUT: 0 mL / NET: 540 mL          INTERPRETATION OF TELEMETRY: Reviewed by me. NSR HR 60-70s no ectopy detected  ECG: Reviewed by me.   Ventricular Rate 82 BPM    Atrial Rate 82 BPM    P-R Interval 148 ms    QRS Duration 82 ms    Q-T Interval 378 ms    QTC Calculation(Bazett) 441 ms    P Axis 38 degrees    R Axis -14 degrees    T Axis 0 degrees    Diagnosis Line Normal sinus rhythm  Septal infarct , age undetermined  Abnormal ECG  When compared with ECG of 2022 01:09,  Septal infarct is now Present  T wave inversion now evident in Inferior leads  Confirmed by MD KRISHNAN PAUL ([id number]) on 2024 6:43:37 PM    RADIOLOGY & ADDITIONAL STUDIES:    X-ray:  reviewed by me.   CT scan:   MRI:   PROCEDURE DATE:  2024          INTERPRETATION:  CLINICAL STATEMENT: Syncope    TECHNIQUE: MRI of the brain without gadolinium.    COMPARISON: CT head 2024    FINDINGS:  There is moderate diffuse parenchymal volume loss.    There are nonspecific T2 prolongation signal abnormalities in the   periventricular subcortical white matter likely related to mild chronic   microvascular ischemic changes.    Chronic infarct left frontal lobe.    There is no acute parenchymal hemorrhage, parenchymal mass, mass effect   or midline shift. There is no extra-axial fluid collection.  There is no   hydrocephalus.  There is no acute infarct. Partial empty sella.    Mucosal thickening and retention cyst/polyps noted in the paranasal   sinuses. Small air-fluid levels noted in the maxillary sinuses.      IMPRESSION:  No acute intracranial hemorrhage or acute infarct.    Small air-fluid levels maxillary sinuses which may represent acute on   chronic sinusitis in the correct clinical setting. Correlate clinically.    --- End of Report ---

## 2024-12-08 NOTE — PROGRESS NOTE ADULT - SUBJECTIVE AND OBJECTIVE BOX
HOSPITALIST PROGRESS NOTE    SUBJECTIVE / INTERVAL HPI: Patient seen and examined at bedside. Reports L arm pain which is relieved by IV Dilaudid. Requesting a different sling but orthopedic team doesn't have alternatives. Orthostatics rechecked today and negative     ROS: All 10 systems reviewed and found to be negative with the exception of what has been described above.     VITAL SIGNS:  Vital Signs Last 24 Hrs  T(C): 37.4 (08 Dec 2024 11:32), Max: 37.4 (08 Dec 2024 11:32)  T(F): 99.4 (08 Dec 2024 11:32), Max: 99.4 (08 Dec 2024 11:32)  HR: 76 (08 Dec 2024 04:46) (76 - 99)  BP: 118/82 (08 Dec 2024 04:46) (107/72 - 118/93)  BP(mean): 84 (08 Dec 2024 04:15) (84 - 84)  RR: 18 (08 Dec 2024 11:32) (18 - 18)  SpO2: 99% (08 Dec 2024 11:32) (93% - 99%)    Parameters below as of 08 Dec 2024 11:32  Patient On (Oxygen Delivery Method): room air    PHYSICAL EXAM:  General: Anxious, non cooperative   HEENT: NC/AT; PERRL, anicteric sclera; MMM  Neck: Supple  Cardiovascular: +S1/S2, RRR, no murmurs, rubs, gallops  Respiratory: CTA B/L; no W/R/R  Gastrointestinal: soft, NT/ND; +BSx4  Extremities: WWP; L arm in sling   Vascular: 2+ radial, DP/PT pulses B/L  Neurological: AAOx3    MEDICATIONS:  MEDICATIONS  (STANDING):  acetaminophen     Tablet .. 1000 milliGRAM(s) Oral every 6 hours  aspirin enteric coated 81 milliGRAM(s) Oral daily  cloNIDine 0.2 milliGRAM(s) Oral daily  folic acid 1 milliGRAM(s) Oral daily  multivitamin 1 Tablet(s) Oral daily  rosuvastatin 20 milliGRAM(s) Oral at bedtime  sodium chloride 0.9%. 1000 milliLiter(s) (75 mL/Hr) IV Continuous <Continuous>  thiamine 100 milliGRAM(s) Oral daily    MEDICATIONS  (PRN):  aluminum hydroxide/magnesium hydroxide/simethicone Suspension 30 milliLiter(s) Oral every 4 hours PRN Dyspepsia  clonazePAM  Tablet 0.5 milliGRAM(s) Oral every 8 hours PRN anxiety  diphenhydrAMINE 25 milliGRAM(s) Oral every 4 hours PRN Rash and/or Itching  HYDROmorphone   Tablet 2 milliGRAM(s) Oral every 6 hours PRN Moderate Pain (4 - 6)  HYDROmorphone  Injectable 0.2 milliGRAM(s) IV Push every 6 hours PRN Severe Pain (7 - 10)  melatonin 3 milliGRAM(s) Oral at bedtime PRN Insomnia  ondansetron   Disintegrating Tablet 4 milliGRAM(s) Oral every 6 hours PRN Nausea and/or Vomiting  ondansetron Injectable 4 milliGRAM(s) IV Push every 8 hours PRN Nausea and/or Vomiting      ALLERGIES:  Allergies    No Known Allergies    Intolerances        LABS:              CAPILLARY BLOOD GLUCOSE            RADIOLOGY & ADDITIONAL TESTS: Reviewed.
HOSPITALIST PROGRESS NOTE    SUBJECTIVE / INTERVAL HPI: Patient seen and examined at bedside. More calm and cooperative today. Patient is now willing to stay for full syncope workup. L arm still in pain but controlled with pain killers.     ROS: All 10 systems reviewed and found to be negative with the exception of what has been described above.     VITAL SIGNS:  Vital Signs Last 24 Hrs  T(C): 37.3 (07 Dec 2024 08:02), Max: 37.3 (07 Dec 2024 08:02)  T(F): 99.1 (07 Dec 2024 08:02), Max: 99.1 (07 Dec 2024 08:02)  HR: 115 (07 Dec 2024 08:02) (83 - 115)  BP: 144/80 (07 Dec 2024 08:02) (101/67 - 157/61)  BP(mean): 95 (07 Dec 2024 08:02) (86 - 95)  RR: 18 (07 Dec 2024 08:02) (16 - 18)  SpO2: 100% (07 Dec 2024 08:02) (96% - 100%)    Parameters below as of 07 Dec 2024 08:02  Patient On (Oxygen Delivery Method): room air    PHYSICAL EXAM:  General: Anxious, non cooperative   HEENT: NC/AT; PERRL, anicteric sclera; MMM  Neck: Supple  Cardiovascular: +S1/S2, RRR, no murmurs, rubs, gallops  Respiratory: CTA B/L; no W/R/R  Gastrointestinal: soft, NT/ND; +BSx4  Extremities: WWP; L arm in sling   Vascular: 2+ radial, DP/PT pulses B/L  Neurological: AAOx3    MEDICATIONS:  MEDICATIONS  (STANDING):  acetaminophen     Tablet .. 1000 milliGRAM(s) Oral every 6 hours  cloNIDine 0.2 milliGRAM(s) Oral daily  folic acid 1 milliGRAM(s) Oral daily  multivitamin 1 Tablet(s) Oral daily  rosuvastatin 20 milliGRAM(s) Oral at bedtime  thiamine 100 milliGRAM(s) Oral daily    MEDICATIONS  (PRN):  aluminum hydroxide/magnesium hydroxide/simethicone Suspension 30 milliLiter(s) Oral every 4 hours PRN Dyspepsia  clonazePAM  Tablet 0.5 milliGRAM(s) Oral every 8 hours PRN anxiety  diphenhydrAMINE 25 milliGRAM(s) Oral every 4 hours PRN Rash and/or Itching  HYDROmorphone   Tablet 2 milliGRAM(s) Oral every 6 hours PRN Moderate Pain (4 - 6)  HYDROmorphone  Injectable 0.2 milliGRAM(s) IV Push every 6 hours PRN Severe Pain (7 - 10)  melatonin 3 milliGRAM(s) Oral at bedtime PRN Insomnia  ondansetron   Disintegrating Tablet 4 milliGRAM(s) Oral every 6 hours PRN Nausea and/or Vomiting  ondansetron Injectable 4 milliGRAM(s) IV Push every 8 hours PRN Nausea and/or Vomiting      ALLERGIES:  Allergies    No Known Allergies    Intolerances        LABS:                        12.4   6.27  )-----------( 232      ( 06 Dec 2024 08:23 )             36.6     12-06    136  |  105  |  14  ----------------------------<  127[H]  3.9   |  28  |  0.67    Ca    9.0      06 Dec 2024 08:23  Phos  3.3     12-06  Mg     2.5     12-06    TPro  7.4  /  Alb  3.8  /  TBili  0.5  /  DBili  x   /  AST  13[L]  /  ALT  18  /  AlkPhos  133[H]  12-06    PT/INR - ( 05 Dec 2024 16:38 )   PT: 11.0 sec;   INR: 0.96 ratio         PTT - ( 05 Dec 2024 16:38 )  PTT:30.8 sec  Urinalysis Basic - ( 06 Dec 2024 08:23 )    Color: x / Appearance: x / SG: x / pH: x  Gluc: 127 mg/dL / Ketone: x  / Bili: x / Urobili: x   Blood: x / Protein: x / Nitrite: x   Leuk Esterase: x / RBC: x / WBC x   Sq Epi: x / Non Sq Epi: x / Bacteria: x      CAPILLARY BLOOD GLUCOSE            RADIOLOGY & ADDITIONAL TESTS: Reviewed.
HOSPITALIST PROGRESS NOTE    SUBJECTIVE / INTERVAL HPI: Patient seen and examined at bedside. Nauseous this morning which improved by lunch time. Patient non cooperative throughout the day -- refusing MRI, EEG and orthostatics. Understands the risks of refusing the workup at this time.   Agrees to IVF due to poor PO intake from nausea this morning     ROS: All 10 systems reviewed and found to be negative with the exception of what has been described above.     VITAL SIGNS:  Vital Signs Last 24 Hrs  T(C): 36.7 (06 Dec 2024 09:10), Max: 37.1 (05 Dec 2024 21:45)  T(F): 98.1 (06 Dec 2024 09:10), Max: 98.7 (05 Dec 2024 21:45)  HR: 77 (06 Dec 2024 09:10) (77 - 95)  BP: 140/89 (06 Dec 2024 09:10) (118/80 - 140/89)  BP(mean): 90 (06 Dec 2024 03:16) (90 - 95)  RR: 18 (06 Dec 2024 09:10) (16 - 18)  SpO2: 100% (06 Dec 2024 09:10) (97% - 100%)    Parameters below as of 06 Dec 2024 09:10  Patient On (Oxygen Delivery Method): room air    PHYSICAL EXAM:  General: Anxious, non cooperative   HEENT: NC/AT; PERRL, anicteric sclera; MMM  Neck: Supple  Cardiovascular: +S1/S2, RRR, no murmurs, rubs, gallops  Respiratory: CTA B/L; no W/R/R  Gastrointestinal: soft, NT/ND; +BSx4  Extremities: WWP; L arm in sling   Vascular: 2+ radial, DP/PT pulses B/L  Neurological: AAOx3    MEDICATIONS:  MEDICATIONS  (STANDING):  acetaminophen     Tablet .. 1000 milliGRAM(s) Oral every 6 hours  cloNIDine 0.2 milliGRAM(s) Oral daily  folic acid 1 milliGRAM(s) Oral daily  multivitamin 1 Tablet(s) Oral daily  rosuvastatin 20 milliGRAM(s) Oral at bedtime  sodium chloride 0.9%. 1000 milliLiter(s) (75 mL/Hr) IV Continuous <Continuous>  thiamine 100 milliGRAM(s) Oral daily    MEDICATIONS  (PRN):  aluminum hydroxide/magnesium hydroxide/simethicone Suspension 30 milliLiter(s) Oral every 4 hours PRN Dyspepsia  clonazePAM  Tablet 0.5 milliGRAM(s) Oral every 8 hours PRN anxiety  diphenhydrAMINE 25 milliGRAM(s) Oral every 4 hours PRN Rash and/or Itching  HYDROmorphone  Injectable 0.2 milliGRAM(s) IV Push every 6 hours PRN Severe Pain (7 - 10)  melatonin 3 milliGRAM(s) Oral at bedtime PRN Insomnia  ondansetron   Disintegrating Tablet 4 milliGRAM(s) Oral every 6 hours PRN Nausea and/or Vomiting  ondansetron Injectable 4 milliGRAM(s) IV Push every 8 hours PRN Nausea and/or Vomiting  oxyCODONE    IR 2.5 milliGRAM(s) Oral every 4 hours PRN Moderate Pain (4 - 6)      ALLERGIES:  Allergies    No Known Allergies    Intolerances        LABS:                        12.4   6.27  )-----------( 232      ( 06 Dec 2024 08:23 )             36.6     12-06    136  |  105  |  14  ----------------------------<  127[H]  3.9   |  28  |  0.67    Ca    9.0      06 Dec 2024 08:23  Phos  3.3     12-06  Mg     2.5     12-06    TPro  7.4  /  Alb  3.8  /  TBili  0.5  /  DBili  x   /  AST  13[L]  /  ALT  18  /  AlkPhos  133[H]  12-06    PT/INR - ( 05 Dec 2024 16:38 )   PT: 11.0 sec;   INR: 0.96 ratio         PTT - ( 05 Dec 2024 16:38 )  PTT:30.8 sec  Urinalysis Basic - ( 06 Dec 2024 08:23 )    Color: x / Appearance: x / SG: x / pH: x  Gluc: 127 mg/dL / Ketone: x  / Bili: x / Urobili: x   Blood: x / Protein: x / Nitrite: x   Leuk Esterase: x / RBC: x / WBC x   Sq Epi: x / Non Sq Epi: x / Bacteria: x      CAPILLARY BLOOD GLUCOSE      POCT Blood Glucose.: 100 mg/dL (05 Dec 2024 14:14)        RADIOLOGY & ADDITIONAL TESTS: Reviewed.
  CHIEF COMPLAINT: syncope     HPI:  Ms. Padilla is a 58 yo female with a hx HTN, HLD, anxiety, TBI, epilepsy presents after an episode of syncope.     She reports being in her usual state of health- was drinking coffee and got up from her seat when she suddenly developed LOC. Denies prodromal dizziness, chest pain, SOB. Reports hx of seizures but none in many years.     She has nocardiac hx.   Cardiology consulted for possible cardiac cause of syncope.   24: Awake alert ambulating in room tele SR brief ST    Hs trop neg x1.   ECG and tele with no evidence of arrhythmia       PAST MEDICAL / SURGICAL HISTORY:  PAST MEDICAL & SURGICAL HISTORY:  ETOH abuse      Subdural hematoma      Hypertension      Anxiety      No pertinent past medical history      Alcohol abuse      PTSD (post-traumatic stress disorder)      No significant past surgical history      S/P       S/P tonsillectomy      H/O ovarian cystectomy          SOCIAL HISTORY:   Alcohol: Denied  Smoking: Nonsmoker  Drug Use: Denied  Marital Status:     FAMILY HISTORY: FAMILY HISTORY:  Family history of anxiety disorder (Sibling)    Family history of diabetes mellitus (DM)    MEDICATIONS  (STANDING):  acetaminophen     Tablet .. 1000 milliGRAM(s) Oral every 6 hours  cloNIDine 0.2 milliGRAM(s) Oral daily  folic acid 1 milliGRAM(s) Oral daily  multivitamin 1 Tablet(s) Oral daily  rosuvastatin 20 milliGRAM(s) Oral at bedtime  thiamine 100 milliGRAM(s) Oral daily    MEDICATIONS  (PRN):  aluminum hydroxide/magnesium hydroxide/simethicone Suspension 30 milliLiter(s) Oral every 4 hours PRN Dyspepsia  clonazePAM  Tablet 0.5 milliGRAM(s) Oral every 8 hours PRN anxiety  diphenhydrAMINE 25 milliGRAM(s) Oral every 4 hours PRN Rash and/or Itching  HYDROmorphone  Injectable 0.2 milliGRAM(s) IV Push every 6 hours PRN Severe Pain (7 - 10)  melatonin 3 milliGRAM(s) Oral at bedtime PRN Insomnia  ondansetron   Disintegrating Tablet 4 milliGRAM(s) Oral every 6 hours PRN Nausea and/or Vomiting  ondansetron Injectable 4 milliGRAM(s) IV Push every 8 hours PRN Nausea and/or Vomiting  oxyCODONE    IR 2.5 milliGRAM(s) Oral every 4 hours PRN Moderate Pain (4 - 6)      Vital Signs Last 24 Hrs  T(C): 37.3 (07 Dec 2024 08:02), Max: 37.3 (07 Dec 2024 08:02)  T(F): 99.1 (07 Dec 2024 08:02), Max: 99.1 (07 Dec 2024 08:02)  HR: 115 (07 Dec 2024 08:02) (83 - 115)  BP: 144/80 (07 Dec 2024 08:02) (101/67 - 157/61)  BP(mean): 95 (07 Dec 2024 08:02) (86 - 95)  RR: 18 (07 Dec 2024 08:02) (16 - 18)  SpO2: 100% (07 Dec 2024 08:02) (96% - 100%)    Parameters below as of 07 Dec 2024 08:02  Patient On (Oxygen Delivery Method): room air          Allergies    No Known Allergies    Intolerances        I&O's Summary      PHYSICAL EXAM:  Constitutional: NAD, awake and alert  HEENT:  EOMI,  Pupils round, No oral cyanosis.  Pulmonary: Non-labored, breath sounds are clear bilaterally  Cardiovascular: S1 and S2, RRR  Gastrointestinal: Abd soft, nontender.   Lymph: No peripheral edema.  Neurological: Alert, no focal deficits  Psych:  Mood & affect appropriate    LABS:                        12.4   6.27  )-----------( 232      ( 06 Dec 2024 08:23 )             36.6                         12.3   8.12  )-----------( 280      ( 05 Dec 2024 16:38 )             36.7     06 Dec 2024 08:23    136    |  105    |  14     ----------------------------<  127    3.9     |  28     |  0.67   05 Dec 2024 16:38    135    |  106    |  15     ----------------------------<  99     3.9     |  25     |  0.62     Ca    9.0        06 Dec 2024 08:23  Ca    9.2        05 Dec 2024 16:38  Phos  3.3       06 Dec 2024 08:23  Mg     2.5       06 Dec 2024 08:23    TPro  7.4    /  Alb  3.8    /  TBili  0.5    /  DBili  x      /  AST  13     /  ALT  18     /  AlkPhos  133    06 Dec 2024 08:23  TPro  7.7    /  Alb  3.9    /  TBili  0.4    /  DBili  x      /  AST  17     /  ALT  18     /  AlkPhos  125    05 Dec 2024 16:38    PT/INR - ( 05 Dec 2024 16:38 )   PT: 11.0 sec;   INR: 0.96 ratio         PTT - ( 05 Dec 2024 16:38 )  PTT:30.8 sec    1. Left ventricular cavity is normal in size. Left ventricular wall thickness is normal. Left ventricular systolic function is normal with an ejection fraction visually estimated at 60 to 65 %.   2. Normal left ventricular diastolic function.   3. Normal right ventricular cavity size and normal right ventricular systolic function.   4. Normal left and right atrial size.   5. Mild mitral regurgitation.   6. Mild tricuspid regurgitation.   7. Estimated pulmonary artery systolic pressure is 20 mmHg, consistent with normal pulmonary artery pressure.   8. Technically difficult image quality.            
  CHIEF COMPLAINT: syncope     HPI:  Ms. Padilla is a 60 yo female with a hx HTN, HLD, anxiety, TBI, epilepsy presents after an episode of syncope.     She reports being in her usual state of health- was drinking coffee and got up from her seat when she suddenly developed LOC. Denies prodromal dizziness, chest pain, SOB. Reports hx of seizures but none in many years.     She has nocardiac hx.   Cardiology consulted for possible cardiac cause of syncope.     24: Awake alert ambulating in room tele SR brief ST  24: Awake comfortable no CP Tele SR 70's no arrythmia     Hs trop neg x1.   ECG and tele with no evidence of arrhythmia       PAST MEDICAL / SURGICAL HISTORY:  PAST MEDICAL & SURGICAL HISTORY:  ETOH abuse      Subdural hematoma      Hypertension      Anxiety      No pertinent past medical history      Alcohol abuse      PTSD (post-traumatic stress disorder)      No significant past surgical history      S/P       S/P tonsillectomy      H/O ovarian cystectomy          SOCIAL HISTORY:   Alcohol: Denied  Smoking: Nonsmoker  Drug Use: Denied  Marital Status:     FAMILY HISTORY: FAMILY HISTORY:  Family history of anxiety disorder (Sibling)    Family history of diabetes mellitus (DM)    MEDICATIONS  (STANDING):  acetaminophen     Tablet .. 1000 milliGRAM(s) Oral every 6 hours  cloNIDine 0.2 milliGRAM(s) Oral daily  folic acid 1 milliGRAM(s) Oral daily  multivitamin 1 Tablet(s) Oral daily  rosuvastatin 20 milliGRAM(s) Oral at bedtime  thiamine 100 milliGRAM(s) Oral daily    MEDICATIONS  (PRN):  aluminum hydroxide/magnesium hydroxide/simethicone Suspension 30 milliLiter(s) Oral every 4 hours PRN Dyspepsia  clonazePAM  Tablet 0.5 milliGRAM(s) Oral every 8 hours PRN anxiety  diphenhydrAMINE 25 milliGRAM(s) Oral every 4 hours PRN Rash and/or Itching  HYDROmorphone   Tablet 2 milliGRAM(s) Oral every 6 hours PRN Moderate Pain (4 - 6)  HYDROmorphone  Injectable 0.2 milliGRAM(s) IV Push every 6 hours PRN Severe Pain (7 - 10)  melatonin 3 milliGRAM(s) Oral at bedtime PRN Insomnia  ondansetron   Disintegrating Tablet 4 milliGRAM(s) Oral every 6 hours PRN Nausea and/or Vomiting  ondansetron Injectable 4 milliGRAM(s) IV Push every 8 hours PRN Nausea and/or Vomiting        Vital Signs Last 24 Hrs  T(C): 36.5 (08 Dec 2024 04:46), Max: 36.8 (07 Dec 2024 17:24)  T(F): 97.7 (08 Dec 2024 04:46), Max: 98.3 (07 Dec 2024 17:24)  HR: 76 (08 Dec 2024 04:46) (76 - 99)  BP: 118/82 (08 Dec 2024 04:46) (107/72 - 118/93)  BP(mean): 84 (08 Dec 2024 04:15) (84 - 84)  RR: 18 (08 Dec 2024 04:46) (18 - 18)  SpO2: 94% (08 Dec 2024 04:46) (93% - 95%)    Parameters below as of 08 Dec 2024 04:46  Patient On (Oxygen Delivery Method): room air                  Allergies    No Known Allergies    Intolerances        I&O's Summary      PHYSICAL EXAM:  Constitutional: NAD, awake and alert  HEENT:  EOMI,  Pupils round, No oral cyanosis.  Pulmonary: Non-labored, breath sounds are clear bilaterally  Cardiovascular: S1 and S2, RRR  Gastrointestinal: Abd soft, nontender.   Lymph: No peripheral edema.  Neurological: Alert, no focal deficits  Psych:  Mood & affect appropriate    LABS:                        12.4   6.27  )-----------( 232      ( 06 Dec 2024 08:23 )             36.6                         12.3   8.12  )-----------( 280      ( 05 Dec 2024 16:38 )             36.7     06 Dec 2024 08:23    136    |  105    |  14     ----------------------------<  127    3.9     |  28     |  0.67   05 Dec 2024 16:38    135    |  106    |  15     ----------------------------<  99     3.9     |  25     |  0.62     Ca    9.0        06 Dec 2024 08:23  Ca    9.2        05 Dec 2024 16:38  Phos  3.3       06 Dec 2024 08:23  Mg     2.5       06 Dec 2024 08:23    TPro  7.4    /  Alb  3.8    /  TBili  0.5    /  DBili  x      /  AST  13     /  ALT  18     /  AlkPhos  133    06 Dec 2024 08:23  TPro  7.7    /  Alb  3.9    /  TBili  0.4    /  DBili  x      /  AST  17     /  ALT  18     /  AlkPhos  125    05 Dec 2024 16:38    PT/INR - ( 05 Dec 2024 16:38 )   PT: 11.0 sec;   INR: 0.96 ratio         PTT - ( 05 Dec 2024 16:38 )  PTT:30.8 sec    1. Left ventricular cavity is normal in size. Left ventricular wall thickness is normal. Left ventricular systolic function is normal with an ejection fraction visually estimated at 60 to 65 %.   2. Normal left ventricular diastolic function.   3. Normal right ventricular cavity size and normal right ventricular systolic function.   4. Normal left and right atrial size.   5. Mild mitral regurgitation.   6. Mild tricuspid regurgitation.   7. Estimated pulmonary artery systolic pressure is 20 mmHg, consistent with normal pulmonary artery pressure.   8. Technically difficult image quality.

## 2024-12-08 NOTE — PROGRESS NOTE ADULT - ASSESSMENT
59y Female presents with L shoulder pain and limited ROM due to pain after syncopal fall earlier today. States she passed out and does not remember what happened. States she was confused for at least 10 min after waking. Denies any numbness/tingling in the affected extremity. Does not know if she hit her head. Denies any other orthopedic injuries at this time. Patient is R hand dominant. Patient ambulates without assistance at baseline. Denies fever, chills, HA, dizziness, chest pain, SOB, N/V/D, urinary frequency, urgency, or incontinence. Patient states that the last thing she remembers is getting up from a sitting position to get some coffee. Patient states that she has a history of seizures in the past, but hasn't had a seizure in years.     #Syncope  #Dehydration  - IV fluids overnight   - Orthostatics pending -- pt refusing care   - MRI, EEG ordered -- pt refusing  - TTE done -- prelim read is unremarkable   - Telemetry  - Neurology consulted  - Cardiology consulted     #LUE Fracture  - Pain regimen  - Ortho consulted -- no plan for surgery. L arm in sling. Follow up outpatient     #HTN  - Continue clonidine    #HLD    Continue IVF. MRI, EEG ordered for syncope workup. 
59y Female presents with L shoulder pain and limited ROM due to pain after syncopal fall earlier today. States she passed out and does not remember what happened. States she was confused for at least 10 min after waking. Denies any numbness/tingling in the affected extremity. Does not know if she hit her head. Denies any other orthopedic injuries at this time. Patient is R hand dominant. Patient ambulates without assistance at baseline. Denies fever, chills, HA, dizziness, chest pain, SOB, N/V/D, urinary frequency, urgency, or incontinence. Patient states that the last thing she remembers is getting up from a sitting position to get some coffee. Patient states that she has a history of seizures in the past, but hasn't had a seizure in years.     #Syncope  #Dehydration  - Multiple episodes of sudden LOC over the years   - IV fluids given overnight on hospital day 1  - Orthostatics negative this morning. Patient requesting additional fluids   - MRI reviewed -- ASA added   - EEG placed -- follow up results  - TTE done -- no acute findings  - EP consulted for ILR placement   - Telemetry -- no acute events  - Neurology consulted appreciated   - Cardiology consulted appreciated     #LUE Fracture  - Dilaudid works better than Oxy for patient. Continue regimen  - Ortho consulted -- no plan for surgery. L arm in sling. Follow up outpatient   - I reached out to Ortho today to ask for an alternative sling as this one is irritating patient's skin. Ortho Team recommends pt to purchase one as we don't carry additional types in house     #HTN  - Continue clonidine    #Elevated TSH  - Mildly elevated with normal t4/t3. Monitor outpatient     #History of alcohol abuse  - Has been sober for many years. No active issues    DISPO: Pending EEG. EP also consulted for ILR on Monday  
59y Female presents with L shoulder pain and limited ROM due to pain after syncopal fall earlier today. States she passed out and does not remember what happened. States she was confused for at least 10 min after waking. Denies any numbness/tingling in the affected extremity. Does not know if she hit her head. Denies any other orthopedic injuries at this time. Patient is R hand dominant. Patient ambulates without assistance at baseline. Denies fever, chills, HA, dizziness, chest pain, SOB, N/V/D, urinary frequency, urgency, or incontinence. Patient states that the last thing she remembers is getting up from a sitting position to get some coffee. Patient states that she has a history of seizures in the past, but hasn't had a seizure in years.     #Syncope  #Dehydration  - Multiple episodes of sudden LOC over the years   - IV fluids overnight   - Orthostatics negative  - MRI, EEG ordered -- initially refusing but now agreeable. Follow up  - TTE done -- no acute findings  - EP consulted for ILR placement   - Telemetry  - Neurology consulted appreciated   - Cardiology consulted appreciated     #LUE Fracture  - Dilaudid works better than Oxy for patient. Continue regimen  - Ortho consulted -- no plan for surgery. L arm in sling. Follow up outpatient     #HTN  - Continue clonidine    #Elevated TSH  - Mildly elevated with normal t4/t3. Monitor outpatient     #History of alcohol abuse  - Has been sober for many years. No active issues    DISPO: Pending MRI, EEG. EP also consulted ILR on Monday

## 2024-12-16 DIAGNOSIS — G40.909 EPILEPSY, UNSPECIFIED, NOT INTRACTABLE, WITHOUT STATUS EPILEPTICUS: ICD-10-CM

## 2024-12-16 DIAGNOSIS — R55 SYNCOPE AND COLLAPSE: ICD-10-CM
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
## 2024-12-16 DIAGNOSIS — S42.202A UNSPECIFIED FRACTURE OF UPPER END OF LEFT HUMERUS, INITIAL ENCOUNTER FOR CLOSED FRACTURE: ICD-10-CM

## 2024-12-16 DIAGNOSIS — Z87.820 PERSONAL HISTORY OF TRAUMATIC BRAIN INJURY: ICD-10-CM

## 2024-12-16 DIAGNOSIS — Z53.29 PROCEDURE AND TREATMENT NOT CARRIED OUT BECAUSE OF PATIENT'S DECISION FOR OTHER REASONS: ICD-10-CM

## 2024-12-16 DIAGNOSIS — Z82.49 FAMILY HISTORY OF ISCHEMIC HEART DISEASE AND OTHER DISEASES OF THE CIRCULATORY SYSTEM: ICD-10-CM

## 2024-12-16 DIAGNOSIS — E86.0 DEHYDRATION: ICD-10-CM

## 2024-12-16 DIAGNOSIS — E78.5 HYPERLIPIDEMIA, UNSPECIFIED: ICD-10-CM

## 2024-12-16 DIAGNOSIS — F41.9 ANXIETY DISORDER, UNSPECIFIED: ICD-10-CM
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
## 2024-12-16 DIAGNOSIS — R11.2 NAUSEA WITH VOMITING, UNSPECIFIED: ICD-10-CM

## 2024-12-16 DIAGNOSIS — Z90.89 ACQUIRED ABSENCE OF OTHER ORGANS: ICD-10-CM

## 2024-12-16 DIAGNOSIS — I10 ESSENTIAL (PRIMARY) HYPERTENSION: ICD-10-CM

## 2025-01-07 ENCOUNTER — APPOINTMENT (OUTPATIENT)
Dept: ORTHOPEDIC SURGERY | Facility: CLINIC | Age: 60
End: 2025-01-07

## 2025-01-09 ENCOUNTER — APPOINTMENT (OUTPATIENT)
Facility: CLINIC | Age: 60
End: 2025-01-09

## 2025-01-14 ENCOUNTER — APPOINTMENT (OUTPATIENT)
Facility: CLINIC | Age: 60
End: 2025-01-14

## 2025-01-15 ENCOUNTER — APPOINTMENT (OUTPATIENT)
Dept: ORTHOPEDIC SURGERY | Facility: CLINIC | Age: 60
End: 2025-01-15

## 2025-01-20 NOTE — ED ADULT TRIAGE NOTE - BSA (M2)

## 2025-01-27 ENCOUNTER — APPOINTMENT (OUTPATIENT)
Dept: ORTHOPEDIC SURGERY | Facility: CLINIC | Age: 60
End: 2025-01-27

## 2025-01-27 VITALS
WEIGHT: 141 LBS | HEIGHT: 62 IN | SYSTOLIC BLOOD PRESSURE: 154 MMHG | BODY MASS INDEX: 25.95 KG/M2 | HEART RATE: 79 BPM | DIASTOLIC BLOOD PRESSURE: 89 MMHG

## 2025-01-27 DIAGNOSIS — S42.295A OTHER NONDISPLACED FRACTURE OF UPPER END OF LEFT HUMERUS, INITIAL ENCOUNTER FOR CLOSED FRACTURE: ICD-10-CM

## 2025-01-27 PROCEDURE — 99203 OFFICE O/P NEW LOW 30 MIN: CPT

## 2025-01-27 PROCEDURE — 73030 X-RAY EXAM OF SHOULDER: CPT | Mod: LT

## 2025-01-29 PROBLEM — S42.295A OTHER CLOSED NONDISPLACED FRACTURE OF PROXIMAL END OF LEFT HUMERUS, INITIAL ENCOUNTER: Status: ACTIVE | Noted: 2025-01-27

## 2025-01-30 DIAGNOSIS — Z86.79 PERSONAL HISTORY OF OTHER DISEASES OF THE CIRCULATORY SYSTEM: ICD-10-CM

## 2025-01-30 RX ORDER — CLONIDINE HYDROCHLORIDE 0.3 MG/1
TABLET ORAL
Refills: 0 | Status: ACTIVE | COMMUNITY

## 2025-03-20 ENCOUNTER — APPOINTMENT (OUTPATIENT)
Dept: ORTHOPEDIC SURGERY | Facility: CLINIC | Age: 60
End: 2025-03-20

## 2025-03-20 DIAGNOSIS — S42.295A OTHER NONDISPLACED FRACTURE OF UPPER END OF LEFT HUMERUS, INITIAL ENCOUNTER FOR CLOSED FRACTURE: ICD-10-CM

## 2025-04-03 NOTE — PATIENT PROFILE ADULT - NSPRONUTRITIONRISK_GEN_A_NUR
"      SUBJECTIVE:     Postpartum Day 1  Delivery    Destiney Salgado states she feels well and has no complaints. She denies emotional concerns. Her pain is well controlled with current medications. The patient is ambulating. She is tolerating a regular diet. Flatus has been passed. Urinary output is adequate.    OBJECTIVE:     Vital Signs (Most Recent):  /74 (BP Location: Right arm, Patient Position: Lying)   Pulse 85   Temp 98 °F (36.7 °C) (Oral)   Resp 16   Ht 5' 1" (1.549 m)   Wt 65.8 kg (145 lb)   SpO2 98%   Breastfeeding Yes   BMI 27.40 kg/m²       Vital Signs Range (Last 24H):  Reviewed    I & O (Last 24H):  Intake/Output Summary (Last 24 hours)    Intake/Output Summary (Last 24 hours) at 4/3/2025 1322  Last data filed at 4/3/2025 0155  Gross per 24 hour   Intake --   Output 400 ml   Net -400 ml         Physical Exam:  Gen appears in NAD  Abd soft,appropriate tenderness normal bowel sounds  Incision bandage in place dry   Ext  neg homans, slight edema    Hemoglobin/Hematocrit  CBC:   Lab Results   Component Value Date/Time    WBC 11.11 2025 07:56 PM    RBC 3.06 (L) 2025 07:56 PM    HGB 8.9 (L) 2025 07:56 PM    HCT 27.4 (L) 2025 07:56 PM     2025 07:56 PM    MCV 90 2025 07:56 PM    MCH 29.1 2025 07:56 PM    MCHC 32.5 2025 07:56 PM       ABO/Rh  AB POS    Rubella      ASSESSMENT/PLAN:     Status post  section. Problem List[1]     Doing well postoperatively.     Routine advances, Continue current care.       [1]   Patient Active Problem List  Diagnosis   (none) - all problems resolved or deleted     " No indicators present

## 2025-07-15 NOTE — PATIENT PROFILE ADULT - NSPRONUTRITIONRISK_GEN_A_NUR
No care due was identified.  [facility] Embedded Care Due Messages. Reference number: [id number].   7/15/2025 3:40:16 PM CDT   No indicators present